# Patient Record
Sex: MALE | NOT HISPANIC OR LATINO | Employment: OTHER | URBAN - METROPOLITAN AREA
[De-identification: names, ages, dates, MRNs, and addresses within clinical notes are randomized per-mention and may not be internally consistent; named-entity substitution may affect disease eponyms.]

---

## 2017-01-10 ENCOUNTER — COMMUNICATION - HEALTHEAST (OUTPATIENT)
Dept: INTERNAL MEDICINE | Facility: CLINIC | Age: 67
End: 2017-01-10

## 2017-01-10 ENCOUNTER — HOSPITAL ENCOUNTER (OUTPATIENT)
Dept: CARDIOLOGY | Facility: HOSPITAL | Age: 67
Discharge: HOME | End: 2017-01-10
Attending: INTERNAL MEDICINE

## 2017-01-10 DIAGNOSIS — I34.1 MITRAL VALVE PROLAPSE: ICD-10-CM

## 2017-01-10 DIAGNOSIS — K21.00 REFLUX ESOPHAGITIS: ICD-10-CM

## 2017-01-10 LAB
AORTIC ROOT: 2.9 CM
AORTIC VALVE MEAN VELOCITY: 89.5 CM/S
ASCENDING AORTA: 2.9 CM
AV DIMENSIONLESS INDEX VTI: 0.7
AV MEAN GRADIENT: 4 MMHG
AV PEAK GRADIENT: 6.7 MMHG
AV VALVE AREA: 2.1 CM2
AV VELOCITY RATIO: 0.7
BSA FOR ECHO PROCEDURE: 2.49 M2
CV BLOOD PRESSURE: NORMAL MMHG
CV ECHO HEIGHT: 73 IN
CV ECHO WEIGHT: 265 LBS
DOP CALC AO PEAK VEL: 129 CM/S
DOP CALC AO VTI: 27 CM
DOP CALC LVOT AREA: 2.83 CM2
DOP CALC LVOT DIAMETER: 1.9 CM
DOP CALC LVOT PEAK VEL: 94 CM/S
DOP CALC LVOT STROKE VOLUME: 55.8 CM3
DOP CALC MV VTI: 25.5 CM
DOP CALCLVOT PEAK VEL VTI: 19.7 CM
ECHO EJECTION FRACTION ESTIMATED: 75 %
EJECTION FRACTION: 83 % (ref 55–75)
FRACTIONAL SHORTENING: 52.3 % (ref 28–44)
INTERVENTRICULAR SEPTUM IN END DIASTOLE: 0.8 CM (ref 0.6–1)
IVS/PW RATIO: 1
LA AREA 1: 17.7 CM2
LA AREA 2: 17.7 CM2
LEFT ATRIUM LENGTH: 4.82 CM
LEFT ATRIUM SIZE: 3.2 CM
LEFT ATRIUM VOLUME INDEX: 22.2 ML/M2
LEFT ATRIUM VOLUME: 55.2 CM3
LEFT VENTRICLE CARDIAC INDEX: 1.7 L/MIN/M2
LEFT VENTRICLE CARDIAC OUTPUT: 4.3 L/MIN
LEFT VENTRICLE DIASTOLIC VOLUME INDEX: 23.7 CM3/M2 (ref 34–74)
LEFT VENTRICLE DIASTOLIC VOLUME: 59 CM3 (ref 62–150)
LEFT VENTRICLE HEART RATE: 77 BPM
LEFT VENTRICLE MASS INDEX: 44 G/M2
LEFT VENTRICLE SYSTOLIC VOLUME INDEX: 4 CM3/M2 (ref 11–31)
LEFT VENTRICLE SYSTOLIC VOLUME: 10 CM3 (ref 21–61)
LEFT VENTRICULAR INTERNAL DIMENSION IN DIASTOLE: 4.4 CM (ref 4.2–5.8)
LEFT VENTRICULAR INTERNAL DIMENSION IN SYSTOLE: 2.1 CM (ref 2.5–4)
LEFT VENTRICULAR MASS: 109.4 G
LEFT VENTRICULAR OUTFLOW TRACT MEAN GRADIENT: 2 MMHG
LEFT VENTRICULAR OUTFLOW TRACT MEAN VELOCITY: 61 CM/S
LEFT VENTRICULAR OUTFLOW TRACT PEAK GRADIENT: 4 MMHG
LEFT VENTRICULAR POSTERIOR WALL IN END DIASTOLE: 0.8 CM (ref 0.6–1)
LV STROKE VOLUME INDEX: 22.4 ML/M2
MITRAL VALVE DECELERATION SLOPE: 3650 MM/S2
MITRAL VALVE E/A RATIO: 0.9
MITRAL VALVE MEAN INFLOW VELOCITY: 63 CM/S
MITRAL VALVE PEAK VELOCITY: 103 CM/S
MITRAL VALVE PRESSURE HALF-TIME: 73 MS
MV AREA VTI: 2.19 CM2
MV AVERAGE E/E' RATIO: 13.4 CM/S
MV DECELERATION TIME: 292 MS
MV E'TISSUE VEL-LAT: 6.34 CM/S
MV E'TISSUE VEL-MED: 5.46 CM/S
MV LATERAL E/E' RATIO: 12.5
MV MEAN GRADIENT: 2 MMHG
MV MEDIAL E/E' RATIO: 14.5
MV PEAK A VELOCITY: 86.9 CM/S
MV PEAK E VELOCITY: 79 CM/S
MV PEAK GRADIENT: 4.2 MMHG
MV VALVE AREA BY CONTINUITY EQUATION: 2.2 CM2
MV VALVE AREA PRESSURE 1/2 METHOD: 3 CM2
NUC REST DIASTOLIC VOLUME INDEX: 4240 LBS
NUC REST SYSTOLIC VOLUME INDEX: 73 IN
PULM VEIN A" WAVE DURATION": 127 MS
PULM VEIN S/D RATIO: 1.41
PULMONARY VEIN PEAK D VELOCITY: 48.2 CM/S
PULMONARY VEIN PEAK S VELOCITY: 67.9 CM/S
RA DIMENSION: 3 CM
RIGHT VENTRICULAR INTERNAL DIMENSION IN DYSTOLE: 3 CM
TRICUSPID REGURGITATION PEAK PRESSURE GRADIENT: 4.7 MMHG
TRICUSPID VALVE ANULAR PLANE SYSTOLIC EXCURSION: 2.7 CM
TRICUSPID VALVE PEAK REGURGITANT VELOCITY: 108 CM/S

## 2017-01-10 ASSESSMENT — MIFFLIN-ST. JEOR: SCORE: 2010.91

## 2017-01-16 ENCOUNTER — RECORDS - HEALTHEAST (OUTPATIENT)
Dept: PULMONOLOGY | Facility: OTHER | Age: 67
End: 2017-01-16

## 2017-01-16 ENCOUNTER — OFFICE VISIT - HEALTHEAST (OUTPATIENT)
Dept: PULMONOLOGY | Facility: OTHER | Age: 67
End: 2017-01-16

## 2017-01-16 ENCOUNTER — RECORDS - HEALTHEAST (OUTPATIENT)
Dept: ADMINISTRATIVE | Facility: OTHER | Age: 67
End: 2017-01-16

## 2017-01-16 ENCOUNTER — OFFICE VISIT - HEALTHEAST (OUTPATIENT)
Dept: CARDIOLOGY | Facility: CLINIC | Age: 67
End: 2017-01-16

## 2017-01-16 DIAGNOSIS — I10 ESSENTIAL HYPERTENSION: ICD-10-CM

## 2017-01-16 DIAGNOSIS — K21.9 GERD (GASTROESOPHAGEAL REFLUX DISEASE): ICD-10-CM

## 2017-01-16 DIAGNOSIS — G47.33 OSA ON CPAP: ICD-10-CM

## 2017-01-16 DIAGNOSIS — I25.10 CORONARY ARTERY DISEASE INVOLVING NATIVE CORONARY ARTERY OF NATIVE HEART WITHOUT ANGINA PECTORIS: ICD-10-CM

## 2017-01-16 DIAGNOSIS — R06.02 SHORTNESS OF BREATH: ICD-10-CM

## 2017-01-16 DIAGNOSIS — R06.00 DYSPNEA: ICD-10-CM

## 2017-01-16 DIAGNOSIS — J45.909 ASTHMA: ICD-10-CM

## 2017-01-16 LAB
CREAT SERPL-MCNC: 0.87 MG/DL (ref 0.7–1.3)
GFR SERPL CREATININE-BSD FRML MDRD: >60 ML/MIN/1.73M2

## 2017-01-16 ASSESSMENT — MIFFLIN-ST. JEOR
SCORE: 2015.45
SCORE: 2010.91

## 2017-01-26 ENCOUNTER — HOSPITAL ENCOUNTER (OUTPATIENT)
Dept: CT IMAGING | Facility: HOSPITAL | Age: 67
Discharge: HOME | End: 2017-01-26
Attending: INTERNAL MEDICINE

## 2017-01-26 DIAGNOSIS — R06.00 DYSPNEA: ICD-10-CM

## 2017-01-30 ENCOUNTER — COMMUNICATION - HEALTHEAST (OUTPATIENT)
Dept: PULMONOLOGY | Facility: OTHER | Age: 67
End: 2017-01-30

## 2017-02-01 ENCOUNTER — COMMUNICATION - HEALTHEAST (OUTPATIENT)
Dept: PULMONOLOGY | Facility: OTHER | Age: 67
End: 2017-02-01

## 2017-02-01 ENCOUNTER — COMMUNICATION - HEALTHEAST (OUTPATIENT)
Dept: CARDIOLOGY | Facility: CLINIC | Age: 67
End: 2017-02-01

## 2017-02-03 ENCOUNTER — COMMUNICATION - HEALTHEAST (OUTPATIENT)
Dept: PULMONOLOGY | Facility: OTHER | Age: 67
End: 2017-02-03

## 2017-02-09 ENCOUNTER — COMMUNICATION - HEALTHEAST (OUTPATIENT)
Dept: MULTI SPECIALTY CLINIC | Facility: CLINIC | Age: 67
End: 2017-02-09

## 2017-02-20 ENCOUNTER — RECORDS - HEALTHEAST (OUTPATIENT)
Dept: ADMINISTRATIVE | Facility: OTHER | Age: 67
End: 2017-02-20

## 2017-02-21 ENCOUNTER — COMMUNICATION - HEALTHEAST (OUTPATIENT)
Dept: INTERNAL MEDICINE | Facility: CLINIC | Age: 67
End: 2017-02-21

## 2017-02-22 ENCOUNTER — COMMUNICATION - HEALTHEAST (OUTPATIENT)
Dept: INTERNAL MEDICINE | Facility: CLINIC | Age: 67
End: 2017-02-22

## 2017-02-22 DIAGNOSIS — M54.40 BILATERAL LOW BACK PAIN WITH SCIATICA, SCIATICA LATERALITY UNSPECIFIED, UNSPECIFIED CHRONICITY: ICD-10-CM

## 2017-03-03 ENCOUNTER — COMMUNICATION - HEALTHEAST (OUTPATIENT)
Dept: INTERNAL MEDICINE | Facility: CLINIC | Age: 67
End: 2017-03-03

## 2017-03-06 ENCOUNTER — COMMUNICATION - HEALTHEAST (OUTPATIENT)
Dept: INTERNAL MEDICINE | Facility: CLINIC | Age: 67
End: 2017-03-06

## 2017-03-06 DIAGNOSIS — E78.5 HYPERLIPIDEMIA: ICD-10-CM

## 2017-03-06 DIAGNOSIS — Z79.899 MEDICATION MANAGEMENT: ICD-10-CM

## 2017-03-09 ENCOUNTER — COMMUNICATION - HEALTHEAST (OUTPATIENT)
Dept: INTERNAL MEDICINE | Facility: CLINIC | Age: 67
End: 2017-03-09

## 2017-03-27 ENCOUNTER — COMMUNICATION - HEALTHEAST (OUTPATIENT)
Dept: CARDIOLOGY | Facility: CLINIC | Age: 67
End: 2017-03-27

## 2017-03-27 DIAGNOSIS — R00.2 PALPITATIONS: ICD-10-CM

## 2017-03-29 ENCOUNTER — HOSPITAL ENCOUNTER (OUTPATIENT)
Dept: CARDIOLOGY | Facility: HOSPITAL | Age: 67
Discharge: HOME | End: 2017-03-29
Attending: INTERNAL MEDICINE

## 2017-03-29 DIAGNOSIS — R00.2 PALPITATIONS: ICD-10-CM

## 2017-04-02 ENCOUNTER — COMMUNICATION - HEALTHEAST (OUTPATIENT)
Dept: INTERNAL MEDICINE | Facility: CLINIC | Age: 67
End: 2017-04-02

## 2017-04-03 ENCOUNTER — COMMUNICATION - HEALTHEAST (OUTPATIENT)
Dept: INTERNAL MEDICINE | Facility: CLINIC | Age: 67
End: 2017-04-03

## 2017-04-06 ENCOUNTER — OFFICE VISIT - HEALTHEAST (OUTPATIENT)
Dept: INTERNAL MEDICINE | Facility: CLINIC | Age: 67
End: 2017-04-06

## 2017-04-06 DIAGNOSIS — E78.2 MIXED HYPERLIPIDEMIA: ICD-10-CM

## 2017-04-06 DIAGNOSIS — G47.33 OBSTRUCTIVE SLEEP APNEA (ADULT) (PEDIATRIC): ICD-10-CM

## 2017-04-06 DIAGNOSIS — R00.0 TACHYCARDIA: ICD-10-CM

## 2017-04-06 DIAGNOSIS — G89.29 BACK PAIN, CHRONIC: ICD-10-CM

## 2017-04-06 DIAGNOSIS — G89.21 CHRONIC PAIN DUE TO TRAUMA: ICD-10-CM

## 2017-04-06 DIAGNOSIS — R53.83 FATIGUE: ICD-10-CM

## 2017-04-06 DIAGNOSIS — I77.9 CAROTID ARTERY DISEASE (H): ICD-10-CM

## 2017-04-06 DIAGNOSIS — I10 ESSENTIAL HYPERTENSION: ICD-10-CM

## 2017-04-06 DIAGNOSIS — E11.9 TYPE 2 DIABETES MELLITUS WITHOUT COMPLICATION (H): ICD-10-CM

## 2017-04-06 DIAGNOSIS — M54.9 BACK PAIN, CHRONIC: ICD-10-CM

## 2017-04-06 DIAGNOSIS — L30.9 DERMATITIS: ICD-10-CM

## 2017-04-06 ASSESSMENT — MIFFLIN-ST. JEOR: SCORE: 2001.84

## 2017-04-07 ENCOUNTER — HOSPITAL ENCOUNTER (OUTPATIENT)
Dept: ULTRASOUND IMAGING | Facility: HOSPITAL | Age: 67
Discharge: HOME | End: 2017-04-07
Attending: INTERNAL MEDICINE

## 2017-04-07 DIAGNOSIS — I65.23 BILATERAL CAROTID ARTERY STENOSIS: ICD-10-CM

## 2017-04-07 DIAGNOSIS — I77.9 CAROTID ARTERY DISEASE (H): ICD-10-CM

## 2017-04-07 LAB
ALBUMIN PERCENT: 65.3 % (ref 51–67)
ALBUMIN SERPL ELPH-MCNC: 4.7 G/DL (ref 3.2–4.7)
ALPHA 1 PERCENT: 2.5 % (ref 2–4)
ALPHA 2 PERCENT: 11.4 % (ref 5–13)
ALPHA1 GLOB SERPL ELPH-MCNC: 0.2 G/DL (ref 0.1–0.3)
ALPHA2 GLOB SERPL ELPH-MCNC: 0.8 G/DL (ref 0.4–0.9)
B-GLOBULIN SERPL ELPH-MCNC: 0.8 G/DL (ref 0.7–1.2)
BETA PERCENT: 11.1 % (ref 10–17)
GAMMA GLOB SERPL ELPH-MCNC: 0.7 G/DL (ref 0.6–1.4)
GAMMA GLOBULIN PERCENT: 9.7 % (ref 9–20)
PATH ICD:: NORMAL
PROT PATTERN SERPL ELPH-IMP: NORMAL
PROT SERPL-MCNC: 7.2 G/DL (ref 6–8)
REVIEWING PATHOLOGIST: NORMAL

## 2017-04-11 ENCOUNTER — COMMUNICATION - HEALTHEAST (OUTPATIENT)
Dept: CARDIOLOGY | Facility: CLINIC | Age: 67
End: 2017-04-11

## 2017-04-11 ENCOUNTER — COMMUNICATION - HEALTHEAST (OUTPATIENT)
Dept: INTERNAL MEDICINE | Facility: CLINIC | Age: 67
End: 2017-04-11

## 2017-04-13 LAB — MUSK AB SER-SCNC: 0 NMOL/L (ref 0–0.02)

## 2017-04-14 ENCOUNTER — COMMUNICATION - HEALTHEAST (OUTPATIENT)
Dept: CARDIOLOGY | Facility: CLINIC | Age: 67
End: 2017-04-14

## 2017-04-14 ENCOUNTER — RECORDS - HEALTHEAST (OUTPATIENT)
Dept: ADMINISTRATIVE | Facility: OTHER | Age: 67
End: 2017-04-14

## 2017-04-14 DIAGNOSIS — R07.9 CHEST PAIN ON EXERTION: ICD-10-CM

## 2017-04-14 DIAGNOSIS — R06.09 DOE (DYSPNEA ON EXERTION): ICD-10-CM

## 2017-04-14 LAB
ACHR BIND IGG+IGM SER IA-SCNC: 0 NMOL/L
ACHR MOD AB/ACHR TOTAL SFR SER: 6 %
IMMUNOLOGIST REVIEW: NORMAL
STRIA MUS AB TITR SER: NEGATIVE TITER

## 2017-04-20 ENCOUNTER — COMMUNICATION - HEALTHEAST (OUTPATIENT)
Dept: CARDIOLOGY | Facility: CLINIC | Age: 67
End: 2017-04-20

## 2017-04-25 ENCOUNTER — HOSPITAL ENCOUNTER (OUTPATIENT)
Dept: CT IMAGING | Facility: CLINIC | Age: 67
Discharge: HOME | End: 2017-04-25
Attending: INTERNAL MEDICINE

## 2017-04-25 DIAGNOSIS — R06.09 DOE (DYSPNEA ON EXERTION): ICD-10-CM

## 2017-04-25 DIAGNOSIS — R07.9 CHEST PAIN ON EXERTION: ICD-10-CM

## 2017-04-25 DIAGNOSIS — R06.09 OTHER FORMS OF DYSPNEA: ICD-10-CM

## 2017-04-25 LAB
BSA FOR ECHO PROCEDURE: 2.48 M2
CV CALCIUM SCORE AGATSTON LM: 0
CV CALCIUM SCORING AGATSON LAD: 2
CV CALCIUM SCORING AGATSTON CX: 0
CV CALCIUM SCORING AGATSTON RCA: 67
CV CALCIUM SCORING AGATSTON TOTAL: 69
LEFT VENTRICLE HEART RATE: 61 BPM

## 2017-04-25 ASSESSMENT — MIFFLIN-ST. JEOR: SCORE: 2001.84

## 2017-04-28 ENCOUNTER — COMMUNICATION - HEALTHEAST (OUTPATIENT)
Dept: INTERNAL MEDICINE | Facility: CLINIC | Age: 67
End: 2017-04-28

## 2017-05-08 ENCOUNTER — COMMUNICATION - HEALTHEAST (OUTPATIENT)
Dept: PULMONOLOGY | Facility: OTHER | Age: 67
End: 2017-05-08

## 2017-05-08 ENCOUNTER — AMBULATORY - HEALTHEAST (OUTPATIENT)
Dept: PULMONOLOGY | Facility: OTHER | Age: 67
End: 2017-05-08

## 2017-05-08 DIAGNOSIS — R06.00 DYSPNEA: ICD-10-CM

## 2017-05-10 ENCOUNTER — AMBULATORY - HEALTHEAST (OUTPATIENT)
Dept: PULMONOLOGY | Facility: OTHER | Age: 67
End: 2017-05-10

## 2017-05-10 ENCOUNTER — COMMUNICATION - HEALTHEAST (OUTPATIENT)
Dept: PULMONOLOGY | Facility: OTHER | Age: 67
End: 2017-05-10

## 2017-05-10 DIAGNOSIS — R06.00 DYSPNEA: ICD-10-CM

## 2017-06-01 ENCOUNTER — COMMUNICATION - HEALTHEAST (OUTPATIENT)
Dept: PULMONOLOGY | Facility: OTHER | Age: 67
End: 2017-06-01

## 2017-06-02 ENCOUNTER — COMMUNICATION - HEALTHEAST (OUTPATIENT)
Dept: PULMONOLOGY | Facility: OTHER | Age: 67
End: 2017-06-02

## 2017-06-02 ENCOUNTER — AMBULATORY - HEALTHEAST (OUTPATIENT)
Dept: PULMONOLOGY | Facility: OTHER | Age: 67
End: 2017-06-02

## 2017-06-02 DIAGNOSIS — R06.00 DYSPNEA: ICD-10-CM

## 2017-06-06 ENCOUNTER — COMMUNICATION - HEALTHEAST (OUTPATIENT)
Dept: INTERNAL MEDICINE | Facility: CLINIC | Age: 67
End: 2017-06-06

## 2017-06-06 DIAGNOSIS — E78.5 HYPERLIPIDEMIA: ICD-10-CM

## 2017-06-20 ENCOUNTER — OFFICE VISIT - HEALTHEAST (OUTPATIENT)
Dept: INTERNAL MEDICINE | Facility: CLINIC | Age: 67
End: 2017-06-20

## 2017-06-20 ENCOUNTER — AMBULATORY - HEALTHEAST (OUTPATIENT)
Dept: INTERNAL MEDICINE | Facility: CLINIC | Age: 67
End: 2017-06-20

## 2017-06-20 DIAGNOSIS — E11.9 TYPE 2 DIABETES MELLITUS WITHOUT COMPLICATION (H): ICD-10-CM

## 2017-06-20 DIAGNOSIS — R06.02 SOBOE (SHORTNESS OF BREATH ON EXERTION): ICD-10-CM

## 2017-06-20 DIAGNOSIS — E66.9 OBESITY, UNSPECIFIED: ICD-10-CM

## 2017-06-20 DIAGNOSIS — K21.00 REFLUX ESOPHAGITIS: ICD-10-CM

## 2017-06-20 DIAGNOSIS — M54.50 LUMBAGO: ICD-10-CM

## 2017-06-20 DIAGNOSIS — G47.33 OBSTRUCTIVE SLEEP APNEA (ADULT) (PEDIATRIC): ICD-10-CM

## 2017-06-20 DIAGNOSIS — R00.0 TACHYCARDIA: ICD-10-CM

## 2017-06-20 LAB — HBA1C MFR BLD: 7.1 % (ref 3.5–6)

## 2017-06-20 ASSESSMENT — MIFFLIN-ST. JEOR: SCORE: 2002.75

## 2017-06-21 ENCOUNTER — COMMUNICATION - HEALTHEAST (OUTPATIENT)
Dept: INTERNAL MEDICINE | Facility: CLINIC | Age: 67
End: 2017-06-21

## 2017-06-29 ENCOUNTER — OFFICE VISIT - HEALTHEAST (OUTPATIENT)
Dept: PULMONOLOGY | Facility: OTHER | Age: 67
End: 2017-06-29

## 2017-06-29 DIAGNOSIS — J30.9 ALLERGIC RHINITIS, UNSPECIFIED ALLERGIC RHINITIS TRIGGER, UNSPECIFIED RHINITIS SEASONALITY: ICD-10-CM

## 2017-06-29 DIAGNOSIS — G47.33 OSA ON CPAP: ICD-10-CM

## 2017-06-29 DIAGNOSIS — J45.909 ASTHMA: ICD-10-CM

## 2017-06-29 DIAGNOSIS — R53.81 PHYSICAL DECONDITIONING: ICD-10-CM

## 2017-06-29 DIAGNOSIS — K21.9 GASTROESOPHAGEAL REFLUX DISEASE WITHOUT ESOPHAGITIS: ICD-10-CM

## 2017-07-05 ENCOUNTER — OFFICE VISIT - HEALTHEAST (OUTPATIENT)
Dept: SLEEP MEDICINE | Facility: CLINIC | Age: 67
End: 2017-07-05

## 2017-07-05 ENCOUNTER — COMMUNICATION - HEALTHEAST (OUTPATIENT)
Dept: SLEEP MEDICINE | Facility: CLINIC | Age: 67
End: 2017-07-05

## 2017-07-05 DIAGNOSIS — G47.33 OSA ON CPAP: ICD-10-CM

## 2017-07-05 DIAGNOSIS — G47.8 OTHER SLEEP DISORDERS: ICD-10-CM

## 2017-07-05 ASSESSMENT — MIFFLIN-ST. JEOR: SCORE: 2006.38

## 2017-07-18 ENCOUNTER — OFFICE VISIT - HEALTHEAST (OUTPATIENT)
Dept: PHYSICAL THERAPY | Facility: REHABILITATION | Age: 67
End: 2017-07-18

## 2017-07-18 DIAGNOSIS — M62.81 MUSCLE WEAKNESS (GENERALIZED): ICD-10-CM

## 2017-07-18 DIAGNOSIS — R53.1 DECREASED STRENGTH, ENDURANCE, AND MOBILITY: ICD-10-CM

## 2017-07-18 DIAGNOSIS — R26.81 GAIT INSTABILITY: ICD-10-CM

## 2017-07-18 DIAGNOSIS — Z74.09 DECREASED STRENGTH, ENDURANCE, AND MOBILITY: ICD-10-CM

## 2017-07-18 DIAGNOSIS — R68.89 DECREASED STRENGTH, ENDURANCE, AND MOBILITY: ICD-10-CM

## 2017-07-19 ENCOUNTER — OFFICE VISIT - HEALTHEAST (OUTPATIENT)
Dept: CARDIOLOGY | Facility: CLINIC | Age: 67
End: 2017-07-19

## 2017-07-19 DIAGNOSIS — I10 ESSENTIAL HYPERTENSION: ICD-10-CM

## 2017-07-19 ASSESSMENT — MIFFLIN-ST. JEOR: SCORE: 2006.38

## 2017-08-23 ENCOUNTER — RECORDS - HEALTHEAST (OUTPATIENT)
Dept: ADMINISTRATIVE | Facility: OTHER | Age: 67
End: 2017-08-23

## 2017-08-31 ENCOUNTER — COMMUNICATION - HEALTHEAST (OUTPATIENT)
Dept: PHYSICAL THERAPY | Facility: REHABILITATION | Age: 67
End: 2017-08-31

## 2017-09-10 ENCOUNTER — RECORDS - HEALTHEAST (OUTPATIENT)
Dept: SLEEP MEDICINE | Age: 67
End: 2017-09-10

## 2017-09-10 ENCOUNTER — RECORDS - HEALTHEAST (OUTPATIENT)
Dept: ADMINISTRATIVE | Facility: OTHER | Age: 67
End: 2017-09-10

## 2017-09-10 DIAGNOSIS — G47.33 OBSTRUCTIVE SLEEP APNEA (ADULT) (PEDIATRIC): ICD-10-CM

## 2017-09-10 DIAGNOSIS — G47.8 OTHER SLEEP DISORDERS: ICD-10-CM

## 2017-09-19 ENCOUNTER — AMBULATORY - HEALTHEAST (OUTPATIENT)
Dept: SLEEP MEDICINE | Facility: CLINIC | Age: 67
End: 2017-09-19

## 2017-09-20 ENCOUNTER — COMMUNICATION - HEALTHEAST (OUTPATIENT)
Dept: SLEEP MEDICINE | Facility: CLINIC | Age: 67
End: 2017-09-20

## 2017-09-21 ENCOUNTER — COMMUNICATION - HEALTHEAST (OUTPATIENT)
Dept: PULMONOLOGY | Facility: OTHER | Age: 67
End: 2017-09-21

## 2017-09-22 ENCOUNTER — AMBULATORY - HEALTHEAST (OUTPATIENT)
Dept: PULMONOLOGY | Facility: OTHER | Age: 67
End: 2017-09-22

## 2017-09-22 ENCOUNTER — OFFICE VISIT - HEALTHEAST (OUTPATIENT)
Dept: PHYSICAL THERAPY | Facility: REHABILITATION | Age: 67
End: 2017-09-22

## 2017-09-22 DIAGNOSIS — Z74.09 DECREASED STRENGTH, ENDURANCE, AND MOBILITY: ICD-10-CM

## 2017-09-22 DIAGNOSIS — R53.1 DECREASED STRENGTH, ENDURANCE, AND MOBILITY: ICD-10-CM

## 2017-09-22 DIAGNOSIS — M62.81 MUSCLE WEAKNESS (GENERALIZED): ICD-10-CM

## 2017-09-22 DIAGNOSIS — R68.89 DECREASED STRENGTH, ENDURANCE, AND MOBILITY: ICD-10-CM

## 2017-09-22 DIAGNOSIS — M54.50 CHRONIC BILATERAL LOW BACK PAIN WITHOUT SCIATICA: ICD-10-CM

## 2017-09-22 DIAGNOSIS — G89.29 CHRONIC BILATERAL LOW BACK PAIN WITHOUT SCIATICA: ICD-10-CM

## 2017-09-22 DIAGNOSIS — R06.00 DYSPNEA: ICD-10-CM

## 2017-09-26 ENCOUNTER — OFFICE VISIT - HEALTHEAST (OUTPATIENT)
Dept: PHYSICAL THERAPY | Facility: REHABILITATION | Age: 67
End: 2017-09-26

## 2017-09-26 DIAGNOSIS — Z74.09 DECREASED STRENGTH, ENDURANCE, AND MOBILITY: ICD-10-CM

## 2017-09-26 DIAGNOSIS — R68.89 DECREASED STRENGTH, ENDURANCE, AND MOBILITY: ICD-10-CM

## 2017-09-26 DIAGNOSIS — R53.1 DECREASED STRENGTH, ENDURANCE, AND MOBILITY: ICD-10-CM

## 2017-09-26 DIAGNOSIS — M62.81 MUSCLE WEAKNESS (GENERALIZED): ICD-10-CM

## 2017-09-26 DIAGNOSIS — G89.29 CHRONIC BILATERAL LOW BACK PAIN WITHOUT SCIATICA: ICD-10-CM

## 2017-09-26 DIAGNOSIS — M54.50 CHRONIC BILATERAL LOW BACK PAIN WITHOUT SCIATICA: ICD-10-CM

## 2017-09-28 ENCOUNTER — OFFICE VISIT - HEALTHEAST (OUTPATIENT)
Dept: SLEEP MEDICINE | Facility: CLINIC | Age: 67
End: 2017-09-28

## 2017-09-28 ENCOUNTER — AMBULATORY - HEALTHEAST (OUTPATIENT)
Dept: SLEEP MEDICINE | Facility: CLINIC | Age: 67
End: 2017-09-28

## 2017-09-28 DIAGNOSIS — G47.33 OSA ON CPAP: ICD-10-CM

## 2017-09-28 ASSESSMENT — MIFFLIN-ST. JEOR: SCORE: 2006.38

## 2017-10-18 ENCOUNTER — OFFICE VISIT - HEALTHEAST (OUTPATIENT)
Dept: PHYSICAL THERAPY | Facility: REHABILITATION | Age: 67
End: 2017-10-18

## 2017-10-18 DIAGNOSIS — R53.1 DECREASED STRENGTH, ENDURANCE, AND MOBILITY: ICD-10-CM

## 2017-10-18 DIAGNOSIS — R26.81 GAIT INSTABILITY: ICD-10-CM

## 2017-10-18 DIAGNOSIS — M54.50 CHRONIC BILATERAL LOW BACK PAIN WITHOUT SCIATICA: ICD-10-CM

## 2017-10-18 DIAGNOSIS — R68.89 DECREASED STRENGTH, ENDURANCE, AND MOBILITY: ICD-10-CM

## 2017-10-18 DIAGNOSIS — Z74.09 DECREASED STRENGTH, ENDURANCE, AND MOBILITY: ICD-10-CM

## 2017-10-18 DIAGNOSIS — G89.29 CHRONIC BILATERAL LOW BACK PAIN WITHOUT SCIATICA: ICD-10-CM

## 2017-10-18 DIAGNOSIS — M62.81 MUSCLE WEAKNESS (GENERALIZED): ICD-10-CM

## 2017-10-23 ENCOUNTER — RECORDS - HEALTHEAST (OUTPATIENT)
Dept: ADMINISTRATIVE | Facility: OTHER | Age: 67
End: 2017-10-23

## 2017-10-24 ENCOUNTER — RECORDS - HEALTHEAST (OUTPATIENT)
Dept: ADMINISTRATIVE | Facility: OTHER | Age: 67
End: 2017-10-24

## 2017-11-07 ENCOUNTER — COMMUNICATION - HEALTHEAST (OUTPATIENT)
Dept: PHYSICAL THERAPY | Facility: REHABILITATION | Age: 67
End: 2017-11-07

## 2017-11-08 ENCOUNTER — OFFICE VISIT - HEALTHEAST (OUTPATIENT)
Dept: PHYSICAL THERAPY | Facility: REHABILITATION | Age: 67
End: 2017-11-08

## 2017-11-08 DIAGNOSIS — Z74.09 DECREASED STRENGTH, ENDURANCE, AND MOBILITY: ICD-10-CM

## 2017-11-08 DIAGNOSIS — R68.89 DECREASED STRENGTH, ENDURANCE, AND MOBILITY: ICD-10-CM

## 2017-11-08 DIAGNOSIS — R26.81 GAIT INSTABILITY: ICD-10-CM

## 2017-11-08 DIAGNOSIS — R53.1 DECREASED STRENGTH, ENDURANCE, AND MOBILITY: ICD-10-CM

## 2017-11-08 DIAGNOSIS — M54.50 CHRONIC BILATERAL LOW BACK PAIN WITHOUT SCIATICA: ICD-10-CM

## 2017-11-08 DIAGNOSIS — M62.81 MUSCLE WEAKNESS (GENERALIZED): ICD-10-CM

## 2017-11-08 DIAGNOSIS — G89.29 CHRONIC BILATERAL LOW BACK PAIN WITHOUT SCIATICA: ICD-10-CM

## 2017-11-15 ENCOUNTER — COMMUNICATION - HEALTHEAST (OUTPATIENT)
Dept: PHYSICAL THERAPY | Facility: REHABILITATION | Age: 67
End: 2017-11-15

## 2017-11-22 ENCOUNTER — COMMUNICATION - HEALTHEAST (OUTPATIENT)
Dept: PHYSICAL THERAPY | Facility: REHABILITATION | Age: 67
End: 2017-11-22

## 2017-12-08 ENCOUNTER — COMMUNICATION - HEALTHEAST (OUTPATIENT)
Dept: INTERNAL MEDICINE | Facility: CLINIC | Age: 67
End: 2017-12-08

## 2017-12-23 ENCOUNTER — OFFICE VISIT - HEALTHEAST (OUTPATIENT)
Dept: FAMILY MEDICINE | Facility: CLINIC | Age: 67
End: 2017-12-23

## 2017-12-23 DIAGNOSIS — J20.9 BRONCHITIS, ACUTE: ICD-10-CM

## 2017-12-23 DIAGNOSIS — K57.92 DIVERTICULITIS OF INTESTINE WITHOUT PERFORATION OR ABSCESS, UNSPECIFIED BLEEDING STATUS, UNSPECIFIED PART OF INTESTINAL TRACT: ICD-10-CM

## 2017-12-23 DIAGNOSIS — J02.9 SORE THROAT: ICD-10-CM

## 2017-12-23 DIAGNOSIS — R06.03 ACUTE RESPIRATORY DISTRESS: ICD-10-CM

## 2017-12-23 ASSESSMENT — MIFFLIN-ST. JEOR: SCORE: 1951.94

## 2017-12-26 ENCOUNTER — COMMUNICATION - HEALTHEAST (OUTPATIENT)
Dept: INTERNAL MEDICINE | Facility: CLINIC | Age: 67
End: 2017-12-26

## 2017-12-26 DIAGNOSIS — K21.00 REFLUX ESOPHAGITIS: ICD-10-CM

## 2017-12-28 ENCOUNTER — RECORDS - HEALTHEAST (OUTPATIENT)
Dept: GENERAL RADIOLOGY | Facility: CLINIC | Age: 67
End: 2017-12-28

## 2017-12-28 ENCOUNTER — OFFICE VISIT - HEALTHEAST (OUTPATIENT)
Dept: INTERNAL MEDICINE | Facility: CLINIC | Age: 67
End: 2017-12-28

## 2017-12-28 DIAGNOSIS — E11.9 TYPE 2 DIABETES MELLITUS WITHOUT COMPLICATION (H): ICD-10-CM

## 2017-12-28 DIAGNOSIS — G47.33 OBSTRUCTIVE APNEA: ICD-10-CM

## 2017-12-28 DIAGNOSIS — J45.20 MILD INTERMITTENT ASTHMA WITHOUT COMPLICATION: ICD-10-CM

## 2017-12-28 DIAGNOSIS — J45.20 MILD INTERMITTENT ASTHMA, UNCOMPLICATED: ICD-10-CM

## 2017-12-28 DIAGNOSIS — J20.9 ACUTE BRONCHITIS: ICD-10-CM

## 2017-12-28 RX ORDER — SIMETHICONE 80 MG
80 TABLET,CHEWABLE ORAL EVERY 6 HOURS PRN
Status: SHIPPED | COMMUNITY
Start: 2016-05-27

## 2017-12-28 ASSESSMENT — MIFFLIN-ST. JEOR: SCORE: 1958.75

## 2018-01-08 ENCOUNTER — COMMUNICATION - HEALTHEAST (OUTPATIENT)
Dept: INTERNAL MEDICINE | Facility: CLINIC | Age: 68
End: 2018-01-08

## 2018-02-14 ENCOUNTER — COMMUNICATION - HEALTHEAST (OUTPATIENT)
Dept: INTERNAL MEDICINE | Facility: CLINIC | Age: 68
End: 2018-02-14

## 2018-02-14 DIAGNOSIS — K21.00 REFLUX ESOPHAGITIS: ICD-10-CM

## 2018-02-23 ENCOUNTER — COMMUNICATION - HEALTHEAST (OUTPATIENT)
Dept: INTERNAL MEDICINE | Facility: CLINIC | Age: 68
End: 2018-02-23

## 2018-02-23 DIAGNOSIS — E78.5 HYPERLIPIDEMIA: ICD-10-CM

## 2018-03-22 ENCOUNTER — COMMUNICATION - HEALTHEAST (OUTPATIENT)
Dept: INTERNAL MEDICINE | Facility: CLINIC | Age: 68
End: 2018-03-22

## 2018-03-22 DIAGNOSIS — E11.9 TYPE 2 DIABETES MELLITUS WITHOUT COMPLICATION (H): ICD-10-CM

## 2018-03-23 ENCOUNTER — COMMUNICATION - HEALTHEAST (OUTPATIENT)
Dept: INTERNAL MEDICINE | Facility: CLINIC | Age: 68
End: 2018-03-23

## 2018-03-23 DIAGNOSIS — Z79.899 MEDICATION MANAGEMENT: ICD-10-CM

## 2018-03-28 ENCOUNTER — COMMUNICATION - HEALTHEAST (OUTPATIENT)
Dept: INTERNAL MEDICINE | Facility: CLINIC | Age: 68
End: 2018-03-28

## 2018-03-28 ENCOUNTER — RECORDS - HEALTHEAST (OUTPATIENT)
Dept: ADMINISTRATIVE | Facility: OTHER | Age: 68
End: 2018-03-28

## 2018-03-28 ENCOUNTER — OFFICE VISIT - HEALTHEAST (OUTPATIENT)
Dept: INTERNAL MEDICINE | Facility: CLINIC | Age: 68
End: 2018-03-28

## 2018-03-28 DIAGNOSIS — Z00.00 ROUTINE GENERAL MEDICAL EXAMINATION AT A HEALTH CARE FACILITY: ICD-10-CM

## 2018-03-28 DIAGNOSIS — N39.41 URGENCY INCONTINENCE: ICD-10-CM

## 2018-03-28 DIAGNOSIS — I73.9 PERIPHERAL VASCULAR DISEASE (H): ICD-10-CM

## 2018-03-28 DIAGNOSIS — K21.00 REFLUX ESOPHAGITIS: ICD-10-CM

## 2018-03-28 DIAGNOSIS — Z12.5 SCREENING FOR PROSTATE CANCER: ICD-10-CM

## 2018-03-28 DIAGNOSIS — E11.9 TYPE 2 DIABETES MELLITUS WITHOUT COMPLICATION (H): ICD-10-CM

## 2018-03-28 DIAGNOSIS — H91.90 HEARING LOSS: ICD-10-CM

## 2018-03-28 DIAGNOSIS — Z00.00 MEDICARE ANNUAL WELLNESS VISIT, SUBSEQUENT: ICD-10-CM

## 2018-03-28 DIAGNOSIS — M47.812 CERVICAL SPONDYLOSIS: ICD-10-CM

## 2018-03-28 DIAGNOSIS — M54.9 BACK PAIN: ICD-10-CM

## 2018-03-28 DIAGNOSIS — E78.2 MIXED HYPERLIPIDEMIA: ICD-10-CM

## 2018-03-28 DIAGNOSIS — G47.33 OBSTRUCTIVE APNEA: ICD-10-CM

## 2018-03-28 LAB
ALBUMIN SERPL-MCNC: 4 G/DL (ref 3.5–5)
ALBUMIN UR-MCNC: NEGATIVE MG/DL
ALP SERPL-CCNC: 48 U/L (ref 45–120)
ALT SERPL W P-5'-P-CCNC: 23 U/L (ref 0–45)
ANION GAP SERPL CALCULATED.3IONS-SCNC: 10 MMOL/L (ref 5–18)
APPEARANCE UR: CLEAR
AST SERPL W P-5'-P-CCNC: 16 U/L (ref 0–40)
BILIRUB SERPL-MCNC: 0.7 MG/DL (ref 0–1)
BILIRUB UR QL STRIP: NEGATIVE
BUN SERPL-MCNC: 17 MG/DL (ref 8–22)
CALCIUM SERPL-MCNC: 9.9 MG/DL (ref 8.5–10.5)
CHLORIDE BLD-SCNC: 104 MMOL/L (ref 98–107)
CHOLEST SERPL-MCNC: 132 MG/DL
CO2 SERPL-SCNC: 21 MMOL/L (ref 22–31)
COLOR UR AUTO: YELLOW
CREAT SERPL-MCNC: 0.84 MG/DL (ref 0.7–1.3)
FASTING STATUS PATIENT QL REPORTED: YES
GFR SERPL CREATININE-BSD FRML MDRD: >60 ML/MIN/1.73M2
GLUCOSE BLD-MCNC: 115 MG/DL (ref 70–125)
GLUCOSE UR STRIP-MCNC: NEGATIVE MG/DL
HBA1C MFR BLD: 7 % (ref 3.5–6)
HDLC SERPL-MCNC: 34 MG/DL
HGB UR QL STRIP: NEGATIVE
KETONES UR STRIP-MCNC: NEGATIVE MG/DL
LDLC SERPL CALC-MCNC: 72 MG/DL
LEUKOCYTE ESTERASE UR QL STRIP: NEGATIVE
NITRATE UR QL: NEGATIVE
PH UR STRIP: 5 [PH] (ref 5–8)
POTASSIUM BLD-SCNC: 4.5 MMOL/L (ref 3.5–5)
PROT SERPL-MCNC: 7.2 G/DL (ref 6–8)
PSA SERPL-MCNC: 3.3 NG/ML (ref 0–4.5)
SODIUM SERPL-SCNC: 135 MMOL/L (ref 136–145)
SP GR UR STRIP: 1.02 (ref 1–1.03)
TRIGL SERPL-MCNC: 131 MG/DL
UROBILINOGEN UR STRIP-ACNC: NORMAL

## 2018-03-28 ASSESSMENT — MIFFLIN-ST. JEOR: SCORE: 1902.5

## 2018-03-29 ENCOUNTER — COMMUNICATION - HEALTHEAST (OUTPATIENT)
Dept: INTERNAL MEDICINE | Facility: CLINIC | Age: 68
End: 2018-03-29

## 2018-03-31 ENCOUNTER — RECORDS - HEALTHEAST (OUTPATIENT)
Dept: ADMINISTRATIVE | Facility: OTHER | Age: 68
End: 2018-03-31

## 2018-04-05 ENCOUNTER — COMMUNICATION - HEALTHEAST (OUTPATIENT)
Dept: INTERNAL MEDICINE | Facility: CLINIC | Age: 68
End: 2018-04-05

## 2018-04-10 ENCOUNTER — RECORDS - HEALTHEAST (OUTPATIENT)
Dept: ADMINISTRATIVE | Facility: OTHER | Age: 68
End: 2018-04-10

## 2018-04-10 ENCOUNTER — AMBULATORY - HEALTHEAST (OUTPATIENT)
Dept: INTERNAL MEDICINE | Facility: CLINIC | Age: 68
End: 2018-04-10

## 2018-04-10 ENCOUNTER — COMMUNICATION - HEALTHEAST (OUTPATIENT)
Dept: INTERNAL MEDICINE | Facility: CLINIC | Age: 68
End: 2018-04-10

## 2018-04-16 ENCOUNTER — RECORDS - HEALTHEAST (OUTPATIENT)
Dept: ADMINISTRATIVE | Facility: OTHER | Age: 68
End: 2018-04-16

## 2018-05-30 ENCOUNTER — COMMUNICATION - HEALTHEAST (OUTPATIENT)
Dept: ADMINISTRATIVE | Facility: CLINIC | Age: 68
End: 2018-05-30

## 2018-06-25 ENCOUNTER — COMMUNICATION - HEALTHEAST (OUTPATIENT)
Dept: INTERNAL MEDICINE | Facility: CLINIC | Age: 68
End: 2018-06-25

## 2018-06-25 DIAGNOSIS — K21.00 REFLUX ESOPHAGITIS: ICD-10-CM

## 2018-06-25 DIAGNOSIS — E11.9 TYPE 2 DIABETES MELLITUS WITHOUT COMPLICATION (H): ICD-10-CM

## 2018-07-23 ENCOUNTER — OFFICE VISIT - HEALTHEAST (OUTPATIENT)
Dept: INTERNAL MEDICINE | Facility: CLINIC | Age: 68
End: 2018-07-23

## 2018-07-23 DIAGNOSIS — S60.458A: ICD-10-CM

## 2018-07-23 DIAGNOSIS — M79.644 THUMB PAIN, RIGHT: ICD-10-CM

## 2018-07-25 ENCOUNTER — RECORDS - HEALTHEAST (OUTPATIENT)
Dept: ADMINISTRATIVE | Facility: OTHER | Age: 68
End: 2018-07-25

## 2018-08-04 ENCOUNTER — RECORDS - HEALTHEAST (OUTPATIENT)
Dept: ADMINISTRATIVE | Facility: OTHER | Age: 68
End: 2018-08-04

## 2018-09-04 ENCOUNTER — COMMUNICATION - HEALTHEAST (OUTPATIENT)
Dept: INTERNAL MEDICINE | Facility: CLINIC | Age: 68
End: 2018-09-04

## 2018-09-05 ENCOUNTER — RECORDS - HEALTHEAST (OUTPATIENT)
Dept: ADMINISTRATIVE | Facility: OTHER | Age: 68
End: 2018-09-05

## 2018-09-13 ENCOUNTER — RECORDS - HEALTHEAST (OUTPATIENT)
Dept: ADMINISTRATIVE | Facility: OTHER | Age: 68
End: 2018-09-13

## 2018-09-17 ENCOUNTER — OFFICE VISIT - HEALTHEAST (OUTPATIENT)
Dept: CARDIOLOGY | Facility: CLINIC | Age: 68
End: 2018-09-17

## 2018-09-17 DIAGNOSIS — I25.10 CORONARY ARTERY DISEASE INVOLVING NATIVE CORONARY ARTERY OF NATIVE HEART WITHOUT ANGINA PECTORIS: ICD-10-CM

## 2018-09-17 ASSESSMENT — MIFFLIN-ST. JEOR: SCORE: 1899.21

## 2018-10-05 ENCOUNTER — RECORDS - HEALTHEAST (OUTPATIENT)
Dept: ADMINISTRATIVE | Facility: OTHER | Age: 68
End: 2018-10-05

## 2018-10-29 ENCOUNTER — RECORDS - HEALTHEAST (OUTPATIENT)
Dept: ADMINISTRATIVE | Facility: OTHER | Age: 68
End: 2018-10-29

## 2018-11-07 ENCOUNTER — RECORDS - HEALTHEAST (OUTPATIENT)
Dept: ADMINISTRATIVE | Facility: OTHER | Age: 68
End: 2018-11-07

## 2018-11-13 ENCOUNTER — COMMUNICATION - HEALTHEAST (OUTPATIENT)
Dept: SCHEDULING | Facility: CLINIC | Age: 68
End: 2018-11-13

## 2018-11-14 ENCOUNTER — OFFICE VISIT - HEALTHEAST (OUTPATIENT)
Dept: INTERNAL MEDICINE | Facility: CLINIC | Age: 68
End: 2018-11-14

## 2018-11-14 DIAGNOSIS — R68.89 EXERCISE INTOLERANCE: ICD-10-CM

## 2018-11-14 DIAGNOSIS — G47.33 OBSTRUCTIVE APNEA: ICD-10-CM

## 2018-11-14 DIAGNOSIS — Z23 FLU VACCINE NEED: ICD-10-CM

## 2018-11-14 DIAGNOSIS — E66.812 CLASS 2 OBESITY DUE TO EXCESS CALORIES WITH BODY MASS INDEX (BMI) OF 39.0 TO 39.9 IN ADULT, UNSPECIFIED WHETHER SERIOUS COMORBIDITY PRESENT: ICD-10-CM

## 2018-11-14 DIAGNOSIS — E11.9 TYPE 2 DIABETES MELLITUS WITHOUT COMPLICATION, WITHOUT LONG-TERM CURRENT USE OF INSULIN (H): ICD-10-CM

## 2018-11-14 DIAGNOSIS — E66.09 CLASS 2 OBESITY DUE TO EXCESS CALORIES WITH BODY MASS INDEX (BMI) OF 39.0 TO 39.9 IN ADULT, UNSPECIFIED WHETHER SERIOUS COMORBIDITY PRESENT: ICD-10-CM

## 2018-11-14 LAB
ALBUMIN SERPL-MCNC: 4.2 G/DL (ref 3.5–5)
ALP SERPL-CCNC: 51 U/L (ref 45–120)
ALT SERPL W P-5'-P-CCNC: 15 U/L (ref 0–45)
ANION GAP SERPL CALCULATED.3IONS-SCNC: 11 MMOL/L (ref 5–18)
AST SERPL W P-5'-P-CCNC: 14 U/L (ref 0–40)
BILIRUB SERPL-MCNC: 0.6 MG/DL (ref 0–1)
BUN SERPL-MCNC: 18 MG/DL (ref 8–22)
CALCIUM SERPL-MCNC: 10.5 MG/DL (ref 8.5–10.5)
CHLORIDE BLD-SCNC: 103 MMOL/L (ref 98–107)
CO2 SERPL-SCNC: 24 MMOL/L (ref 22–31)
CREAT SERPL-MCNC: 0.8 MG/DL (ref 0.7–1.3)
ERYTHROCYTE [DISTWIDTH] IN BLOOD BY AUTOMATED COUNT: 12.4 % (ref 11–14.5)
GFR SERPL CREATININE-BSD FRML MDRD: >60 ML/MIN/1.73M2
GLUCOSE BLD-MCNC: 110 MG/DL (ref 70–125)
HBA1C MFR BLD: 6.3 % (ref 3.5–6)
HCT VFR BLD AUTO: 40.9 % (ref 40–54)
HGB BLD-MCNC: 13.8 G/DL (ref 14–18)
MCH RBC QN AUTO: 29.5 PG (ref 27–34)
MCHC RBC AUTO-ENTMCNC: 33.7 G/DL (ref 32–36)
MCV RBC AUTO: 87 FL (ref 80–100)
PLATELET # BLD AUTO: 277 THOU/UL (ref 140–440)
PMV BLD AUTO: 7.8 FL (ref 7–10)
POTASSIUM BLD-SCNC: 4.9 MMOL/L (ref 3.5–5)
PROT SERPL-MCNC: 7.3 G/DL (ref 6–8)
RBC # BLD AUTO: 4.67 MILL/UL (ref 4.4–6.2)
SODIUM SERPL-SCNC: 138 MMOL/L (ref 136–145)
T4 FREE SERPL-MCNC: 1 NG/DL (ref 0.7–1.8)
TSH SERPL DL<=0.005 MIU/L-ACNC: 2.81 UIU/ML (ref 0.3–5)
WBC: 8.1 THOU/UL (ref 4–11)

## 2018-11-15 ENCOUNTER — COMMUNICATION - HEALTHEAST (OUTPATIENT)
Dept: INTERNAL MEDICINE | Facility: CLINIC | Age: 68
End: 2018-11-15

## 2018-11-15 LAB — 25(OH)D3 SERPL-MCNC: 17.4 NG/ML (ref 30–80)

## 2018-11-21 ENCOUNTER — COMMUNICATION - HEALTHEAST (OUTPATIENT)
Dept: INTERNAL MEDICINE | Facility: CLINIC | Age: 68
End: 2018-11-21

## 2018-11-23 ENCOUNTER — HOSPITAL ENCOUNTER (OUTPATIENT)
Dept: CARDIOLOGY | Facility: HOSPITAL | Age: 68
Discharge: HOME | End: 2018-11-23
Attending: INTERNAL MEDICINE

## 2018-11-23 DIAGNOSIS — R68.89 EXERCISE INTOLERANCE: ICD-10-CM

## 2018-11-23 LAB
AORTIC ROOT: 3 CM
BSA FOR ECHO PROCEDURE: 2.38 M2
CV BLOOD PRESSURE: NORMAL MMHG
CV ECHO HEIGHT: 72.3 IN
CV ECHO WEIGHT: 246 LBS
DOP CALC LVOT AREA: 3.14 CM2
DOP CALC LVOT DIAMETER: 2 CM
DOP CALC LVOT PEAK VEL: 97.5 CM/S
DOP CALC LVOT STROKE VOLUME: 59.7 CM3
DOP CALCLVOT PEAK VEL VTI: 19 CM
EJECTION FRACTION: 65 % (ref 55–75)
FRACTIONAL SHORTENING: 35.3 % (ref 28–44)
INTERVENTRICULAR SEPTUM IN END DIASTOLE: 0.99 CM (ref 0.6–1)
IVS/PW RATIO: 1.2
LA AREA 1: 19 CM2
LA AREA 2: 15.2 CM2
LEFT ATRIUM LENGTH: 6.2 CM
LEFT ATRIUM SIZE: 3.4 CM
LEFT ATRIUM TO AORTIC ROOT RATIO: 1.13 NO UNITS
LEFT ATRIUM VOLUME INDEX: 16.6 ML/M2
LEFT ATRIUM VOLUME: 39.6 ML
LEFT VENTRICLE CARDIAC INDEX: 2.1 L/MIN/M2
LEFT VENTRICLE CARDIAC OUTPUT: 5 L/MIN
LEFT VENTRICLE DIASTOLIC VOLUME INDEX: 40.6 CM3/M2 (ref 34–74)
LEFT VENTRICLE DIASTOLIC VOLUME: 96.7 CM3 (ref 62–150)
LEFT VENTRICLE HEART RATE: 83 BPM
LEFT VENTRICLE MASS INDEX: 57.6 G/M2
LEFT VENTRICLE SYSTOLIC VOLUME INDEX: 14.4 CM3/M2 (ref 11–31)
LEFT VENTRICLE SYSTOLIC VOLUME: 34.2 CM3 (ref 21–61)
LEFT VENTRICULAR INTERNAL DIMENSION IN DIASTOLE: 4.59 CM (ref 4.2–5.8)
LEFT VENTRICULAR INTERNAL DIMENSION IN SYSTOLE: 2.97 CM (ref 2.5–4)
LEFT VENTRICULAR MASS: 137.1 G
LEFT VENTRICULAR OUTFLOW TRACT MEAN GRADIENT: 2 MMHG
LEFT VENTRICULAR OUTFLOW TRACT MEAN VELOCITY: 63.6 CM/S
LEFT VENTRICULAR OUTFLOW TRACT PEAK GRADIENT: 4 MMHG
LEFT VENTRICULAR POSTERIOR WALL IN END DIASTOLE: 0.81 CM (ref 0.6–1)
LV STROKE VOLUME INDEX: 25.1 ML/M2
MITRAL VALVE E/A RATIO: 0.7
MV AVERAGE E/E' RATIO: 8.9 CM/S
MV DECELERATION TIME: 271 MS
MV E'TISSUE VEL-LAT: 8.22 CM/S
MV E'TISSUE VEL-MED: 6.19 CM/S
MV LATERAL E/E' RATIO: 7.8
MV MEDIAL E/E' RATIO: 10.3
MV PEAK A VELOCITY: 94.1 CM/S
MV PEAK E VELOCITY: 64 CM/S
NUC REST DIASTOLIC VOLUME INDEX: 3936 LBS
NUC REST SYSTOLIC VOLUME INDEX: 72.25 IN
TRICUSPID REGURGITATION PEAK PRESSURE GRADIENT: 26.4 MMHG
TRICUSPID VALVE ANULAR PLANE SYSTOLIC EXCURSION: 2.6 CM
TRICUSPID VALVE PEAK REGURGITANT VELOCITY: 257 CM/S

## 2018-11-23 ASSESSMENT — MIFFLIN-ST. JEOR: SCORE: 1912.82

## 2018-11-26 ENCOUNTER — COMMUNICATION - HEALTHEAST (OUTPATIENT)
Dept: INTERNAL MEDICINE | Facility: CLINIC | Age: 68
End: 2018-11-26

## 2018-12-04 ENCOUNTER — COMMUNICATION - HEALTHEAST (OUTPATIENT)
Dept: INTERNAL MEDICINE | Facility: CLINIC | Age: 68
End: 2018-12-04

## 2018-12-04 DIAGNOSIS — R06.09 DYSPNEA ON EXERTION: ICD-10-CM

## 2018-12-05 ENCOUNTER — COMMUNICATION - HEALTHEAST (OUTPATIENT)
Dept: INTERNAL MEDICINE | Facility: CLINIC | Age: 68
End: 2018-12-05

## 2018-12-12 ENCOUNTER — COMMUNICATION - HEALTHEAST (OUTPATIENT)
Dept: INTERNAL MEDICINE | Facility: CLINIC | Age: 68
End: 2018-12-12

## 2018-12-12 DIAGNOSIS — G62.9 NEUROPATHY: ICD-10-CM

## 2018-12-18 ENCOUNTER — TRANSFERRED RECORDS (OUTPATIENT)
Dept: HEALTH INFORMATION MANAGEMENT | Facility: CLINIC | Age: 68
End: 2018-12-18

## 2018-12-18 ENCOUNTER — COMMUNICATION - HEALTHEAST (OUTPATIENT)
Dept: INTERNAL MEDICINE | Facility: CLINIC | Age: 68
End: 2018-12-18

## 2018-12-18 ENCOUNTER — HOSPITAL ENCOUNTER (OUTPATIENT)
Dept: RESPIRATORY THERAPY | Facility: HOSPITAL | Age: 68
Discharge: HOME | End: 2018-12-18
Attending: INTERNAL MEDICINE

## 2018-12-18 DIAGNOSIS — R06.09 OTHER FORMS OF DYSPNEA: ICD-10-CM

## 2018-12-18 DIAGNOSIS — R06.09 DYSPNEA ON EXERTION: ICD-10-CM

## 2018-12-18 LAB — HGB BLD-MCNC: 13.6 G/DL (ref 14–18)

## 2018-12-27 ENCOUNTER — RECORDS - HEALTHEAST (OUTPATIENT)
Dept: ADMINISTRATIVE | Facility: OTHER | Age: 68
End: 2018-12-27

## 2019-01-07 ENCOUNTER — RECORDS - HEALTHEAST (OUTPATIENT)
Dept: ADMINISTRATIVE | Facility: OTHER | Age: 69
End: 2019-01-07

## 2019-01-21 ENCOUNTER — COMMUNICATION - HEALTHEAST (OUTPATIENT)
Dept: ADMINISTRATIVE | Facility: CLINIC | Age: 69
End: 2019-01-21

## 2019-02-06 ENCOUNTER — TRANSFERRED RECORDS (OUTPATIENT)
Dept: PHYSICAL THERAPY | Facility: CLINIC | Age: 69
End: 2019-02-06

## 2019-02-06 ENCOUNTER — OFFICE VISIT - HEALTHEAST (OUTPATIENT)
Dept: PULMONOLOGY | Facility: OTHER | Age: 69
End: 2019-02-06

## 2019-02-06 DIAGNOSIS — K21.9 GASTROESOPHAGEAL REFLUX DISEASE WITHOUT ESOPHAGITIS: ICD-10-CM

## 2019-02-06 DIAGNOSIS — R06.00 DYSPNEA, UNSPECIFIED TYPE: ICD-10-CM

## 2019-02-06 DIAGNOSIS — J45.30 MILD PERSISTENT ASTHMA WITHOUT COMPLICATION: ICD-10-CM

## 2019-02-06 DIAGNOSIS — R53.81 PHYSICAL DECONDITIONING: ICD-10-CM

## 2019-02-06 ASSESSMENT — MIFFLIN-ST. JEOR: SCORE: 1953.64

## 2019-02-08 ENCOUNTER — RECORDS - HEALTHEAST (OUTPATIENT)
Dept: ADMINISTRATIVE | Facility: OTHER | Age: 69
End: 2019-02-08

## 2019-02-22 ENCOUNTER — TRANSFERRED RECORDS (OUTPATIENT)
Dept: PHYSICAL THERAPY | Facility: CLINIC | Age: 69
End: 2019-02-22

## 2019-02-25 ENCOUNTER — COMMUNICATION - HEALTHEAST (OUTPATIENT)
Dept: PULMONOLOGY | Facility: OTHER | Age: 69
End: 2019-02-25

## 2019-03-02 ENCOUNTER — COMMUNICATION - HEALTHEAST (OUTPATIENT)
Dept: PULMONOLOGY | Facility: OTHER | Age: 69
End: 2019-03-02

## 2019-03-04 ENCOUNTER — AMBULATORY - HEALTHEAST (OUTPATIENT)
Dept: PULMONOLOGY | Facility: OTHER | Age: 69
End: 2019-03-04

## 2019-03-04 DIAGNOSIS — J45.30 MILD PERSISTENT ASTHMA WITHOUT COMPLICATION: ICD-10-CM

## 2019-03-04 DIAGNOSIS — R06.00 DYSPNEA, UNSPECIFIED TYPE: ICD-10-CM

## 2019-03-20 ENCOUNTER — COMMUNICATION - HEALTHEAST (OUTPATIENT)
Dept: CARDIOLOGY | Facility: CLINIC | Age: 69
End: 2019-03-20

## 2019-03-20 ENCOUNTER — COMMUNICATION - HEALTHEAST (OUTPATIENT)
Dept: INTERNAL MEDICINE | Facility: CLINIC | Age: 69
End: 2019-03-20

## 2019-03-20 DIAGNOSIS — F41.9 ANXIETY: ICD-10-CM

## 2019-03-26 ENCOUNTER — RECORDS - HEALTHEAST (OUTPATIENT)
Dept: HEALTH INFORMATION MANAGEMENT | Facility: CLINIC | Age: 69
End: 2019-03-26

## 2019-04-03 ENCOUNTER — COMMUNICATION - HEALTHEAST (OUTPATIENT)
Dept: INTERNAL MEDICINE | Facility: CLINIC | Age: 69
End: 2019-04-03

## 2019-04-03 ENCOUNTER — OFFICE VISIT - HEALTHEAST (OUTPATIENT)
Dept: INTERNAL MEDICINE | Facility: CLINIC | Age: 69
End: 2019-04-03

## 2019-04-03 DIAGNOSIS — E55.9 VITAMIN D DEFICIENCY: ICD-10-CM

## 2019-04-03 DIAGNOSIS — Z51.81 MEDICATION MONITORING ENCOUNTER: ICD-10-CM

## 2019-04-03 DIAGNOSIS — J45.20 MILD INTERMITTENT ASTHMA WITHOUT COMPLICATION: ICD-10-CM

## 2019-04-03 DIAGNOSIS — F41.0 PANIC ANXIETY SYNDROME: ICD-10-CM

## 2019-04-03 DIAGNOSIS — R07.9 ACUTE CHEST PAIN: ICD-10-CM

## 2019-04-03 LAB
AMPHETAMINES UR QL SCN: NORMAL
BARBITURATES UR QL: NORMAL
BENZODIAZ UR QL: NORMAL
CANNABINOIDS UR QL SCN: NORMAL
COCAINE UR QL: NORMAL
CREAT UR-MCNC: 209.1 MG/DL
OPIATES UR QL SCN: NORMAL
OXYCODONE UR QL: NORMAL
PCP UR QL SCN: NORMAL

## 2019-04-03 RX ORDER — NITROGLYCERIN 0.3 MG/1
0.3 TABLET SUBLINGUAL EVERY 5 MIN PRN
Qty: 1 BOTTLE | Refills: 5 | Status: SHIPPED | OUTPATIENT
Start: 2019-04-03 | End: 2021-12-07

## 2019-04-03 ASSESSMENT — MIFFLIN-ST. JEOR: SCORE: 1921.89

## 2019-04-08 ENCOUNTER — COMMUNICATION - HEALTHEAST (OUTPATIENT)
Dept: INTERNAL MEDICINE | Facility: CLINIC | Age: 69
End: 2019-04-08

## 2019-04-15 ENCOUNTER — HOSPITAL ENCOUNTER (OUTPATIENT)
Dept: NUCLEAR MEDICINE | Facility: HOSPITAL | Age: 69
Discharge: HOME | End: 2019-04-15
Attending: INTERNAL MEDICINE

## 2019-04-15 ENCOUNTER — COMMUNICATION - HEALTHEAST (OUTPATIENT)
Dept: INTERNAL MEDICINE | Facility: CLINIC | Age: 69
End: 2019-04-15

## 2019-04-15 ENCOUNTER — HOSPITAL ENCOUNTER (OUTPATIENT)
Dept: CARDIOLOGY | Facility: HOSPITAL | Age: 69
Discharge: HOME | End: 2019-04-15
Attending: INTERNAL MEDICINE

## 2019-04-15 ENCOUNTER — COMMUNICATION - HEALTHEAST (OUTPATIENT)
Dept: CARDIOLOGY | Facility: CLINIC | Age: 69
End: 2019-04-15

## 2019-04-15 DIAGNOSIS — R07.9 ACUTE CHEST PAIN: ICD-10-CM

## 2019-04-15 LAB
CV STRESS CURRENT BP HE: NORMAL
CV STRESS CURRENT HR HE: 101
CV STRESS CURRENT HR HE: 113
CV STRESS CURRENT HR HE: 115
CV STRESS CURRENT HR HE: 117
CV STRESS CURRENT HR HE: 118
CV STRESS CURRENT HR HE: 119
CV STRESS CURRENT HR HE: 121
CV STRESS CURRENT HR HE: 121
CV STRESS CURRENT HR HE: 123
CV STRESS CURRENT HR HE: 128
CV STRESS CURRENT HR HE: 130
CV STRESS CURRENT HR HE: 132
CV STRESS CURRENT HR HE: 132
CV STRESS CURRENT HR HE: 133
CV STRESS CURRENT HR HE: 77
CV STRESS CURRENT HR HE: 78
CV STRESS CURRENT HR HE: 94
CV STRESS CURRENT HR HE: 96
CV STRESS DEVIATION TIME HE: NORMAL
CV STRESS ECHO PERCENT HR HE: NORMAL
CV STRESS EXERCISE STAGE HE: NORMAL
CV STRESS FINAL RESTING BP HE: NORMAL
CV STRESS FINAL RESTING HR HE: 96
CV STRESS MAX HR HE: 133
CV STRESS MAX TREADMILL GRADE HE: 0
CV STRESS MAX TREADMILL SPEED HE: 0
CV STRESS PEAK DIA BP HE: NORMAL
CV STRESS PEAK SYS BP HE: NORMAL
CV STRESS PHASE HE: NORMAL
CV STRESS PROTOCOL HE: NORMAL
CV STRESS RESTING PT POSITION HE: NORMAL
CV STRESS ST DEVIATION AMOUNT HE: NORMAL
CV STRESS ST DEVIATION ELEVATION HE: NORMAL
CV STRESS ST EVELATION AMOUNT HE: NORMAL
CV STRESS TEST TYPE HE: NORMAL
CV STRESS TOTAL STAGE TIME MIN 1 HE: NORMAL
NUC STRESS EJECTION FRACTION: 68 %
STRESS ECHO BASELINE BP: NORMAL
STRESS ECHO BASELINE HR: 83
STRESS ECHO CALCULATED PERCENT HR: 88 %
STRESS ECHO LAST STRESS BP: NORMAL
STRESS ECHO LAST STRESS HR: 130

## 2019-04-16 ENCOUNTER — COMMUNICATION - HEALTHEAST (OUTPATIENT)
Dept: INTERNAL MEDICINE | Facility: CLINIC | Age: 69
End: 2019-04-16

## 2019-04-18 ENCOUNTER — SURGERY - HEALTHEAST (OUTPATIENT)
Dept: CARDIOLOGY | Facility: CLINIC | Age: 69
End: 2019-04-18

## 2019-04-18 ENCOUNTER — COMMUNICATION - HEALTHEAST (OUTPATIENT)
Dept: CARDIOLOGY | Facility: CLINIC | Age: 69
End: 2019-04-18

## 2019-04-18 ENCOUNTER — OFFICE VISIT - HEALTHEAST (OUTPATIENT)
Dept: CARDIOLOGY | Facility: CLINIC | Age: 69
End: 2019-04-18

## 2019-04-18 DIAGNOSIS — R07.9 CHEST PAIN: ICD-10-CM

## 2019-04-18 DIAGNOSIS — R94.39 ABNORMAL STRESS TEST: ICD-10-CM

## 2019-04-18 ASSESSMENT — MIFFLIN-ST. JEOR
SCORE: 1934.25
SCORE: 1920.19

## 2019-04-19 ENCOUNTER — SURGERY - HEALTHEAST (OUTPATIENT)
Dept: CARDIOLOGY | Facility: CLINIC | Age: 69
End: 2019-04-19

## 2019-04-19 ASSESSMENT — MIFFLIN-ST. JEOR: SCORE: 1925.64

## 2019-04-20 ASSESSMENT — MIFFLIN-ST. JEOR: SCORE: 1927.9

## 2019-04-23 ENCOUNTER — AMBULATORY - HEALTHEAST (OUTPATIENT)
Dept: CARDIOLOGY | Facility: CLINIC | Age: 69
End: 2019-04-23

## 2019-04-23 ENCOUNTER — AMBULATORY - HEALTHEAST (OUTPATIENT)
Dept: CARDIAC REHAB | Facility: CLINIC | Age: 69
End: 2019-04-23

## 2019-04-23 DIAGNOSIS — I25.10 CORONARY ARTERY DISEASE INVOLVING NATIVE CORONARY ARTERY OF NATIVE HEART WITHOUT ANGINA PECTORIS: ICD-10-CM

## 2019-04-25 ENCOUNTER — AMBULATORY - HEALTHEAST (OUTPATIENT)
Dept: CARDIAC REHAB | Facility: HOSPITAL | Age: 69
End: 2019-04-25

## 2019-04-25 DIAGNOSIS — Z95.5 STENTED CORONARY ARTERY: ICD-10-CM

## 2019-04-29 ENCOUNTER — OFFICE VISIT - HEALTHEAST (OUTPATIENT)
Dept: INTERNAL MEDICINE | Facility: CLINIC | Age: 69
End: 2019-04-29

## 2019-04-29 DIAGNOSIS — E11.9 TYPE 2 DIABETES MELLITUS WITHOUT COMPLICATION, WITHOUT LONG-TERM CURRENT USE OF INSULIN (H): ICD-10-CM

## 2019-04-29 DIAGNOSIS — E11.9 TYPE 2 DIABETES MELLITUS WITHOUT COMPLICATION (H): ICD-10-CM

## 2019-04-29 DIAGNOSIS — K21.00 REFLUX ESOPHAGITIS: ICD-10-CM

## 2019-04-29 DIAGNOSIS — Z51.81 ENCOUNTER FOR MEDICATION MONITORING: ICD-10-CM

## 2019-04-29 DIAGNOSIS — I10 ESSENTIAL HYPERTENSION: ICD-10-CM

## 2019-04-29 ASSESSMENT — MIFFLIN-ST. JEOR: SCORE: 1911.12

## 2019-05-01 ENCOUNTER — AMBULATORY - HEALTHEAST (OUTPATIENT)
Dept: CARDIAC REHAB | Facility: HOSPITAL | Age: 69
End: 2019-05-01

## 2019-05-01 DIAGNOSIS — Z95.5 STENTED CORONARY ARTERY: ICD-10-CM

## 2019-05-02 ENCOUNTER — COMMUNICATION - HEALTHEAST (OUTPATIENT)
Dept: SCHEDULING | Facility: CLINIC | Age: 69
End: 2019-05-02

## 2019-05-03 ENCOUNTER — OFFICE VISIT - HEALTHEAST (OUTPATIENT)
Dept: CARDIOLOGY | Facility: CLINIC | Age: 69
End: 2019-05-03

## 2019-05-03 DIAGNOSIS — I10 ESSENTIAL HYPERTENSION: ICD-10-CM

## 2019-05-03 DIAGNOSIS — I25.83 CORONARY ARTERY DISEASE DUE TO LIPID RICH PLAQUE: ICD-10-CM

## 2019-05-03 DIAGNOSIS — G47.33 OSA (OBSTRUCTIVE SLEEP APNEA): ICD-10-CM

## 2019-05-03 DIAGNOSIS — E11.9 TYPE 2 DIABETES MELLITUS WITHOUT COMPLICATION (H): ICD-10-CM

## 2019-05-03 DIAGNOSIS — I25.10 CORONARY ARTERY DISEASE DUE TO LIPID RICH PLAQUE: ICD-10-CM

## 2019-05-03 DIAGNOSIS — E78.5 DYSLIPIDEMIA, GOAL LDL BELOW 70: ICD-10-CM

## 2019-05-03 ASSESSMENT — MIFFLIN-ST. JEOR: SCORE: 1902.05

## 2019-05-08 ENCOUNTER — AMBULATORY - HEALTHEAST (OUTPATIENT)
Dept: CARDIAC REHAB | Facility: HOSPITAL | Age: 69
End: 2019-05-08

## 2019-05-08 DIAGNOSIS — Z95.5 STENTED CORONARY ARTERY: ICD-10-CM

## 2019-05-10 ENCOUNTER — COMMUNICATION - HEALTHEAST (OUTPATIENT)
Dept: INTERNAL MEDICINE | Facility: CLINIC | Age: 69
End: 2019-05-10

## 2019-05-22 ENCOUNTER — COMMUNICATION - HEALTHEAST (OUTPATIENT)
Dept: CARDIOLOGY | Facility: CLINIC | Age: 69
End: 2019-05-22

## 2019-05-22 ENCOUNTER — AMBULATORY - HEALTHEAST (OUTPATIENT)
Dept: CARDIAC REHAB | Facility: HOSPITAL | Age: 69
End: 2019-05-22

## 2019-05-22 ENCOUNTER — COMMUNICATION - HEALTHEAST (OUTPATIENT)
Dept: INTENSIVE CARE | Facility: CLINIC | Age: 69
End: 2019-05-22

## 2019-05-22 ENCOUNTER — COMMUNICATION - HEALTHEAST (OUTPATIENT)
Dept: INTERNAL MEDICINE | Facility: CLINIC | Age: 69
End: 2019-05-22

## 2019-05-22 DIAGNOSIS — I25.83 CORONARY ARTERY DISEASE DUE TO LIPID RICH PLAQUE: ICD-10-CM

## 2019-05-22 DIAGNOSIS — I25.10 CORONARY ARTERY DISEASE DUE TO LIPID RICH PLAQUE: ICD-10-CM

## 2019-05-22 DIAGNOSIS — Z95.5 STENTED CORONARY ARTERY: ICD-10-CM

## 2019-05-28 ENCOUNTER — AMBULATORY - HEALTHEAST (OUTPATIENT)
Dept: CARDIOLOGY | Facility: CLINIC | Age: 69
End: 2019-05-28

## 2019-05-28 DIAGNOSIS — I25.10 CORONARY ARTERY DISEASE DUE TO LIPID RICH PLAQUE: ICD-10-CM

## 2019-05-28 DIAGNOSIS — I25.83 CORONARY ARTERY DISEASE DUE TO LIPID RICH PLAQUE: ICD-10-CM

## 2019-05-29 ENCOUNTER — AMBULATORY - HEALTHEAST (OUTPATIENT)
Dept: CARDIAC REHAB | Facility: HOSPITAL | Age: 69
End: 2019-05-29

## 2019-05-29 DIAGNOSIS — Z95.5 STENTED CORONARY ARTERY: ICD-10-CM

## 2019-06-05 ENCOUNTER — AMBULATORY - HEALTHEAST (OUTPATIENT)
Dept: CARDIAC REHAB | Facility: HOSPITAL | Age: 69
End: 2019-06-05

## 2019-06-05 DIAGNOSIS — Z95.5 STENTED CORONARY ARTERY: ICD-10-CM

## 2019-06-12 ENCOUNTER — AMBULATORY - HEALTHEAST (OUTPATIENT)
Dept: CARDIAC REHAB | Facility: HOSPITAL | Age: 69
End: 2019-06-12

## 2019-06-12 DIAGNOSIS — Z95.5 STENTED CORONARY ARTERY: ICD-10-CM

## 2019-06-14 ENCOUNTER — COMMUNICATION - HEALTHEAST (OUTPATIENT)
Dept: INTERNAL MEDICINE | Facility: CLINIC | Age: 69
End: 2019-06-14

## 2019-06-14 DIAGNOSIS — Z79.899 MEDICATION MANAGEMENT: ICD-10-CM

## 2019-06-17 ENCOUNTER — OFFICE VISIT - HEALTHEAST (OUTPATIENT)
Dept: CARDIOLOGY | Facility: CLINIC | Age: 69
End: 2019-06-17

## 2019-06-17 DIAGNOSIS — R09.89 CAROTID BRUIT: ICD-10-CM

## 2019-06-17 DIAGNOSIS — I25.10 CORONARY ARTERY DISEASE INVOLVING NATIVE CORONARY ARTERY OF NATIVE HEART WITHOUT ANGINA PECTORIS: ICD-10-CM

## 2019-06-17 DIAGNOSIS — E11.9 TYPE 2 DIABETES MELLITUS WITHOUT COMPLICATION (H): ICD-10-CM

## 2019-06-17 ASSESSMENT — MIFFLIN-ST. JEOR: SCORE: 1870.3

## 2019-06-20 ENCOUNTER — RECORDS - HEALTHEAST (OUTPATIENT)
Dept: ADMINISTRATIVE | Facility: OTHER | Age: 69
End: 2019-06-20

## 2019-06-27 ENCOUNTER — OFFICE VISIT - HEALTHEAST (OUTPATIENT)
Dept: INTERNAL MEDICINE | Facility: CLINIC | Age: 69
End: 2019-06-27

## 2019-06-27 DIAGNOSIS — Z79.899 CHRONIC PRESCRIPTION BENZODIAZEPINE USE: ICD-10-CM

## 2019-06-27 DIAGNOSIS — E11.9 TYPE 2 DIABETES MELLITUS WITHOUT COMPLICATION, WITHOUT LONG-TERM CURRENT USE OF INSULIN (H): ICD-10-CM

## 2019-06-27 DIAGNOSIS — Z12.5 SCREENING FOR PROSTATE CANCER: ICD-10-CM

## 2019-06-27 DIAGNOSIS — G47.33 OSA (OBSTRUCTIVE SLEEP APNEA): ICD-10-CM

## 2019-06-27 DIAGNOSIS — E78.5 HYPERLIPIDEMIA LDL GOAL <70: ICD-10-CM

## 2019-06-27 DIAGNOSIS — I25.10 CORONARY ARTERY DISEASE DUE TO LIPID RICH PLAQUE: ICD-10-CM

## 2019-06-27 DIAGNOSIS — I10 ESSENTIAL HYPERTENSION: ICD-10-CM

## 2019-06-27 DIAGNOSIS — F41.0 PANIC ANXIETY SYNDROME: ICD-10-CM

## 2019-06-27 DIAGNOSIS — I25.83 CORONARY ARTERY DISEASE DUE TO LIPID RICH PLAQUE: ICD-10-CM

## 2019-06-27 LAB
ALBUMIN SERPL-MCNC: 4.4 G/DL (ref 3.5–5)
ALP SERPL-CCNC: 44 U/L (ref 45–120)
ALT SERPL W P-5'-P-CCNC: 26 U/L (ref 0–45)
ANION GAP SERPL CALCULATED.3IONS-SCNC: 12 MMOL/L (ref 5–18)
AST SERPL W P-5'-P-CCNC: 17 U/L (ref 0–40)
BASOPHILS # BLD AUTO: 0 THOU/UL (ref 0–0.2)
BASOPHILS NFR BLD AUTO: 1 % (ref 0–2)
BILIRUB SERPL-MCNC: 0.8 MG/DL (ref 0–1)
BUN SERPL-MCNC: 19 MG/DL (ref 8–22)
CALCIUM SERPL-MCNC: 10.4 MG/DL (ref 8.5–10.5)
CHLORIDE BLD-SCNC: 105 MMOL/L (ref 98–107)
CHOLEST SERPL-MCNC: 137 MG/DL
CO2 SERPL-SCNC: 21 MMOL/L (ref 22–31)
CREAT SERPL-MCNC: 0.84 MG/DL (ref 0.7–1.3)
EOSINOPHIL # BLD AUTO: 0.2 THOU/UL (ref 0–0.4)
EOSINOPHIL NFR BLD AUTO: 3 % (ref 0–6)
ERYTHROCYTE [DISTWIDTH] IN BLOOD BY AUTOMATED COUNT: 13 % (ref 11–14.5)
FASTING STATUS PATIENT QL REPORTED: YES
GFR SERPL CREATININE-BSD FRML MDRD: >60 ML/MIN/1.73M2
GLUCOSE BLD-MCNC: 107 MG/DL (ref 70–125)
HBA1C MFR BLD: 6.4 % (ref 3.5–6)
HCT VFR BLD AUTO: 42.4 % (ref 40–54)
HDLC SERPL-MCNC: 38 MG/DL
HGB BLD-MCNC: 14.4 G/DL (ref 14–18)
LDLC SERPL CALC-MCNC: 73 MG/DL
LYMPHOCYTES # BLD AUTO: 1.5 THOU/UL (ref 0.8–4.4)
LYMPHOCYTES NFR BLD AUTO: 25 % (ref 20–40)
MCH RBC QN AUTO: 30.9 PG (ref 27–34)
MCHC RBC AUTO-ENTMCNC: 33.9 G/DL (ref 32–36)
MCV RBC AUTO: 91 FL (ref 80–100)
MONOCYTES # BLD AUTO: 0.4 THOU/UL (ref 0–0.9)
MONOCYTES NFR BLD AUTO: 6 % (ref 2–10)
NEUTROPHILS # BLD AUTO: 4 THOU/UL (ref 2–7.7)
NEUTROPHILS NFR BLD AUTO: 65 % (ref 50–70)
PLATELET # BLD AUTO: 273 THOU/UL (ref 140–440)
PMV BLD AUTO: 8.2 FL (ref 7–10)
POTASSIUM BLD-SCNC: 4.3 MMOL/L (ref 3.5–5)
PROT SERPL-MCNC: 7.2 G/DL (ref 6–8)
PSA SERPL-MCNC: 1.5 NG/ML (ref 0–4.5)
RBC # BLD AUTO: 4.64 MILL/UL (ref 4.4–6.2)
SODIUM SERPL-SCNC: 138 MMOL/L (ref 136–145)
TRIGL SERPL-MCNC: 132 MG/DL
WBC: 6.2 THOU/UL (ref 4–11)

## 2019-06-27 ASSESSMENT — MIFFLIN-ST. JEOR: SCORE: 1840.81

## 2019-07-01 ENCOUNTER — COMMUNICATION - HEALTHEAST (OUTPATIENT)
Dept: CARDIOLOGY | Facility: CLINIC | Age: 69
End: 2019-07-01

## 2019-07-02 ENCOUNTER — COMMUNICATION - HEALTHEAST (OUTPATIENT)
Dept: INTERNAL MEDICINE | Facility: CLINIC | Age: 69
End: 2019-07-02

## 2019-07-10 ENCOUNTER — HOSPITAL ENCOUNTER (OUTPATIENT)
Dept: ULTRASOUND IMAGING | Facility: HOSPITAL | Age: 69
Discharge: HOME | End: 2019-07-10
Attending: INTERNAL MEDICINE

## 2019-07-16 ENCOUNTER — OFFICE VISIT - HEALTHEAST (OUTPATIENT)
Dept: SLEEP MEDICINE | Facility: CLINIC | Age: 69
End: 2019-07-16

## 2019-07-16 DIAGNOSIS — G47.33 OSA ON CPAP: ICD-10-CM

## 2019-07-16 DIAGNOSIS — G47.8 SLEEP DYSFUNCTION WITH SLEEP STAGE DISTURBANCE: ICD-10-CM

## 2019-07-16 ASSESSMENT — MIFFLIN-ST. JEOR: SCORE: 1862.36

## 2019-07-31 ENCOUNTER — COMMUNICATION - HEALTHEAST (OUTPATIENT)
Dept: INTERNAL MEDICINE | Facility: CLINIC | Age: 69
End: 2019-07-31

## 2019-07-31 DIAGNOSIS — E78.5 DYSLIPIDEMIA, GOAL LDL BELOW 70: ICD-10-CM

## 2019-08-01 ENCOUNTER — COMMUNICATION - HEALTHEAST (OUTPATIENT)
Dept: INTERNAL MEDICINE | Facility: CLINIC | Age: 69
End: 2019-08-01

## 2019-08-08 ENCOUNTER — COMMUNICATION - HEALTHEAST (OUTPATIENT)
Dept: CARDIOLOGY | Facility: CLINIC | Age: 69
End: 2019-08-08

## 2019-09-19 ENCOUNTER — OFFICE VISIT - HEALTHEAST (OUTPATIENT)
Dept: INTERNAL MEDICINE | Facility: CLINIC | Age: 69
End: 2019-09-19

## 2019-09-19 DIAGNOSIS — A41.51 SEPSIS DUE TO ESCHERICHIA COLI (H): ICD-10-CM

## 2019-09-19 DIAGNOSIS — I25.83 CORONARY ARTERY DISEASE DUE TO LIPID RICH PLAQUE: ICD-10-CM

## 2019-09-19 DIAGNOSIS — N39.0 URINARY TRACT INFECTION WITHOUT HEMATURIA, SITE UNSPECIFIED: ICD-10-CM

## 2019-09-19 DIAGNOSIS — I25.10 CORONARY ARTERY DISEASE DUE TO LIPID RICH PLAQUE: ICD-10-CM

## 2019-09-19 ASSESSMENT — MIFFLIN-ST. JEOR: SCORE: 1839.68

## 2019-09-23 ENCOUNTER — COMMUNICATION - HEALTHEAST (OUTPATIENT)
Dept: INTERNAL MEDICINE | Facility: CLINIC | Age: 69
End: 2019-09-23

## 2019-09-30 ENCOUNTER — AMBULATORY - HEALTHEAST (OUTPATIENT)
Dept: LAB | Facility: CLINIC | Age: 69
End: 2019-09-30

## 2019-09-30 DIAGNOSIS — N39.0 URINARY TRACT INFECTION: ICD-10-CM

## 2019-09-30 LAB
ALBUMIN UR-MCNC: NEGATIVE MG/DL
APPEARANCE UR: CLEAR
BILIRUB UR QL STRIP: NEGATIVE
COLOR UR AUTO: YELLOW
GLUCOSE UR STRIP-MCNC: NEGATIVE MG/DL
HGB UR QL STRIP: NEGATIVE
KETONES UR STRIP-MCNC: NEGATIVE MG/DL
LEUKOCYTE ESTERASE UR QL STRIP: NEGATIVE
NITRATE UR QL: NEGATIVE
PH UR STRIP: 6 [PH] (ref 5–8)
SP GR UR STRIP: 1.02 (ref 1–1.03)
UROBILINOGEN UR STRIP-ACNC: NORMAL

## 2019-10-07 ENCOUNTER — COMMUNICATION - HEALTHEAST (OUTPATIENT)
Dept: INTERNAL MEDICINE | Facility: CLINIC | Age: 69
End: 2019-10-07

## 2019-10-07 ENCOUNTER — OFFICE VISIT - HEALTHEAST (OUTPATIENT)
Dept: INTERNAL MEDICINE | Facility: CLINIC | Age: 69
End: 2019-10-07

## 2019-10-07 DIAGNOSIS — F41.0 PANIC ANXIETY SYNDROME: ICD-10-CM

## 2019-10-07 DIAGNOSIS — E11.9 TYPE 2 DIABETES MELLITUS (H): ICD-10-CM

## 2019-10-07 DIAGNOSIS — R00.2 PALPITATIONS: ICD-10-CM

## 2019-10-07 DIAGNOSIS — Z23 FLU VACCINE NEED: ICD-10-CM

## 2019-10-07 DIAGNOSIS — R30.0 DYSURIA: ICD-10-CM

## 2019-10-07 LAB
ALBUMIN UR-MCNC: NEGATIVE MG/DL
ANION GAP SERPL CALCULATED.3IONS-SCNC: 10 MMOL/L (ref 5–18)
APPEARANCE UR: CLEAR
ATRIAL RATE - MUSE: 66 BPM
BILIRUB UR QL STRIP: NEGATIVE
BUN SERPL-MCNC: 18 MG/DL (ref 8–22)
CALCIUM SERPL-MCNC: 10 MG/DL (ref 8.5–10.5)
CHLORIDE BLD-SCNC: 106 MMOL/L (ref 98–107)
CO2 SERPL-SCNC: 23 MMOL/L (ref 22–31)
COLOR UR AUTO: YELLOW
CREAT SERPL-MCNC: 0.93 MG/DL (ref 0.7–1.3)
DIASTOLIC BLOOD PRESSURE - MUSE: NORMAL
GFR SERPL CREATININE-BSD FRML MDRD: >60 ML/MIN/1.73M2
GLUCOSE BLD-MCNC: 103 MG/DL (ref 70–125)
GLUCOSE UR STRIP-MCNC: NEGATIVE MG/DL
HGB UR QL STRIP: NEGATIVE
INTERPRETATION ECG - MUSE: NORMAL
KETONES UR STRIP-MCNC: NEGATIVE MG/DL
LEUKOCYTE ESTERASE UR QL STRIP: NEGATIVE
NITRATE UR QL: NEGATIVE
P AXIS - MUSE: 36 DEGREES
PH UR STRIP: 7 [PH] (ref 5–8)
POTASSIUM BLD-SCNC: 4.5 MMOL/L (ref 3.5–5)
PR INTERVAL - MUSE: 184 MS
QRS DURATION - MUSE: 82 MS
QT - MUSE: 424 MS
QTC - MUSE: 444 MS
R AXIS - MUSE: 39 DEGREES
SODIUM SERPL-SCNC: 139 MMOL/L (ref 136–145)
SP GR UR STRIP: 1.02 (ref 1–1.03)
SYSTOLIC BLOOD PRESSURE - MUSE: NORMAL
T AXIS - MUSE: 18 DEGREES
UROBILINOGEN UR STRIP-ACNC: NORMAL
VENTRICULAR RATE- MUSE: 66 BPM

## 2019-10-07 ASSESSMENT — MIFFLIN-ST. JEOR: SCORE: 1849.88

## 2019-10-08 ENCOUNTER — COMMUNICATION - HEALTHEAST (OUTPATIENT)
Dept: INTERNAL MEDICINE | Facility: CLINIC | Age: 69
End: 2019-10-08

## 2019-10-08 LAB
CREAT UR-MCNC: 125.9 MG/DL
MICROALBUMIN UR-MCNC: 1.27 MG/DL (ref 0–1.99)
MICROALBUMIN/CREAT UR: 10.1 MG/G

## 2019-10-14 ENCOUNTER — AMBULATORY - HEALTHEAST (OUTPATIENT)
Dept: CARE COORDINATION | Facility: CLINIC | Age: 69
End: 2019-10-14

## 2019-10-14 ENCOUNTER — COMMUNICATION - HEALTHEAST (OUTPATIENT)
Dept: NURSING | Facility: CLINIC | Age: 69
End: 2019-10-14

## 2019-10-14 DIAGNOSIS — I10 ESSENTIAL HYPERTENSION: ICD-10-CM

## 2019-10-14 DIAGNOSIS — E11.9 TYPE 2 DIABETES MELLITUS WITHOUT COMPLICATION, WITHOUT LONG-TERM CURRENT USE OF INSULIN (H): ICD-10-CM

## 2019-10-14 DIAGNOSIS — I45.5 SINUS PAUSE: ICD-10-CM

## 2019-10-17 ENCOUNTER — COMMUNICATION - HEALTHEAST (OUTPATIENT)
Dept: NURSING | Facility: CLINIC | Age: 69
End: 2019-10-17

## 2019-10-21 ENCOUNTER — COMMUNICATION - HEALTHEAST (OUTPATIENT)
Dept: CARDIOLOGY | Facility: CLINIC | Age: 69
End: 2019-10-21

## 2019-10-23 ENCOUNTER — AMBULATORY - HEALTHEAST (OUTPATIENT)
Dept: CARDIOLOGY | Facility: CLINIC | Age: 69
End: 2019-10-23

## 2019-10-23 DIAGNOSIS — I45.5 SINUS PAUSE: ICD-10-CM

## 2019-10-23 DIAGNOSIS — R00.2 PALPITATIONS: ICD-10-CM

## 2019-10-23 DIAGNOSIS — Z00.6 CLINICAL TRIAL EXAM: ICD-10-CM

## 2019-10-23 LAB
ATRIAL RATE - MUSE: 60 BPM
DIASTOLIC BLOOD PRESSURE - MUSE: NORMAL
INTERPRETATION ECG - MUSE: NORMAL
P AXIS - MUSE: 44 DEGREES
PR INTERVAL - MUSE: 182 MS
QRS DURATION - MUSE: 88 MS
QT - MUSE: 414 MS
QTC - MUSE: 414 MS
R AXIS - MUSE: 28 DEGREES
SYSTOLIC BLOOD PRESSURE - MUSE: NORMAL
T AXIS - MUSE: 13 DEGREES
VENTRICULAR RATE- MUSE: 60 BPM

## 2019-10-23 ASSESSMENT — MIFFLIN-ST. JEOR: SCORE: 1844.21

## 2019-10-25 ENCOUNTER — RECORDS - HEALTHEAST (OUTPATIENT)
Dept: ADMINISTRATIVE | Facility: OTHER | Age: 69
End: 2019-10-25

## 2019-10-28 ENCOUNTER — HOSPITAL ENCOUNTER (OUTPATIENT)
Dept: CARDIOLOGY | Facility: CLINIC | Age: 69
Discharge: HOME | End: 2019-10-28
Attending: HOSPITALIST

## 2019-10-28 ENCOUNTER — HOSPITAL ENCOUNTER (OUTPATIENT)
Dept: NUCLEAR MEDICINE | Facility: CLINIC | Age: 69
Discharge: HOME | End: 2019-10-28
Attending: HOSPITALIST

## 2019-10-28 DIAGNOSIS — R00.2 PALPITATIONS: ICD-10-CM

## 2019-10-28 DIAGNOSIS — R07.89 ATYPICAL CHEST PAIN: ICD-10-CM

## 2019-10-28 LAB
CV STRESS CURRENT DIA BP HE: 71
CV STRESS CURRENT DIA BP HE: 71
CV STRESS CURRENT DIA BP HE: 78
CV STRESS CURRENT DIA BP HE: 81
CV STRESS CURRENT DIA BP HE: 83
CV STRESS CURRENT HR HE: 102
CV STRESS CURRENT HR HE: 102
CV STRESS CURRENT HR HE: 109
CV STRESS CURRENT HR HE: 112
CV STRESS CURRENT HR HE: 114
CV STRESS CURRENT HR HE: 115
CV STRESS CURRENT HR HE: 63
CV STRESS CURRENT HR HE: 68
CV STRESS CURRENT HR HE: 81
CV STRESS CURRENT HR HE: 81
CV STRESS CURRENT HR HE: 83
CV STRESS CURRENT HR HE: 83
CV STRESS CURRENT HR HE: 88
CV STRESS CURRENT HR HE: 90
CV STRESS CURRENT HR HE: 90
CV STRESS CURRENT HR HE: NORMAL
CV STRESS CURRENT SYS BP HE: 123
CV STRESS CURRENT SYS BP HE: 127
CV STRESS CURRENT SYS BP HE: 129
CV STRESS CURRENT SYS BP HE: 129
CV STRESS CURRENT SYS BP HE: 153
CV STRESS CURRENT SYS BP HE: 167
CV STRESS CURRENT SYS BP HE: 167
CV STRESS DEVIATION TIME HE: NORMAL
CV STRESS ECHO PERCENT HR HE: NORMAL
CV STRESS EXERCISE STAGE HE: NORMAL
CV STRESS FINAL RESTING BP DIASTOLIC HE: 81
CV STRESS FINAL RESTING BP SYSTOLIC HE: 123
CV STRESS FINAL RESTING HR HE: 81
CV STRESS MAX HR HE: 118
CV STRESS MAX TREADMILL GRADE HE: 0
CV STRESS MAX TREADMILL SPEED HE: 0
CV STRESS PEAK DIA BP DIA HE: 83
CV STRESS PEAK DIA BP SYS HE: 153
CV STRESS PEAK SYS BP DIA HE: 78
CV STRESS PEAK SYS BP SYS HE: 167
CV STRESS PHASE HE: NORMAL
CV STRESS PROTOCOL HE: NORMAL
CV STRESS RESTING PT POSITION HE: NORMAL
CV STRESS ST DEVIATION AMOUNT HE: NORMAL
CV STRESS ST DEVIATION ELEVATION HE: NORMAL
CV STRESS ST EVELATION AMOUNT HE: NORMAL
CV STRESS TEST TYPE HE: NORMAL
CV STRESS TOTAL STAGE TIME MIN 1 HE: NORMAL
RATE PRESSURE PRODUCT: NORMAL
STRESS ECHO BASELINE DIASTOLIC HE: 78
STRESS ECHO BASELINE HR: 65
STRESS ECHO BASELINE SYSTOLIC BP: 127
STRESS ECHO CALCULATED PERCENT HR: 78 %
STRESS ECHO LAST STRESS DIASTOLIC BP: 83
STRESS ECHO LAST STRESS HR: 102
STRESS ECHO LAST STRESS SYSTOLIC BP: 153
STRESS ECHO TARGET HR: 151

## 2019-11-01 ENCOUNTER — OFFICE VISIT - HEALTHEAST (OUTPATIENT)
Dept: CARDIOLOGY | Facility: CLINIC | Age: 69
End: 2019-11-01

## 2019-11-01 DIAGNOSIS — I45.5 SINUS PAUSE: ICD-10-CM

## 2019-11-01 DIAGNOSIS — R00.2 PALPITATIONS: ICD-10-CM

## 2019-11-01 DIAGNOSIS — I25.83 CORONARY ARTERY DISEASE DUE TO LIPID RICH PLAQUE: ICD-10-CM

## 2019-11-01 DIAGNOSIS — I25.10 CORONARY ARTERY DISEASE DUE TO LIPID RICH PLAQUE: ICD-10-CM

## 2019-11-01 DIAGNOSIS — R07.89 ATYPICAL CHEST PAIN: ICD-10-CM

## 2019-11-01 DIAGNOSIS — G47.33 OBSTRUCTIVE SLEEP APNEA SYNDROME: ICD-10-CM

## 2019-11-01 RX ORDER — FEXOFENADINE HCL 180 MG/1
180 TABLET ORAL PRN
Status: SHIPPED | COMMUNITY
Start: 2007-07-02 | End: 2023-10-12

## 2019-11-01 ASSESSMENT — MIFFLIN-ST. JEOR: SCORE: 1851.79

## 2019-11-04 ENCOUNTER — COMMUNICATION - HEALTHEAST (OUTPATIENT)
Dept: INTERNAL MEDICINE | Facility: CLINIC | Age: 69
End: 2019-11-04

## 2019-11-06 ENCOUNTER — COMMUNICATION - HEALTHEAST (OUTPATIENT)
Dept: INTERNAL MEDICINE | Facility: CLINIC | Age: 69
End: 2019-11-06

## 2019-11-12 ENCOUNTER — AMBULATORY - HEALTHEAST (OUTPATIENT)
Dept: CARDIOLOGY | Facility: CLINIC | Age: 69
End: 2019-11-12

## 2019-11-12 DIAGNOSIS — R00.2 PALPITATIONS: ICD-10-CM

## 2019-11-25 ENCOUNTER — COMMUNICATION - HEALTHEAST (OUTPATIENT)
Dept: INTERNAL MEDICINE | Facility: CLINIC | Age: 69
End: 2019-11-25

## 2019-11-25 ENCOUNTER — HOSPITAL ENCOUNTER (OUTPATIENT)
Dept: CARDIOLOGY | Facility: HOSPITAL | Age: 69
Discharge: HOME | End: 2019-11-25
Attending: INTERNAL MEDICINE

## 2019-11-25 DIAGNOSIS — R00.2 PALPITATIONS: ICD-10-CM

## 2019-12-02 ENCOUNTER — RECORDS - HEALTHEAST (OUTPATIENT)
Dept: ADMINISTRATIVE | Facility: OTHER | Age: 69
End: 2019-12-02

## 2019-12-02 LAB — RETINOPATHY: NEGATIVE

## 2019-12-10 ENCOUNTER — RECORDS - HEALTHEAST (OUTPATIENT)
Dept: HEALTH INFORMATION MANAGEMENT | Facility: CLINIC | Age: 69
End: 2019-12-10

## 2019-12-10 ENCOUNTER — AMBULATORY - HEALTHEAST (OUTPATIENT)
Dept: CARDIOLOGY | Facility: CLINIC | Age: 69
End: 2019-12-10

## 2019-12-12 ENCOUNTER — RECORDS - HEALTHEAST (OUTPATIENT)
Dept: ADMINISTRATIVE | Facility: OTHER | Age: 69
End: 2019-12-12

## 2019-12-12 ENCOUNTER — OFFICE VISIT - HEALTHEAST (OUTPATIENT)
Dept: CARDIOLOGY | Facility: CLINIC | Age: 69
End: 2019-12-12

## 2019-12-12 ENCOUNTER — OFFICE VISIT - HEALTHEAST (OUTPATIENT)
Dept: SLEEP MEDICINE | Facility: CLINIC | Age: 69
End: 2019-12-12

## 2019-12-12 DIAGNOSIS — I25.83 CORONARY ARTERY DISEASE DUE TO LIPID RICH PLAQUE: ICD-10-CM

## 2019-12-12 DIAGNOSIS — R00.2 PALPITATIONS: ICD-10-CM

## 2019-12-12 DIAGNOSIS — I25.10 CORONARY ARTERY DISEASE DUE TO LIPID RICH PLAQUE: ICD-10-CM

## 2019-12-12 DIAGNOSIS — I10 ESSENTIAL HYPERTENSION: ICD-10-CM

## 2019-12-12 DIAGNOSIS — G47.33 OSA ON CPAP: ICD-10-CM

## 2019-12-12 DIAGNOSIS — G47.33 OBSTRUCTIVE SLEEP APNEA SYNDROME: ICD-10-CM

## 2019-12-12 ASSESSMENT — MIFFLIN-ST. JEOR
SCORE: 1889.89
SCORE: 1885.04

## 2019-12-13 ENCOUNTER — RECORDS - HEALTHEAST (OUTPATIENT)
Dept: ADMINISTRATIVE | Facility: OTHER | Age: 69
End: 2019-12-13

## 2019-12-13 ENCOUNTER — AMBULATORY - HEALTHEAST (OUTPATIENT)
Dept: SLEEP MEDICINE | Facility: CLINIC | Age: 69
End: 2019-12-13

## 2020-01-02 ENCOUNTER — OFFICE VISIT - HEALTHEAST (OUTPATIENT)
Dept: INTERNAL MEDICINE | Facility: CLINIC | Age: 70
End: 2020-01-02

## 2020-01-02 DIAGNOSIS — F41.9 ANXIETY: ICD-10-CM

## 2020-01-02 DIAGNOSIS — R79.0 LOW MAGNESIUM LEVEL: ICD-10-CM

## 2020-01-02 ASSESSMENT — MIFFLIN-ST. JEOR: SCORE: 1889.57

## 2020-02-06 ENCOUNTER — HOSPITAL ENCOUNTER (OUTPATIENT)
Dept: ULTRASOUND IMAGING | Facility: HOSPITAL | Age: 70
Discharge: HOME | End: 2020-02-06
Attending: INTERNAL MEDICINE

## 2020-02-06 ENCOUNTER — OFFICE VISIT - HEALTHEAST (OUTPATIENT)
Dept: INTERNAL MEDICINE | Facility: CLINIC | Age: 70
End: 2020-02-06

## 2020-02-06 DIAGNOSIS — R30.0 DYSURIA: ICD-10-CM

## 2020-02-06 DIAGNOSIS — Z12.5 SCREENING FOR PROSTATE CANCER: ICD-10-CM

## 2020-02-06 DIAGNOSIS — N40.0 PROSTATISM: ICD-10-CM

## 2020-02-06 LAB
ALBUMIN UR-MCNC: NEGATIVE MG/DL
ANION GAP SERPL CALCULATED.3IONS-SCNC: 10 MMOL/L (ref 5–18)
APPEARANCE UR: CLEAR
BILIRUB UR QL STRIP: NEGATIVE
BUN SERPL-MCNC: 16 MG/DL (ref 8–28)
CALCIUM SERPL-MCNC: 9.8 MG/DL (ref 8.5–10.5)
CHLORIDE BLD-SCNC: 105 MMOL/L (ref 98–107)
CO2 SERPL-SCNC: 23 MMOL/L (ref 22–31)
COLOR UR AUTO: YELLOW
CREAT SERPL-MCNC: 0.88 MG/DL (ref 0.7–1.3)
GFR SERPL CREATININE-BSD FRML MDRD: >60 ML/MIN/1.73M2
GLUCOSE BLD-MCNC: 98 MG/DL (ref 70–125)
GLUCOSE UR STRIP-MCNC: NEGATIVE MG/DL
HGB UR QL STRIP: NEGATIVE
KETONES UR STRIP-MCNC: NEGATIVE MG/DL
LEUKOCYTE ESTERASE UR QL STRIP: NEGATIVE
NITRATE UR QL: NEGATIVE
PH UR STRIP: 5.5 [PH] (ref 5–8)
POTASSIUM BLD-SCNC: 4.5 MMOL/L (ref 3.5–5)
PSA SERPL-MCNC: 3.1 NG/ML (ref 0–6.5)
SODIUM SERPL-SCNC: 138 MMOL/L (ref 136–145)
SP GR UR STRIP: 1.02 (ref 1–1.03)
UROBILINOGEN UR STRIP-ACNC: NORMAL

## 2020-02-14 ENCOUNTER — COMMUNICATION - HEALTHEAST (OUTPATIENT)
Dept: INTERNAL MEDICINE | Facility: CLINIC | Age: 70
End: 2020-02-14

## 2020-02-14 DIAGNOSIS — R30.0 DYSURIA: ICD-10-CM

## 2020-02-15 ENCOUNTER — COMMUNICATION - HEALTHEAST (OUTPATIENT)
Dept: INTERNAL MEDICINE | Facility: CLINIC | Age: 70
End: 2020-02-15

## 2020-02-15 DIAGNOSIS — J31.0 CHRONIC RHINITIS: ICD-10-CM

## 2020-02-18 ENCOUNTER — OFFICE VISIT - HEALTHEAST (OUTPATIENT)
Dept: SLEEP MEDICINE | Facility: CLINIC | Age: 70
End: 2020-02-18

## 2020-02-18 DIAGNOSIS — G47.33 OSA ON CPAP: ICD-10-CM

## 2020-02-18 DIAGNOSIS — G47.10 HYPERSOMNIA: ICD-10-CM

## 2020-02-18 ASSESSMENT — MIFFLIN-ST. JEOR: SCORE: 1901.47

## 2020-02-19 ENCOUNTER — COMMUNICATION - HEALTHEAST (OUTPATIENT)
Dept: SLEEP MEDICINE | Facility: CLINIC | Age: 70
End: 2020-02-19

## 2020-02-19 DIAGNOSIS — G47.33 OSA ON CPAP: ICD-10-CM

## 2020-03-11 ENCOUNTER — AMBULATORY - HEALTHEAST (OUTPATIENT)
Dept: PULMONOLOGY | Facility: OTHER | Age: 70
End: 2020-03-11

## 2020-03-11 DIAGNOSIS — J45.30 MILD PERSISTENT ASTHMA WITHOUT COMPLICATION: ICD-10-CM

## 2020-03-11 DIAGNOSIS — R06.00 DYSPNEA, UNSPECIFIED TYPE: ICD-10-CM

## 2020-03-25 ENCOUNTER — OFFICE VISIT - HEALTHEAST (OUTPATIENT)
Dept: CARDIOLOGY | Facility: CLINIC | Age: 70
End: 2020-03-25

## 2020-03-25 DIAGNOSIS — I25.10 CORONARY ARTERY DISEASE INVOLVING NATIVE CORONARY ARTERY OF NATIVE HEART WITHOUT ANGINA PECTORIS: ICD-10-CM

## 2020-03-25 ASSESSMENT — MIFFLIN-ST. JEOR: SCORE: 1888.44

## 2020-03-27 ENCOUNTER — RECORDS - HEALTHEAST (OUTPATIENT)
Dept: ADMINISTRATIVE | Facility: OTHER | Age: 70
End: 2020-03-27

## 2020-04-03 ENCOUNTER — RECORDS - HEALTHEAST (OUTPATIENT)
Dept: ADMINISTRATIVE | Facility: OTHER | Age: 70
End: 2020-04-03

## 2020-04-07 ENCOUNTER — COMMUNICATION - HEALTHEAST (OUTPATIENT)
Dept: PULMONOLOGY | Facility: OTHER | Age: 70
End: 2020-04-07

## 2020-04-07 ENCOUNTER — AMBULATORY - HEALTHEAST (OUTPATIENT)
Dept: PULMONOLOGY | Facility: OTHER | Age: 70
End: 2020-04-07

## 2020-04-07 DIAGNOSIS — J45.30 MILD PERSISTENT ASTHMA WITHOUT COMPLICATION: ICD-10-CM

## 2020-04-07 DIAGNOSIS — R06.00 DYSPNEA, UNSPECIFIED TYPE: ICD-10-CM

## 2020-04-17 ENCOUNTER — COMMUNICATION - HEALTHEAST (OUTPATIENT)
Dept: PULMONOLOGY | Facility: OTHER | Age: 70
End: 2020-04-17

## 2020-04-17 DIAGNOSIS — J45.30 MILD PERSISTENT ASTHMA WITHOUT COMPLICATION: ICD-10-CM

## 2020-04-20 ENCOUNTER — AMBULATORY - HEALTHEAST (OUTPATIENT)
Dept: PULMONOLOGY | Facility: OTHER | Age: 70
End: 2020-04-20

## 2020-04-20 ENCOUNTER — OFFICE VISIT - HEALTHEAST (OUTPATIENT)
Dept: PULMONOLOGY | Facility: OTHER | Age: 70
End: 2020-04-20

## 2020-04-20 DIAGNOSIS — K21.9 GASTROESOPHAGEAL REFLUX DISEASE WITHOUT ESOPHAGITIS: ICD-10-CM

## 2020-04-20 DIAGNOSIS — J30.9 ALLERGIC RHINITIS, UNSPECIFIED SEASONALITY, UNSPECIFIED TRIGGER: ICD-10-CM

## 2020-04-20 DIAGNOSIS — J45.41 MODERATE PERSISTENT ASTHMA WITH EXACERBATION: ICD-10-CM

## 2020-04-20 DIAGNOSIS — R06.03 ACUTE RESPIRATORY DISTRESS: ICD-10-CM

## 2020-04-20 DIAGNOSIS — J45.30 MILD PERSISTENT ASTHMA WITHOUT COMPLICATION: ICD-10-CM

## 2020-04-20 DIAGNOSIS — G47.33 OSA (OBSTRUCTIVE SLEEP APNEA): ICD-10-CM

## 2020-04-20 ASSESSMENT — MIFFLIN-ST. JEOR: SCORE: 1888.44

## 2020-05-28 NOTE — PROGRESS NOTES
Chief Complaint - Hearing loss    History of Present Illness - Nadir Peck is a 70 year old male who presents to me today with hearing loss in both ears. 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 Now for approximately a year he notes intermittent ringing and loss of hearing. Happens in both ears. Happens 4-5 times per day, but then comes back. Lasts a few minutes or hours. No dizziness. There is no history of chronic ear disease or ear surgery. Has multiple relatives with hearing loss. The patient denies otorrhea and otalgia. He uses hearing aids, but still has some difficulty hearing.     Past Medical History -   - has CAD s/p stents  - asthma  - s/p carotid endarterectomy    Social History -   Social History     Socioeconomic History     Marital status:      Spouse name: Not on file     Number of children: Not on file     Years of education: Not on file     Highest education level: Not on file   Occupational History     Not on file   Social Needs     Financial resource strain: Not on file     Food insecurity     Worry: Not on file     Inability: Not on file     Transportation needs     Medical: Not on file     Non-medical: Not on file   Tobacco Use     Smoking status: Not on file   Substance and Sexual Activity     Alcohol use: Not on file     Drug use: Not on file     Sexual activity: Not on file   Lifestyle     Physical activity     Days per week: Not on file     Minutes per session: Not on file     Stress: Not on file   Relationships     Social connections     Talks on phone: Not on file     Gets together: Not on file     Attends Orthodoxy service: Not on file     Active member of club or organization: Not on file     Attends meetings of clubs or organizations: Not on file     Relationship status: Not on file     Intimate partner violence     Fear of current or ex partner: Not on file     Emotionally abused: Not on file     Physically abused: Not on file     Forced sexual activity: Not on file   Other  Topics Concern     Not on file   Social History Narrative     Not on file   Never smoker    Family History - see HPI    Review of Systems - As per HPI and PMHx, otherwise 7 system review is negative.    Physical Exam  BP 90/62   Pulse 92   Temp 97.7  F (36.5  C) (Oral)   Wt 109.9 kg (242 lb 3.2 oz)   SpO2 96%   General - The patient is in no distress.  Alert and oriented to person and place, answers questions and cooperates with examination appropriately.   Voice and Breathing - The patient was breathing comfortably without the use of accessory muscles. There was no wheezing, stridor, or stertor.  The patients voice was clear and strong.  Ears - The auricles are normal. The tympanic membranes are normal in appearance, bony landmarks are intact.  No retraction, perforation, or masses.  No fluid or purulence was seen in the external canal or the middle ear. No evidence of infection of the middle ear or external canal, cerumen was normal in appearance.  Eyes - Extraocular movements intact.  Sclera were not icteric or injected.  Neck - Soft, non-tender. Palpation of the occipital, submental, submandibular, internal jugular chain, and supraclavicular nodes did not demonstrate any abnormal lymph nodes or masses. Parotid glands had no masses. Palpation of the thyroid was soft and smooth, with no nodules or goiter appreciated.  The trachea was mobile and midline.  Neurological - Cranial nerves 2 through 12 were grossly intact. House-Brackmann grade 1 out of 6 bilaterally.       Audiologic Studies - An audiogram and tympanogram were performed today as part of the evaluation and personally reviewed. The tympanogram shows a normal Type A curve, with normal canal volume and middle ear pressure.  There is no sign of eustachian tube dysfunction or middle ear effusion.  The audiogram showed a significant down-sloping low to high frequency sensorineural hearing loss. Both ears. Poor word recognition score on the right.      Assessment and Plan - Nadir Peck is a 70 year old male who presents to me today with sensorineural hearing loss.  This is been longstanding he states he was actually born with it.  It seems to be worsening but he specifically states that his hearing will intermittently cut out and come back.  Unsure if this is truly happening or if he is just developing worse hearing loss and intermittent tinnitus.  I offered an MRI brain to look for anything more complicated but he deferred.  He should make sure he is using his hearing aids and avoiding noise.  If he develops vertigo, worsening hearing, or sudden hearing this not returning he should return for repeat audiogram.    Gomez Perdomo MD  Otolaryngology  [facility]

## 2020-05-29 ENCOUNTER — OFFICE VISIT (OUTPATIENT)
Dept: OTOLARYNGOLOGY | Facility: CLINIC | Age: 70
End: 2020-05-29
Payer: COMMERCIAL

## 2020-05-29 ENCOUNTER — OFFICE VISIT (OUTPATIENT)
Dept: AUDIOLOGY | Facility: CLINIC | Age: 70
End: 2020-05-29
Payer: COMMERCIAL

## 2020-05-29 VITALS
OXYGEN SATURATION: 96 % | DIASTOLIC BLOOD PRESSURE: 62 MMHG | SYSTOLIC BLOOD PRESSURE: 90 MMHG | TEMPERATURE: 97.7 F | WEIGHT: 242.2 LBS | HEART RATE: 92 BPM

## 2020-05-29 DIAGNOSIS — H90.3 SENSORINEURAL HEARING LOSS, BILATERAL: Primary | ICD-10-CM

## 2020-05-29 DIAGNOSIS — H90.3 SENSORINEURAL HEARING LOSS (SNHL) OF BOTH EARS: Primary | ICD-10-CM

## 2020-05-29 PROCEDURE — 99203 OFFICE O/P NEW LOW 30 MIN: CPT | Performed by: OTOLARYNGOLOGY

## 2020-05-29 PROCEDURE — 99207 ZZC NO CHARGE LOS: CPT | Performed by: AUDIOLOGIST

## 2020-05-29 PROCEDURE — 92550 TYMPANOMETRY & REFLEX THRESH: CPT | Performed by: AUDIOLOGIST

## 2020-05-29 PROCEDURE — 92557 COMPREHENSIVE HEARING TEST: CPT | Performed by: AUDIOLOGIST

## 2020-05-29 RX ORDER — NITROGLYCERIN 0.3 MG/1
TABLET SUBLINGUAL
COMMUNITY
Start: 2019-10-22 | End: 2022-01-13

## 2020-05-29 RX ORDER — LORAZEPAM 1 MG/1
TABLET ORAL
COMMUNITY
Start: 2020-03-11 | End: 2022-10-10

## 2020-05-29 RX ORDER — TAMSULOSIN HYDROCHLORIDE 0.4 MG/1
CAPSULE ORAL
COMMUNITY
Start: 2020-05-14 | End: 2023-10-12

## 2020-05-29 RX ORDER — LEVOFLOXACIN 500 MG/1
TABLET, FILM COATED ORAL
COMMUNITY
Start: 2020-03-27 | End: 2022-06-06

## 2020-05-29 RX ORDER — ATORVASTATIN CALCIUM 40 MG/1
TABLET, FILM COATED ORAL
COMMUNITY
Start: 2019-05-31 | End: 2022-06-06

## 2020-05-29 RX ORDER — CLOPIDOGREL BISULFATE 75 MG/1
TABLET ORAL
COMMUNITY
Start: 2020-05-16 | End: 2022-06-06

## 2020-05-29 RX ORDER — GABAPENTIN 100 MG/1
300 CAPSULE ORAL 3 TIMES DAILY
COMMUNITY
Start: 2020-04-20

## 2020-05-29 RX ORDER — AZELASTINE HYDROCHLORIDE 137 UG/1
SPRAY, METERED NASAL
COMMUNITY
Start: 2019-10-22 | End: 2022-06-06

## 2020-05-29 RX ORDER — CHLORTHALIDONE 25 MG/1
TABLET ORAL
COMMUNITY
Start: 2020-05-16 | End: 2022-06-06

## 2020-05-29 RX ORDER — TICAGRELOR 90 MG/1
TABLET ORAL
COMMUNITY
Start: 2020-02-13 | End: 2022-06-06

## 2020-05-29 RX ORDER — ESCITALOPRAM OXALATE 10 MG/1
TABLET ORAL
COMMUNITY
Start: 2020-03-24 | End: 2022-10-13

## 2020-05-29 RX ORDER — DOXYCYCLINE 100 MG/1
TABLET ORAL
COMMUNITY
Start: 2020-03-27 | End: 2022-06-06

## 2020-05-29 RX ORDER — FLUTICASONE FUROATE 200 UG/1
POWDER RESPIRATORY (INHALATION)
COMMUNITY
Start: 2020-04-07 | End: 2022-08-29

## 2020-05-29 RX ORDER — PHENAZOPYRIDINE HYDROCHLORIDE 100 MG/1
TABLET, FILM COATED ORAL
COMMUNITY
Start: 2020-03-24 | End: 2022-06-06

## 2020-05-29 NOTE — LETTER
5/29/2020         RE: Nadir Peck  [id number] [address] [facility] [id number]        Dear Colleague,    Thank you for referring your patient, Nadir Peck, to the [facility]. Please see a copy of my visit note below.    Chief Complaint - Hearing loss    History of Present Illness - Nadir Peck is a 70 year old male who presents to me today with hearing loss in both ears. 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 Now for approximately a year he notes intermittent ringing and loss of hearing. Happens in both ears. Happens 4-5 times per day, but then comes back. Lasts a few minutes or hours. No dizziness. There is no history of chronic ear disease or ear surgery. Has multiple relatives with hearing loss. The patient denies otorrhea and otalgia. He uses hearing aids, but still has some difficulty hearing.     Past Medical History -   - has CAD s/p stents  - asthma  - s/p carotid endarterectomy    Social History -   Social History     Socioeconomic History     Marital status:      Spouse name: Not on file     Number of children: Not on file     Years of education: Not on file     Highest education level: Not on file   Occupational History     Not on file   Social Needs     Financial resource strain: Not on file     Food insecurity     Worry: Not on file     Inability: Not on file     Transportation needs     Medical: Not on file     Non-medical: Not on file   Tobacco Use     Smoking status: Not on file   Substance and Sexual Activity     Alcohol use: Not on file     Drug use: Not on file     Sexual activity: Not on file   Lifestyle     Physical activity     Days per week: Not on file     Minutes per session: Not on file     Stress: Not on file   Relationships     Social connections     Talks on phone: Not on file     Gets together: Not on file     Attends Protestant service: Not on file     Active member of club or organization: Not on file     Attends meetings of clubs or organizations:  Not on file     Relationship status: Not on file     Intimate partner violence     Fear of current or ex partner: Not on file     Emotionally abused: Not on file     Physically abused: Not on file     Forced sexual activity: Not on file   Other Topics Concern     Not on file   Social History Narrative     Not on file   Never smoker    Family History - see HPI    Review of Systems - As per HPI and PMHx, otherwise 7 system review is negative.    Physical Exam  BP 90/62   Pulse 92   Temp 97.7  F (36.5  C) (Oral)   Wt 109.9 kg (242 lb 3.2 oz)   SpO2 96%   General - The patient is in no distress.  Alert and oriented to person and place, answers questions and cooperates with examination appropriately.   Voice and Breathing - The patient was breathing comfortably without the use of accessory muscles. There was no wheezing, stridor, or stertor.  The patients voice was clear and strong.  Ears - The auricles are normal. The tympanic membranes are normal in appearance, bony landmarks are intact.  No retraction, perforation, or masses.  No fluid or purulence was seen in the external canal or the middle ear. No evidence of infection of the middle ear or external canal, cerumen was normal in appearance.  Eyes - Extraocular movements intact.  Sclera were not icteric or injected.  Neck - Soft, non-tender. Palpation of the occipital, submental, submandibular, internal jugular chain, and supraclavicular nodes did not demonstrate any abnormal lymph nodes or masses. Parotid glands had no masses. Palpation of the thyroid was soft and smooth, with no nodules or goiter appreciated.  The trachea was mobile and midline.  Neurological - Cranial nerves 2 through 12 were grossly intact. House-Brackmann grade 1 out of 6 bilaterally.       Audiologic Studies - An audiogram and tympanogram were performed today as part of the evaluation and personally reviewed. The tympanogram shows a normal Type A curve, with normal canal volume and middle  ear pressure.  There is no sign of eustachian tube dysfunction or middle ear effusion.  The audiogram showed a significant down-sloping low to high frequency sensorineural hearing loss. Both ears. Poor word recognition score on the right.     Assessment and Plan - Nadir Peck is a 70 year old male who presents to me today with sensorineural hearing loss.  This is been longstanding he states he was actually born with it.  It seems to be worsening but he specifically states that his hearing will intermittently cut out and come back.  Unsure if this is truly happening or if he is just developing worse hearing loss and intermittent tinnitus.  I offered an MRI brain to look for anything more complicated but he deferred.  He should make sure he is using his hearing aids and avoiding noise.  If he develops vertigo, worsening hearing, or sudden hearing this not returning he should return for repeat audiogram.    Gomez Perdomo MD  Otolaryngology  [facility]        Again, thank you for allowing me to participate in the care of your patient.        Sincerely,        Gomez Perdomo MD

## 2020-05-29 NOTE — PROGRESS NOTES
AUDIOLOGY REPORT:    Patient was referred to [facility] Audiology from ENT by Dr. Perdomo for a hearing examination. Patient reports a fluctuation in his life long hearing loss over the past 6 months with periods of decline and improvement. Patient was unaccompanied to today's visit.     Testing:    Otoscopy:   Otoscopic exam indicates ears are clear of cerumen bilaterally     Tympanograms:    RIGHT: normal eardrum mobility     LEFT:   normal eardrum mobility    Reflexes (reported by stimulus ear): 1000 Hz  RIGHT: Ipsilateral is absent at frequencies tested  RIGHT: Contralateral is absent at frequencies tested  LEFT:   Ipsilateral is absent at frequencies tested  LEFT:   Contralateral is absent at frequencies tested    Thresholds:   Pure Tone Thresholds assessed using conventional audiometry with good  reliability from 250-8000 Hz bilaterally using insert earphones and circumaural headphones     RIGHT:  mild and profound sensorineural hearing loss    LEFT:    mild and severe sensorineural hearing loss    NOTE: could not mask for right ear bone conduction at 4000 Hz due to equipment limits.     Speech Reception Threshold:    RIGHT: 50 dB HL    LEFT:   60 dB HL    Word Recognition Score:     RIGHT: 48% at 90 dB HL using NU-6 recorded word list.    LEFT:   92% at 95 dB HL using NU-6 recorded word list.    Discussed results with the patient.     Patient was returned to ENT for follow up.     Bladimir Gray CCC-A  Licensed Audiologist  5/29/2020

## 2020-06-12 ENCOUNTER — RECORDS - HEALTHEAST (OUTPATIENT)
Dept: ADMINISTRATIVE | Facility: OTHER | Age: 70
End: 2020-06-12

## 2020-06-19 ENCOUNTER — COMMUNICATION - HEALTHEAST (OUTPATIENT)
Dept: INTERNAL MEDICINE | Facility: CLINIC | Age: 70
End: 2020-06-19

## 2020-06-19 DIAGNOSIS — E78.5 DYSLIPIDEMIA, GOAL LDL BELOW 70: ICD-10-CM

## 2020-08-06 ENCOUNTER — OFFICE VISIT - HEALTHEAST (OUTPATIENT)
Dept: INTERNAL MEDICINE | Facility: CLINIC | Age: 70
End: 2020-08-06

## 2020-08-06 DIAGNOSIS — K21.00 REFLUX ESOPHAGITIS: ICD-10-CM

## 2020-08-06 DIAGNOSIS — I10 ESSENTIAL HYPERTENSION: ICD-10-CM

## 2020-08-06 DIAGNOSIS — I25.10 CORONARY ARTERY DISEASE DUE TO LIPID RICH PLAQUE: ICD-10-CM

## 2020-08-06 DIAGNOSIS — I25.83 CORONARY ARTERY DISEASE DUE TO LIPID RICH PLAQUE: ICD-10-CM

## 2020-08-06 DIAGNOSIS — E11.9 TYPE 2 DIABETES MELLITUS WITHOUT COMPLICATION, WITHOUT LONG-TERM CURRENT USE OF INSULIN (H): ICD-10-CM

## 2020-08-06 DIAGNOSIS — E78.5 HYPERLIPIDEMIA LDL GOAL <100: ICD-10-CM

## 2020-08-06 DIAGNOSIS — G47.33 OBSTRUCTIVE SLEEP APNEA SYNDROME: ICD-10-CM

## 2020-08-06 DIAGNOSIS — N40.1 BENIGN PROSTATIC HYPERPLASIA WITH URINARY HESITANCY: ICD-10-CM

## 2020-08-06 DIAGNOSIS — R39.11 BENIGN PROSTATIC HYPERPLASIA WITH URINARY HESITANCY: ICD-10-CM

## 2020-08-06 LAB
ALBUMIN SERPL-MCNC: 4.3 G/DL (ref 3.5–5)
ALP SERPL-CCNC: 54 U/L (ref 45–120)
ALT SERPL W P-5'-P-CCNC: 21 U/L (ref 0–45)
ANION GAP SERPL CALCULATED.3IONS-SCNC: 13 MMOL/L (ref 5–18)
AST SERPL W P-5'-P-CCNC: 16 U/L (ref 0–40)
BILIRUB SERPL-MCNC: 0.8 MG/DL (ref 0–1)
BUN SERPL-MCNC: 19 MG/DL (ref 8–28)
CALCIUM SERPL-MCNC: 9.9 MG/DL (ref 8.5–10.5)
CHLORIDE BLD-SCNC: 99 MMOL/L (ref 98–107)
CHOLEST SERPL-MCNC: 130 MG/DL
CO2 SERPL-SCNC: 26 MMOL/L (ref 22–31)
CREAT SERPL-MCNC: 0.88 MG/DL (ref 0.7–1.3)
FASTING STATUS PATIENT QL REPORTED: YES
GFR SERPL CREATININE-BSD FRML MDRD: >60 ML/MIN/1.73M2
GLUCOSE BLD-MCNC: 118 MG/DL (ref 70–125)
HBA1C MFR BLD: 7 %
HDLC SERPL-MCNC: 39 MG/DL
LDLC SERPL CALC-MCNC: 66 MG/DL
POTASSIUM BLD-SCNC: 4.1 MMOL/L (ref 3.5–5)
PROT SERPL-MCNC: 7.4 G/DL (ref 6–8)
SODIUM SERPL-SCNC: 138 MMOL/L (ref 136–145)
TRIGL SERPL-MCNC: 126 MG/DL

## 2020-08-06 ASSESSMENT — MIFFLIN-ST. JEOR: SCORE: 1849.88

## 2020-08-07 ENCOUNTER — COMMUNICATION - HEALTHEAST (OUTPATIENT)
Dept: INTERNAL MEDICINE | Facility: CLINIC | Age: 70
End: 2020-08-07

## 2020-08-31 ENCOUNTER — COMMUNICATION - HEALTHEAST (OUTPATIENT)
Dept: INTERNAL MEDICINE | Facility: CLINIC | Age: 70
End: 2020-08-31

## 2020-08-31 DIAGNOSIS — J31.0 CHRONIC RHINITIS: ICD-10-CM

## 2020-09-01 RX ORDER — AZELASTINE 1 MG/ML
SPRAY, METERED NASAL
Qty: 30 ML | Refills: 5 | Status: SHIPPED | OUTPATIENT
Start: 2020-09-01 | End: 2024-04-17

## 2020-09-04 ENCOUNTER — COMMUNICATION - HEALTHEAST (OUTPATIENT)
Dept: CARDIOLOGY | Facility: CLINIC | Age: 70
End: 2020-09-04

## 2020-09-08 ENCOUNTER — COMMUNICATION - HEALTHEAST (OUTPATIENT)
Dept: CARDIOLOGY | Facility: CLINIC | Age: 70
End: 2020-09-08

## 2020-09-08 DIAGNOSIS — I25.83 CORONARY ARTERY DISEASE DUE TO LIPID RICH PLAQUE: ICD-10-CM

## 2020-09-08 DIAGNOSIS — R06.09 EXERTIONAL DYSPNEA: ICD-10-CM

## 2020-09-08 DIAGNOSIS — I25.10 CORONARY ARTERY DISEASE DUE TO LIPID RICH PLAQUE: ICD-10-CM

## 2020-09-08 DIAGNOSIS — R07.9 EXERTIONAL CHEST PAIN: ICD-10-CM

## 2020-09-11 ENCOUNTER — AMBULATORY - HEALTHEAST (OUTPATIENT)
Dept: CARDIOLOGY | Facility: CLINIC | Age: 70
End: 2020-09-11

## 2020-09-11 ENCOUNTER — OFFICE VISIT - HEALTHEAST (OUTPATIENT)
Dept: CARDIOLOGY | Facility: CLINIC | Age: 70
End: 2020-09-11

## 2020-09-11 DIAGNOSIS — I25.10 CORONARY ARTERY DISEASE DUE TO LIPID RICH PLAQUE: ICD-10-CM

## 2020-09-11 DIAGNOSIS — R06.09 EXERTIONAL DYSPNEA: ICD-10-CM

## 2020-09-11 DIAGNOSIS — R07.9 EXERTIONAL CHEST PAIN: ICD-10-CM

## 2020-09-11 DIAGNOSIS — E78.5 HYPERLIPIDEMIA LDL GOAL <70: ICD-10-CM

## 2020-09-11 DIAGNOSIS — I10 ESSENTIAL HYPERTENSION: ICD-10-CM

## 2020-09-11 DIAGNOSIS — Z11.59 ENCOUNTER FOR SCREENING FOR OTHER VIRAL DISEASES: ICD-10-CM

## 2020-09-11 DIAGNOSIS — I25.83 CORONARY ARTERY DISEASE DUE TO LIPID RICH PLAQUE: ICD-10-CM

## 2020-09-14 ENCOUNTER — SURGERY - HEALTHEAST (OUTPATIENT)
Dept: CARDIOLOGY | Facility: CLINIC | Age: 70
End: 2020-09-14

## 2020-09-14 ENCOUNTER — AMBULATORY - HEALTHEAST (OUTPATIENT)
Dept: LAB | Facility: CLINIC | Age: 70
End: 2020-09-14

## 2020-09-14 DIAGNOSIS — I25.10 CORONARY ARTERY DISEASE DUE TO LIPID RICH PLAQUE: ICD-10-CM

## 2020-09-14 DIAGNOSIS — Z11.59 ENCOUNTER FOR SCREENING FOR OTHER VIRAL DISEASES: ICD-10-CM

## 2020-09-14 DIAGNOSIS — I25.83 CORONARY ARTERY DISEASE DUE TO LIPID RICH PLAQUE: ICD-10-CM

## 2020-09-16 ENCOUNTER — COMMUNICATION - HEALTHEAST (OUTPATIENT)
Dept: CARDIOLOGY | Facility: CLINIC | Age: 70
End: 2020-09-16

## 2020-09-17 ENCOUNTER — SURGERY - HEALTHEAST (OUTPATIENT)
Dept: CARDIOLOGY | Facility: CLINIC | Age: 70
End: 2020-09-17

## 2020-09-17 ENCOUNTER — COMMUNICATION - HEALTHEAST (OUTPATIENT)
Dept: SCHEDULING | Facility: CLINIC | Age: 70
End: 2020-09-17

## 2020-09-17 ASSESSMENT — MIFFLIN-ST. JEOR: SCORE: 1867.12

## 2020-09-20 ENCOUNTER — COMMUNICATION - HEALTHEAST (OUTPATIENT)
Dept: INTERNAL MEDICINE | Facility: CLINIC | Age: 70
End: 2020-09-20

## 2020-09-20 DIAGNOSIS — R30.0 DYSURIA: ICD-10-CM

## 2020-09-20 DIAGNOSIS — F41.0 PANIC ANXIETY SYNDROME: ICD-10-CM

## 2020-09-22 RX ORDER — TAMSULOSIN HYDROCHLORIDE 0.4 MG/1
CAPSULE ORAL
Qty: 180 CAPSULE | Refills: 3 | Status: SHIPPED | OUTPATIENT
Start: 2020-09-22 | End: 2021-12-07

## 2020-09-22 RX ORDER — ESCITALOPRAM OXALATE 10 MG/1
TABLET ORAL
Qty: 90 TABLET | Refills: 3 | Status: SHIPPED | OUTPATIENT
Start: 2020-09-22 | End: 2021-12-07

## 2020-10-13 ENCOUNTER — COMMUNICATION - HEALTHEAST (OUTPATIENT)
Dept: INTERNAL MEDICINE | Facility: CLINIC | Age: 70
End: 2020-10-13

## 2020-10-13 DIAGNOSIS — E11.9 TYPE 2 DIABETES MELLITUS WITHOUT COMPLICATION, WITHOUT LONG-TERM CURRENT USE OF INSULIN (H): ICD-10-CM

## 2020-10-15 ENCOUNTER — RECORDS - HEALTHEAST (OUTPATIENT)
Dept: ADMINISTRATIVE | Facility: OTHER | Age: 70
End: 2020-10-15

## 2020-10-15 ENCOUNTER — AMBULATORY - HEALTHEAST (OUTPATIENT)
Dept: LAB | Facility: CLINIC | Age: 70
End: 2020-10-15

## 2020-10-15 DIAGNOSIS — E11.9 TYPE 2 DIABETES MELLITUS WITHOUT COMPLICATION, WITHOUT LONG-TERM CURRENT USE OF INSULIN (H): ICD-10-CM

## 2020-10-15 LAB
CREAT UR-MCNC: 153.9 MG/DL
HBA1C MFR BLD: 7.4 %
MICROALBUMIN UR-MCNC: 2.14 MG/DL (ref 0–1.99)
MICROALBUMIN/CREAT UR: 13.9 MG/G

## 2020-10-19 ENCOUNTER — COMMUNICATION - HEALTHEAST (OUTPATIENT)
Dept: INTERNAL MEDICINE | Facility: CLINIC | Age: 70
End: 2020-10-19

## 2020-10-27 ENCOUNTER — COMMUNICATION - HEALTHEAST (OUTPATIENT)
Dept: FAMILY MEDICINE | Facility: CLINIC | Age: 70
End: 2020-10-27

## 2020-11-02 ENCOUNTER — AMBULATORY - HEALTHEAST (OUTPATIENT)
Dept: NURSING | Facility: CLINIC | Age: 70
End: 2020-11-02

## 2020-11-24 ENCOUNTER — COMMUNICATION - HEALTHEAST (OUTPATIENT)
Dept: INTERNAL MEDICINE | Facility: CLINIC | Age: 70
End: 2020-11-24

## 2020-11-24 DIAGNOSIS — E11.9 TYPE 2 DIABETES MELLITUS WITHOUT COMPLICATION, WITHOUT LONG-TERM CURRENT USE OF INSULIN (H): ICD-10-CM

## 2020-11-25 RX ORDER — METFORMIN HCL 500 MG
1000 TABLET, EXTENDED RELEASE 24 HR ORAL
Qty: 180 TABLET | Refills: 3 | Status: SHIPPED | OUTPATIENT
Start: 2020-11-25 | End: 2021-12-07

## 2020-12-08 ENCOUNTER — RECORDS - HEALTHEAST (OUTPATIENT)
Dept: ADMINISTRATIVE | Facility: OTHER | Age: 70
End: 2020-12-08

## 2020-12-21 ENCOUNTER — RECORDS - HEALTHEAST (OUTPATIENT)
Dept: ADMINISTRATIVE | Facility: OTHER | Age: 70
End: 2020-12-21

## 2020-12-21 LAB — RETINOPATHY: NEGATIVE

## 2020-12-28 ENCOUNTER — COMMUNICATION - HEALTHEAST (OUTPATIENT)
Dept: PULMONOLOGY | Facility: OTHER | Age: 70
End: 2020-12-28

## 2021-01-04 ENCOUNTER — RECORDS - HEALTHEAST (OUTPATIENT)
Dept: HEALTH INFORMATION MANAGEMENT | Facility: CLINIC | Age: 71
End: 2021-01-04

## 2021-01-20 ENCOUNTER — COMMUNICATION - HEALTHEAST (OUTPATIENT)
Dept: INTERNAL MEDICINE | Facility: CLINIC | Age: 71
End: 2021-01-20

## 2021-02-18 ENCOUNTER — OFFICE VISIT - HEALTHEAST (OUTPATIENT)
Dept: INTERNAL MEDICINE | Facility: CLINIC | Age: 71
End: 2021-02-18

## 2021-02-18 DIAGNOSIS — I25.83 CORONARY ARTERY DISEASE DUE TO LIPID RICH PLAQUE: ICD-10-CM

## 2021-02-18 DIAGNOSIS — F41.9 ANXIETY: ICD-10-CM

## 2021-02-18 DIAGNOSIS — I25.10 CORONARY ARTERY DISEASE DUE TO LIPID RICH PLAQUE: ICD-10-CM

## 2021-02-18 DIAGNOSIS — G47.33 OBSTRUCTIVE SLEEP APNEA SYNDROME: ICD-10-CM

## 2021-02-18 DIAGNOSIS — E66.01 MORBID (SEVERE) OBESITY DUE TO EXCESS CALORIES (H): ICD-10-CM

## 2021-02-18 DIAGNOSIS — E11.9 TYPE 2 DIABETES MELLITUS WITHOUT COMPLICATION, WITHOUT LONG-TERM CURRENT USE OF INSULIN (H): ICD-10-CM

## 2021-02-18 LAB
ATRIAL RATE - MUSE: 87 BPM
BASOPHILS # BLD AUTO: 0.1 THOU/UL (ref 0–0.2)
BASOPHILS NFR BLD AUTO: 1 % (ref 0–2)
DIASTOLIC BLOOD PRESSURE - MUSE: NORMAL
EOSINOPHIL # BLD AUTO: 0.3 THOU/UL (ref 0–0.4)
EOSINOPHIL NFR BLD AUTO: 3 % (ref 0–6)
ERYTHROCYTE [DISTWIDTH] IN BLOOD BY AUTOMATED COUNT: 12.9 % (ref 11–14.5)
HBA1C MFR BLD: 7.2 %
HCT VFR BLD AUTO: 41.3 % (ref 40–54)
HGB BLD-MCNC: 13.7 G/DL (ref 14–18)
IMM GRANULOCYTES # BLD: 0.1 THOU/UL
IMM GRANULOCYTES NFR BLD: 1 %
INTERPRETATION ECG - MUSE: NORMAL
LYMPHOCYTES # BLD AUTO: 2 THOU/UL (ref 0.8–4.4)
LYMPHOCYTES NFR BLD AUTO: 19 % (ref 20–40)
MCH RBC QN AUTO: 28.7 PG (ref 27–34)
MCHC RBC AUTO-ENTMCNC: 33.2 G/DL (ref 32–36)
MCV RBC AUTO: 86 FL (ref 80–100)
MONOCYTES # BLD AUTO: 0.8 THOU/UL (ref 0–0.9)
MONOCYTES NFR BLD AUTO: 8 % (ref 2–10)
NEUTROPHILS # BLD AUTO: 7.1 THOU/UL (ref 2–7.7)
NEUTROPHILS NFR BLD AUTO: 68 % (ref 50–70)
P AXIS - MUSE: 34 DEGREES
PLATELET # BLD AUTO: 312 THOU/UL (ref 140–440)
PMV BLD AUTO: 9.5 FL (ref 7–10)
PR INTERVAL - MUSE: 174 MS
QRS DURATION - MUSE: 80 MS
QT - MUSE: 366 MS
QTC - MUSE: 440 MS
R AXIS - MUSE: 55 DEGREES
RBC # BLD AUTO: 4.78 MILL/UL (ref 4.4–6.2)
SYSTOLIC BLOOD PRESSURE - MUSE: NORMAL
T AXIS - MUSE: 21 DEGREES
VENTRICULAR RATE- MUSE: 87 BPM
WBC: 10.3 THOU/UL (ref 4–11)

## 2021-02-18 RX ORDER — LORAZEPAM 1 MG/1
TABLET ORAL
Qty: 60 TABLET | Refills: 2 | Status: SHIPPED | OUTPATIENT
Start: 2021-02-18 | End: 2022-06-06

## 2021-02-18 RX ORDER — EMPAGLIFLOZIN 10 MG/1
10 TABLET, FILM COATED ORAL DAILY
Qty: 30 TABLET | Refills: 5 | Status: SHIPPED | OUTPATIENT
Start: 2021-02-18 | End: 2022-06-06

## 2021-02-18 ASSESSMENT — MIFFLIN-ST. JEOR: SCORE: 1879.37

## 2021-03-16 ENCOUNTER — HOSPITAL ENCOUNTER (OUTPATIENT)
Dept: CARDIOLOGY | Facility: HOSPITAL | Age: 71
Discharge: HOME | End: 2021-03-16
Attending: INTERNAL MEDICINE

## 2021-03-16 DIAGNOSIS — I25.83 CORONARY ARTERY DISEASE DUE TO LIPID RICH PLAQUE: ICD-10-CM

## 2021-03-16 DIAGNOSIS — I25.10 CORONARY ARTERY DISEASE DUE TO LIPID RICH PLAQUE: ICD-10-CM

## 2021-03-16 LAB
AORTIC ROOT: 3.3 CM
AORTIC VALVE MEAN VELOCITY: 89.4 CM/S
ASCENDING AORTA: 3 CM
AV DIMENSIONLESS INDEX VTI: 0.8
AV MEAN GRADIENT: 4 MMHG
AV PEAK GRADIENT: 7 MMHG
AV VALVE AREA: 2.5 CM2
AV VELOCITY RATIO: 0.6
BSA FOR ECHO PROCEDURE: 2.35 M2
CV BLOOD PRESSURE: ABNORMAL MMHG
CV ECHO HEIGHT: 73 IN
CV ECHO WEIGHT: 236 LBS
DOP CALC AO PEAK VEL: 132 CM/S
DOP CALC AO VTI: 22.7 CM
DOP CALC LVOT AREA: 3.14 CM2
DOP CALC LVOT DIAMETER: 2 CM
DOP CALC LVOT PEAK VEL: 85 CM/S
DOP CALC LVOT STROKE VOLUME: 57.8 CM3
DOP CALCLVOT PEAK VEL VTI: 18.4 CM
ECHO EJECTION FRACTION ESTIMATED: 55 %
EJECTION FRACTION: 51 % (ref 55–75)
FRACTIONAL SHORTENING: 29 % (ref 28–44)
INTERVENTRICULAR SEPTUM IN END DIASTOLE: 1.5 CM (ref 0.6–1)
IVS/PW RATIO: 1.2
LA AREA 1: 18 CM2
LA AREA 2: 13.8 CM2
LEFT ATRIUM LENGTH: 5.61 CM
LEFT ATRIUM SIZE: 3.3 CM
LEFT ATRIUM TO AORTIC ROOT RATIO: 1 NO UNITS
LEFT ATRIUM VOLUME INDEX: 16 ML/M2
LEFT ATRIUM VOLUME: 37.6 ML
LEFT VENTRICLE CARDIAC INDEX: 1.8 L/MIN/M2
LEFT VENTRICLE CARDIAC OUTPUT: 4.2 L/MIN
LEFT VENTRICLE DIASTOLIC VOLUME INDEX: 31.9 CM3/M2 (ref 34–74)
LEFT VENTRICLE DIASTOLIC VOLUME: 75 CM3 (ref 62–150)
LEFT VENTRICLE HEART RATE: 73 BPM
LEFT VENTRICLE MASS INDEX: 62.4 G/M2
LEFT VENTRICLE SYSTOLIC VOLUME INDEX: 15.7 CM3/M2 (ref 11–31)
LEFT VENTRICLE SYSTOLIC VOLUME: 37 CM3 (ref 21–61)
LEFT VENTRICULAR INTERNAL DIMENSION IN DIASTOLE: 3.1 CM (ref 4.2–5.8)
LEFT VENTRICULAR INTERNAL DIMENSION IN SYSTOLE: 2.2 CM (ref 2.5–4)
LEFT VENTRICULAR MASS: 146.7 G
LEFT VENTRICULAR OUTFLOW TRACT MEAN GRADIENT: 2 MMHG
LEFT VENTRICULAR OUTFLOW TRACT MEAN VELOCITY: 59.3 CM/S
LEFT VENTRICULAR OUTFLOW TRACT PEAK GRADIENT: 3 MMHG
LEFT VENTRICULAR POSTERIOR WALL IN END DIASTOLE: 1.3 CM (ref 0.6–1)
LV STROKE VOLUME INDEX: 24.6 ML/M2
MITRAL VALVE E/A RATIO: 0.7
MV AVERAGE E/E' RATIO: 10.4 CM/S
MV DECELERATION TIME: 331 MS
MV E'TISSUE VEL-LAT: 7.31 CM/S
MV E'TISSUE VEL-MED: 5.26 CM/S
MV LATERAL E/E' RATIO: 9
MV MEDIAL E/E' RATIO: 12.5
MV PEAK A VELOCITY: 91.3 CM/S
MV PEAK E VELOCITY: 65.6 CM/S
NUC REST DIASTOLIC VOLUME INDEX: 3776 LBS
NUC REST SYSTOLIC VOLUME INDEX: 73 IN
TRICUSPID VALVE ANULAR PLANE SYSTOLIC EXCURSION: 2.5 CM

## 2021-03-16 ASSESSMENT — MIFFLIN-ST. JEOR: SCORE: 1879.37

## 2021-03-17 ENCOUNTER — COMMUNICATION - HEALTHEAST (OUTPATIENT)
Dept: INTERNAL MEDICINE | Facility: CLINIC | Age: 71
End: 2021-03-17

## 2021-03-18 ENCOUNTER — COMMUNICATION - HEALTHEAST (OUTPATIENT)
Dept: INTERNAL MEDICINE | Facility: CLINIC | Age: 71
End: 2021-03-18

## 2021-03-18 DIAGNOSIS — R06.00 DYSPNEA, UNSPECIFIED TYPE: ICD-10-CM

## 2021-03-18 RX ORDER — MIRABEGRON 25 MG/1
25 TABLET, FILM COATED, EXTENDED RELEASE ORAL DAILY
Status: SHIPPED | COMMUNITY
Start: 2021-03-02 | End: 2023-10-12

## 2021-03-21 ENCOUNTER — COMMUNICATION - HEALTHEAST (OUTPATIENT)
Dept: INTERNAL MEDICINE | Facility: CLINIC | Age: 71
End: 2021-03-21

## 2021-03-21 DIAGNOSIS — E78.5 DYSLIPIDEMIA, GOAL LDL BELOW 70: ICD-10-CM

## 2021-03-22 RX ORDER — ATORVASTATIN CALCIUM 80 MG/1
80 TABLET, FILM COATED ORAL DAILY
Qty: 90 TABLET | Refills: 2 | Status: SHIPPED | OUTPATIENT
Start: 2021-03-22 | End: 2022-01-20

## 2021-03-23 ENCOUNTER — COMMUNICATION - HEALTHEAST (OUTPATIENT)
Dept: CARDIOLOGY | Facility: CLINIC | Age: 71
End: 2021-03-23

## 2021-03-24 ENCOUNTER — OFFICE VISIT - HEALTHEAST (OUTPATIENT)
Dept: CARDIOLOGY | Facility: CLINIC | Age: 71
End: 2021-03-24

## 2021-03-24 DIAGNOSIS — I10 ESSENTIAL HYPERTENSION: ICD-10-CM

## 2021-03-24 DIAGNOSIS — E78.5 DYSLIPIDEMIA, GOAL LDL BELOW 70: ICD-10-CM

## 2021-03-24 DIAGNOSIS — I25.83 CORONARY ARTERY DISEASE DUE TO LIPID RICH PLAQUE: ICD-10-CM

## 2021-03-24 DIAGNOSIS — R06.09 DYSPNEA ON EXERTION: ICD-10-CM

## 2021-03-24 DIAGNOSIS — I25.10 CORONARY ARTERY DISEASE DUE TO LIPID RICH PLAQUE: ICD-10-CM

## 2021-03-24 RX ORDER — CHLORTHALIDONE 25 MG/1
12.5 TABLET ORAL DAILY
Qty: 45 TABLET | Refills: 3 | Status: SHIPPED | OUTPATIENT
Start: 2021-03-24 | End: 2022-01-20

## 2021-03-25 LAB — BNP SERPL-MCNC: 24 PG/ML (ref 0–69)

## 2021-03-26 ENCOUNTER — HOSPITAL ENCOUNTER (OUTPATIENT)
Dept: NUCLEAR MEDICINE | Facility: HOSPITAL | Age: 71
Discharge: HOME | End: 2021-03-26
Attending: INTERNAL MEDICINE

## 2021-03-26 ENCOUNTER — HOSPITAL ENCOUNTER (OUTPATIENT)
Dept: CARDIOLOGY | Facility: HOSPITAL | Age: 71
Discharge: HOME | End: 2021-03-26
Attending: INTERNAL MEDICINE

## 2021-03-26 DIAGNOSIS — I25.83 CORONARY ARTERY DISEASE DUE TO LIPID RICH PLAQUE: ICD-10-CM

## 2021-03-26 DIAGNOSIS — R06.09 DYSPNEA ON EXERTION: ICD-10-CM

## 2021-03-26 DIAGNOSIS — I25.10 CORONARY ARTERY DISEASE DUE TO LIPID RICH PLAQUE: ICD-10-CM

## 2021-03-26 LAB
CV STRESS CURRENT BP HE: NORMAL
CV STRESS CURRENT HR HE: 103
CV STRESS CURRENT HR HE: 112
CV STRESS CURRENT HR HE: 116
CV STRESS CURRENT HR HE: 121
CV STRESS CURRENT HR HE: 126
CV STRESS CURRENT HR HE: 128
CV STRESS CURRENT HR HE: 135
CV STRESS CURRENT HR HE: 136
CV STRESS CURRENT HR HE: 139
CV STRESS CURRENT HR HE: 141
CV STRESS CURRENT HR HE: 142
CV STRESS CURRENT HR HE: 143
CV STRESS CURRENT HR HE: 80
CV STRESS CURRENT HR HE: 82
CV STRESS CURRENT HR HE: 82
CV STRESS CURRENT HR HE: 83
CV STRESS CURRENT HR HE: 84
CV STRESS CURRENT HR HE: 85
CV STRESS CURRENT HR HE: 90
CV STRESS CURRENT HR HE: 91
CV STRESS CURRENT HR HE: 96
CV STRESS DEVIATION TIME HE: NORMAL
CV STRESS ECHO PERCENT HR HE: NORMAL
CV STRESS EXERCISE STAGE HE: NORMAL
CV STRESS EXERCISE STAGE REACHED HE: NORMAL
CV STRESS FINAL RESTING BP HE: NORMAL
CV STRESS FINAL RESTING HR HE: 85
CV STRESS MAX HR HE: 143
CV STRESS MAX TREADMILL GRADE HE: 12
CV STRESS MAX TREADMILL SPEED HE: 2.5
CV STRESS PEAK DIA BP HE: NORMAL
CV STRESS PEAK SYS BP HE: NORMAL
CV STRESS PHASE HE: NORMAL
CV STRESS PROTOCOL HE: NORMAL
CV STRESS RESTING PT POSITION HE: NORMAL
CV STRESS RESTING PT POSITION HE: NORMAL
CV STRESS ST DEVIATION AMOUNT HE: NORMAL
CV STRESS ST DEVIATION ELEVATION HE: NORMAL
CV STRESS ST EVELATION AMOUNT HE: NORMAL
CV STRESS TEST TYPE HE: NORMAL
CV STRESS TOTAL STAGE TIME MIN 1 HE: NORMAL
RATE PRESSURE PRODUCT: NORMAL
STRESS ECHO BASELINE DIASTOLIC HE: 62
STRESS ECHO BASELINE HR: 102
STRESS ECHO BASELINE SYSTOLIC BP: 122
STRESS ECHO CALCULATED PERCENT HR: 96 %
STRESS ECHO LAST STRESS DIASTOLIC BP: 70
STRESS ECHO LAST STRESS HR: 142
STRESS ECHO LAST STRESS SYSTOLIC BP: 136
STRESS ECHO POST ESTIMATED WORKLOAD: 5.8
STRESS ECHO POST EXERCISE DUR MIN: 4
STRESS ECHO POST EXERCISE DUR SEC: 0
STRESS ECHO TARGET HR: 149

## 2021-04-22 ENCOUNTER — COMMUNICATION - HEALTHEAST (OUTPATIENT)
Dept: CARDIOLOGY | Facility: CLINIC | Age: 71
End: 2021-04-22

## 2021-04-22 ENCOUNTER — AMBULATORY - HEALTHEAST (OUTPATIENT)
Dept: PULMONOLOGY | Facility: OTHER | Age: 71
End: 2021-04-22

## 2021-04-22 DIAGNOSIS — I25.10 CORONARY ARTERY DISEASE INVOLVING NATIVE CORONARY ARTERY OF NATIVE HEART WITHOUT ANGINA PECTORIS: ICD-10-CM

## 2021-04-22 DIAGNOSIS — J45.41 MODERATE PERSISTENT ASTHMA WITH EXACERBATION: ICD-10-CM

## 2021-04-22 RX ORDER — CLOPIDOGREL BISULFATE 75 MG/1
75 TABLET ORAL DAILY
Qty: 90 TABLET | Refills: 1 | Status: SHIPPED | OUTPATIENT
Start: 2021-04-22 | End: 2021-08-12

## 2021-05-27 NOTE — PROGRESS NOTES
ASSESSMENT AND PLAN:        2. Acute chest pain  This has been chronic.  There is suggestion of relation to exertion. His NTG have been old and cause no symptoms.  Not progressing in severity.  Had CTA coronaries that was negative for significant occlusion.  NM stress test 2016 was negative.  He will be seeing cardiology in follow up.  I recommend repeating NM stress test, will schedule.  He will use fresh NTG prn and avoid any extremes of exertion.    - nitroglycerin (NITROSTAT) 0.3 MG SL tablet; Place 1 tablet (0.3 mg total) under the tongue every 5 (five) minutes as needed.  Dispense: 1 Bottle; Refill: 5  - NM Pharmacologic Stress Test; Future    3. Panic anxiety syndrome  Suspect this is the cause of his noctural palpitations and even the chest symptoms.  He agrees to trial of SSRI treatment.  Will continue prn lorazepam.  He signed CSA and will get urine drug screen.   - escitalopram oxalate (LEXAPRO) 10 MG tablet; Take 1 tablet (10 mg total) by mouth daily.  Dispense: 30 tablet; Refill: 2  - Drugs of Abuse 1,Urine    4. Mild intermittent asthma without complication  Being treated by pulmonary, continue albuterol and LABA rx.       This visit was for a total of 40 minutes face to face with the patient. Over 50% of this time was spent in care coordination, counseling and educating the patient about about the nature of chest pain, meaning of stress test and CTA coronary testing, the use of NTG, when to consider going to ER, treating anxiety with SSRI.    Patient Instructions   1. Use fresh NTG as needed for exertional chest pain and note response.     2. Schedule NM stress test    3. Follow up as scheduled with cardiology.     4. Start escitalopram 10 mg daily     5. Follow up 6 weeks.      6. Avoid extremes of exertion.    7. An severe pain that does not respond to NTG and feels significant or has associated breathing symptoms - call paramedics for ER evaluation    CHIEF COMPLAINT:  Chief Complaint   Patient  "presents with     Chest Pain     this morning     HISTORY OF PRESENT ILLNESS:  Nadir Cantu is a 69 y.o. male with continued symptoms of chest discomfort and with palpitations at night time. The chest discomfort is a fullness sensation and does seem at times with exertion although not always.  Using an old NTG without benefit.  No unusual dyspnea, or cough, or arm radiation of the pain.  No diaphoresis, or nausea with the chest symptoms.  Has also been having nocturnal palpitations and an element of anxiety in the night.  He does use the prn lorazepam for anxious feelings.  No harsh episodes of panic.      REVIEW OF SYSTEMS:   See HPI, all other systems on review are negative.  Past Medical History:   Diagnosis Date     Anxiety      Asthma      Chronic back pain      Diabetes mellitus (H)     \"pre-DM\"     Hearing loss      Hyperlipidemia      Hypertension      Obesity      STEVE (obstructive sleep apnea)      Peripheral vascular disease (H)      Reflux esophagitis      Salmonella dysentery      Social History     Tobacco Use   Smoking Status Former Smoker     Types: Cigarettes     Last attempt to quit: 1969     Years since quittin.9   Smokeless Tobacco Never Used     Family History   Problem Relation Age of Onset     Heart disease Father      Heart disease Sister      Past Surgical History:   Procedure Laterality Date     CAROTID ENDARTERECTOMY  2013     CATARACT OS  2014     CA APPENDECTOMY      Description: Appendectomy;  Recorded: 2009;     CA BIOPSY OF SKIN LESION      Description: Biopsy Skin;  Recorded: 2009;     CA LAP,CHOLECYSTECTOMY      Description: Cholecystectomy Laparoscopic;  Recorded: 2011;     VITALS:  Vitals:    19 1512   BP: 118/80   Patient Site: Left Arm   Patient Position: Sitting   Cuff Size: Adult Large   Pulse: 87   SpO2: 98%   Weight: (!) 248 lb (112.5 kg)   Height: 6' 0.25\" (1.835 m)     Wt Readings from Last 3 Encounters:   19 (!) 248 lb " (112.5 kg)   02/06/19 (!) 255 lb (115.7 kg)   11/23/18 (!) 246 lb (111.6 kg)     PHYSICAL EXAM:  Constitutional:  In NAD, alert and oriented  Cardiac:  S1 S2 without murmur  Lungs: Clear to auscultation  Abdomen:   Soft, flat and non-tender, without guarding, rebound, or mass.      DECISION TO OBTAIN OLD RECORDS AND/OR OBTAIN HISTORY FROM SOMEONE OTHER THAN PATIENT, AND/OR ACCESSING CARE EVERYWHERE):  1  0     REVIEW AND SUMMARIZATION OF OLD RECORDS, AND/OR OBTAINING HISTORY FROM SOMEONE OTHER THAN PATIENT, AND/OR DISCUSSION OF CASE WITH ANOTHER HEALTH CARE PROVIDER:  2 reviewed pulmonary and cardiology assessments.     REVIEW AND/OR ORDER OF OF CLINICAL LAB TESTS: 1  Reviewed 11/18 lab results.    REVIEW AND/OR ORDER OF RADIOLOGY TESTS: 1 reviewed coronary CTA.    REVIEW AND/OR ORDER OF MEDICAL TESTS (EKG/ECHO/COLONOSCOPY/EGD): 1  Reviewed PFT results    INDEPENDENT  VISUALIZATION OF IMAGE, TRACING, OR SPECIMEN ITSELF (2 EACH):  0     TOTAL: 5    Current Outpatient Medications   Medication Sig Dispense Refill     albuterol (PROAIR HFA;PROVENTIL HFA;VENTOLIN HFA) 90 mcg/actuation inhaler Inhale 2 puffs every 6 (six) hours as needed for wheezing or shortness of breath. 1 Inhaler 12     aluminum hydrox-magnesium carb 160-105 mg Chew Chew.       aluminum-magnesium hydroxide 200-200 mg/5 mL suspension Take 30 mL by mouth 4 (four) times a day as needed for indigestion.       aspirin (ASPIRIN LOW DOSE) 81 MG EC tablet Take by mouth 2 (two) times a day.        atorvastatin (LIPITOR) 40 MG tablet Take 1 tablet (40 mg total) by mouth daily. 90 tablet 3     calcium carbonate-simethicone (GAS-X WITH MAALOX) 500-125 mg Chew Chew 1 tablet daily.       calcium, as carbonate, (TUMS) 200 mg calcium (500 mg) chewable tablet Chew 1 tablet daily. Patient takes 4500 mg daily.       fluticasone (FLONASE) 50 mcg/actuation nasal spray INHALE 1 TO 2 SPRAYS INTO EACH NOSTRIL ONCE DAILY 48 g 2     fluticasone furoate (ARNUITY ELLIPTA) 200  mcg/actuation DsDv Inhale 1 puff daily. 30 each 12     lansoprazole (PREVACID) 30 MG capsule Take 2 capsules (60 mg total) by mouth daily. 30 capsule 11     LORazepam (ATIVAN) 1 MG tablet TAKE 1 TABLET (1 MG TOTAL) BY MOUTH AS NEEDED FOR ANXIETY. 60 tablet 2     losartan (COZAAR) 50 MG tablet Take 1.5 tablets daily 45 tablet 9     methylcellulose (CITRUCEL) 500 mg Tab Take 500 mg by mouth daily. Take 6 tablets by mouth daily.       metoclopramide (REGLAN) 10 MG tablet Take by mouth.       metroNIDAZOLE (METROGEL) 1 % gel Apply 1 application topically daily. Apply sparingly to affected area(s) once daily.       nitroglycerin (NITROSTAT) 0.3 MG SL tablet Place 1 tablet (0.3 mg total) under the tongue every 5 (five) minutes as needed. 1 Bottle 5     ranitidine (ZANTAC) 150 MG tablet Take 1 tablet (150 mg total) by mouth 2 (two) times a day. 60 tablet 9     simethicone (GAS-X) 80 MG chewable tablet Chew.       escitalopram oxalate (LEXAPRO) 10 MG tablet Take 1 tablet (10 mg total) by mouth daily. 30 tablet 2     No current facility-administered medications for this visit.      Adolfo Kraus MD  Internal Medicine  [facility]

## 2021-05-27 NOTE — TELEPHONE ENCOUNTER
Medication Question or Clarification  Who is calling: Pharmacy: [facility]  What medication are you calling about? (include dose and sig) nitroglycerin (NITROSTAT) 0.3 MG SL tablet  Who prescribed the medication?: Dr. Kraus  What is your question/concern?: Pharmacy said that they received a prescription for 0.3 mg but usually this medication is dosed at 0.4 mg.   Pharmacy: [facility]  Okay to leave a detailed message?: Yes  Site CMT - Please call the pharmacy to obtain any additional needed information.

## 2021-05-27 NOTE — TELEPHONE ENCOUNTER
Who is calling:  [facility] cardiology   Reason for Call:  abnormal stress test   Date of last appointment with primary care: 04/03/19  Okay to leave a detailed message: No

## 2021-05-27 NOTE — TELEPHONE ENCOUNTER
Phone call to [facility] - informed nurse that phone teach was not completed - nurse noted patient was presently in [place] ED for CP.    Procedure order set placed per cosign required protocol.  mg

## 2021-05-27 NOTE — TELEPHONE ENCOUNTER
"3rd unsuccessful attempt to contact patient - per Dr. Claire's 4-18-19 visit note \"He will be scheduled for coronary angiography with possible intervention tomorrow.  N.p.o. past midnight tonight and plan for coronary geography possible intervention tomorrow.  He understands the risks and benefits of the procedure and agrees to proceed.  This will be done at [facility].\"  mg  "

## 2021-05-27 NOTE — PATIENT INSTRUCTIONS - HE
1. Use fresh NTG as needed for exertional chest pain and note response.     2. Schedule NM stress test    3. Follow up as scheduled with cardiology.     4. Start escitalopram 10 mg daily     5. Follow up 6 weeks.      6. Avoid extremes of exertion.    7. An severe pain that does not respond to NTG and feels significant or has associated breathing symptoms - call paramedics for ER evaluation

## 2021-05-27 NOTE — TELEPHONE ENCOUNTER
I spoke with him about the stress test.  It is positive, and different from before.  He says he had 'about a 2' discomfort during the stress test.  Has had a recent URI.  He doesn't feel that SL NTG helps 'much', but possibly some.  No prolonged episodes, or increase in severity or frequency.  Has cardiology appointment in 3 days.  He will avoid significant exertion until seen by them about further evaluation.  He knows to try NTG for symptoms that persist and to go to the ER for persistent symptoms or symptoms increasing in severity.  Dr. Nayana MD

## 2021-05-27 NOTE — TELEPHONE ENCOUNTER
I spoke with him.  He also contacted Dr. Tamez. He says he gets very mild tightness that clears quickly when he goes up the stairs.  No rest symptoms, or severe symptoms. Again, the advice is to go to ER if any symptoms are prolonged, fail to response quickly to rest and/or NTG and prn if anxious about this. Otherwise, Dr. Tamez, and I are comfortable waiting until the appointment in 2 days. I explained that he seems to have angina, and may need an angiogram and/or stent placement.      Dr. Nayana MD

## 2021-05-27 NOTE — TELEPHONE ENCOUNTER
----- Message from Sri Kessler sent at 4/18/2019  3:36 PM CDT -----  Regarding: [facility] Ordering  CORS, poss PCI with Dr Anna/Gianluca - 4/19, 6am admit  H&P - 4/18    Thanks!

## 2021-05-28 ASSESSMENT — ASTHMA QUESTIONNAIRES
ACT_TOTALSCORE: 11
ACT_TOTALSCORE: 20
ACT_TOTALSCORE: 9
ACT_TOTALSCORE: 20

## 2021-05-28 NOTE — PROGRESS NOTES
ITP ASSESSMENT   Assessment Day: Initial    Session Number: 1-2  Precautions: Standard    Diagnosis: Stent    Risk Stratification: High    Referring Provider: Tonny Anna MD  EXERCISE  Exercise Assessment: Initial       6 Minute Walk Test   Pre   Pre Exercise HR: 68                    Pre Exercise BP: 119/75      Peak  Peak HR: 111                   Peak BP: 106/65    Peak feet: 1200    Peak O2 SAT: 98    Peak RPE: 13    Peak MPH: 2.27      Symptoms:  Peak Symptoms: Hip and Back       5 mins. Post  5 Min Post HR: 70    5 Min Post BP: 124/70                           Exercise Plan  Goals Next 30 days  ADL'S: Return to gardening with wife    Leisure: Return to water exercises at the [facility]    Work: Return to PT work     Education Goals: Medication review    Education Goals Met: Patient can state cardiac s/s and appropriate emergency response.;Has system for taking medication.      Exercise Prescription  Exercise Mode: Treadmill;Nustep;Bike;Arm Erg.;Hallway Walking    Frequency: 2x/week    Duration: 20-30 minutes    Intensity / THR: 20-30 beats above resting heart rate    RPE 11-14  Progression / Met level: 2.2-3.0    Resistive Training?: Yes      Current Exercise (mins/week): 1      Interventions  Home Exercise:  Mode: Pool/Walk    Frequency: 2x/week    Duration: 10-15 minutes      Education Material : Educational videos;Provide written material;Individual education and counseling;Offer educational classes      Education Completed  Exercise Education Completed: Cardiac Anatomy;Signs and Symptoms;RPE;Emergency Plan;Home Exercise;Warm up/cool down;FITT Principles;BP/HR Reponse to exercise;Benefits of Exercise;End point of exercise              Exercise Follow-up/Discharge  Follow up/Discharge: Encouraged pt to start doing small bouts of exercise at home as tolerates with back pain.    NUTRITION  Nutrition Assessment: Initial      Nutrition Risk Factors:  Nutrition Risk Factors:  "Diabetes;Dyslipidemia;Overweight  HbA1c: 6.3 (Per patient)  Monitors blood sugar at home: No (Diet Controlled )  Frequency: NA  Cholesterol: 140  LDL: 80  HDL: 34  Triglycerides: 169      Nutrition Plan  Interventions  Other Nutrition Intervention: Diet Class;Therapist/Pt Discussion;Educational Videos;Provide with Written Material    Education Completed  Nutrition Education Completed: Risk factor overview      Goals  Nutrition Goals (Next 30 days): Patient can identify their risk factors for CAD;Patient will follow a low sodium diet;Patient able to demonstrate carbohydrate counting;Patient will follow a low saturated fat diet;Patient will lose weight;Improve Rate Your Plate Survey Score      Goals Met  Nutrition Goals Met: Provided Rate your Plate Survey;Reviewed Dietitian schedule      Height, Weight, and  BMI  Weight: 245 lb (111.1 kg)  Height: 6' 1\" (1.854 m)  BMI: 32.33      Nutrition Follow-up  Follow-up/Discharge: Encouraged pt to meet with dietitian and attend education classes.          Other Risk Factors  Other Risk Factor Assessment: Initial      HTN Risk Factor: Hypertension      Pre Exercise BP: 119/75  Post Exercise BP: 124/70      Hypertension Plan  Goals  HTN Goals: Follow low sodium diet;Take medication as prescribed;Exercises regularly      Goals Met  HTN Goals Met: Take medication as prescribed      HTN Interventions  HTN Interventions: Diet consult;Therapist/patient discussion;Provide written material;Offer educational videos;Offer educational classes      HTN Education Completed  HTN Education Completed: Risk factor overview      Tobacco Risk Factor: NA    Risk Factor Follow-up   Follow-up/Discharge: Encouraged pt to attend education classes.      PSYCHOSOCIAL  Psychosocial Assessment: Initial       [facility] Q of L Summary Score: 19      IRENE-D Score: 13      Psychosocial Risk Factor: Stress      Psychosocial Plan  Interventions  If IRENE-D > 15 send letter to MD  Interventions: Offer Social " Service consult;Offer Spiritual Care consult;Offer educational videos and classes;Provide written material;Offer Healing Touch referral      Education Completed  Education Completed: S/S of depression;Effects of stress on body      Goals  Goals (Next 30 days): Improvement in [employer] COOP score;Practicing stress management skills      Goals Met  Goals Met: Identified Support system;Oriented to stress management classes;Identify stressors      Psychosocial Follow-up  Follow-up/Discharge: Encouraged pt to attend stress management class.              Patient involved in Goal setting?: Yes      Signature: _____________________________________________________________    Date: __________________    Time: __________________See Doc Flowsheet

## 2021-05-28 NOTE — TELEPHONE ENCOUNTER
"Pt reports he had two stents put in two weeks ago. Has had follow up with PCP and Cardiac Rehab. BP has been 120's/60's approximately since discharge. BP today 158/80 a few minutes ago. Pt reports feeling lightheaded and dizzy intermittently for several days at least \"more so today\". Pt has not missed any medications. Discharged from hospital with two new BP medications per pt. Pt reports dizziness and lightheadedness when looking up but able to walk steady.     Writer reassured pt BP not excessively high. Writer advised pt protocol is to be seen in clinic within three days however message can be routed for PCP advice. Reviewed signs and symptoms that require immediate attention with pt and advised pt to call back with any worsening of condition.     Pt verbalizes understanding and agrees to plan.     Reason for Disposition    [1] Taking BP medications AND [2] feels is having side effects (e.g., impotence, cough, dizzy upon standing)    Protocols used: HIGH BLOOD PRESSURE-A-AH      "

## 2021-05-28 NOTE — PROGRESS NOTES
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      Chronic back pain      Coronary artery disease due to lipid rich plaque 4/19/2019     Diabetes mellitus (H)     \"pre-DM\"     Dyslipidemia, goal LDL below 70      Essential hypertension      Hearing loss      Left Rotator Cuff Tendonitis      Obesity      STEVE (obstructive sleep apnea)      Peripheral vascular disease (H)      Reflux esophagitis      Salmonella dysentery 2014     Vitamin D deficiency        Physical Assessment  Precautions/ Physical Limitations: Chronic back pain  Oxygen: No  O2 Sats: 98 Lung Sounds: Clear Edema: None  Incisions: NA  Sleeping Pattern: fair   Appetite: fair   Nutrition Risk Screen: Sent home survey with pt      Psychosocial/ Emotional Health  1. In the past 12 months, have you been in a relationship where you have been abused physically, emotionally, sexually or financially? No  notified: NA  2. Who do you turn to for emotional support?: Wife  3. Do you have cultural or spiritual needs? No  4. Have there been any major life changes in the past 12 months? No    Referral Information  Primary Physician: Adolfo Kraus MD  Cardiologist: Dakotah  Surgeon: Shoshana Herndon exercise/Equipment: Has gym membership at the [facility] and enjoys using the pool.     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 Living Accommodations: [facility] Steps: Yes      Support people at home: Yes   2. Marital Status:   3. Family is able to assist with cares      [employer]/Community involvement: Yes  4. Recreation/Hobbies: Gardening, traveling, walking " dog        See Doc Flowsheet

## 2021-05-28 NOTE — PATIENT INSTRUCTIONS - HE
1.  We discussed CAD and stents    2.  We discussed a healthy low carbohydrate diet    3.  We discussed healthy exercise and weight loss.      4. Follow up in 4 weeks.

## 2021-05-28 NOTE — PATIENT INSTRUCTIONS - HE
Nadir Cantu,    It was a pleasure to see you today at the [facility].     My recommendations after this visit include:  - Decrease losartan to 50 mg daily.    - Call sleep medicine to follow up     Carol Jack CNP      Medication     o Take all your medications as prescribed  o Do not stop any medications without talking with a healthcare provider    Exercise      o Physical activity is important for overall health  o Set a goal of 150 minutes of exercise each week  o For example, 30 minutes of exercise 5 days each week.    o These 30 minutes can be broken into shorter periods of 15 minutes twice daily or 10 minutes three times daily  o Start any exercise program slowly and work towards the goal of 150 minutes each week  o For example, you may start with 10 minutes and plan to add a few minutes each week as you get stronger   o Examples of exercise include walking, swimming, or biking  o Remember to stretch and stay hydrated with exercise    Diet     o A heart healthy diet includes:  o A variety of fruits and vegetables  o Whole grains  o Low-fat dairy (fat-free, 1% fat, and low-fat)  o Lean meats and poultry without skin   o Fish (eat fish 2 times each week)  o Nuts  o Limit saturated fat to about 13 grams each day (based on a 2000 calorie diet)  o Limit red meat  o Limit sugars (sweets and sugary beverages)  o Limit your portion sizes  o Do not add salt to your food when cooking or at the table  o Limit alcohol intake (no more than 1 drink each day for women or 2 drinks each day for men)    Weight Loss     o Work on losing weight with diet and exercise  o You BMI (body mass index) should be between 18.5-24.9  o This is a calculation of your weight and height  o Please ask your healthcare provider for your BMI    Manage Other Chronic Health Conditions     o Control cholesterol  o Eat a diet low in saturated fat  o Exercise   o Take a statin medication as prescribed  o Manage blood  pressure  o Eat a diet low in sodium  o Exercise  o Reduce stress  o Lose weight   o Take blood pressure medications as prescribed  o Control blood sugars if diabetic  o Monitor sugars and carbohydrates in your diet  o Lose weight   o Take diabetes medications as prescribed  o Follow-up with your primary care provider to make sure your blood sugars are well controlled    Stress Reduction     o Find time each day to relax  o Reading, listening to music, yoga, meditation, exercise, spending time with friends and family, volunteering   o Get 6-8 hours of sleep each night    Smoking Cessation     o Smoking causes numerous health problems including coronary artery disease  o It is never too late to quit  o Set realistic goals for quitting  o Decrease the number of cigarettes used each week  o Use nicotine gum or patches to help you quit    Information from the American Heart Association.  Please visit their website at www.heart.org

## 2021-05-28 NOTE — PROGRESS NOTES
Order received for Phase II Cardiac Rehab. Patient's preferred location is [facility] in [place].  SHASHANK and order faxed.

## 2021-05-28 NOTE — TELEPHONE ENCOUNTER
Patient Returning Call  Reason for call:   Patient calling back  Information relayed to patient:  Please tell him to decrease the losartan medication to 1/2 tablet daily and see how symptoms go.  Dr. Nayana MD   Patient has additional questions:  No  If YES, what are your questions/concerns:   none  Okay to leave a detailed message?: No call back needed

## 2021-05-28 NOTE — TELEPHONE ENCOUNTER
Please tell him to decrease the losartan medication to 1/2 tablet daily and see how symptoms go.  Dr. Nayana MD

## 2021-05-28 NOTE — PROGRESS NOTES
ASSESSMENT AND PLAN:    1. Type 2 diabetes mellitus   Last A1c was 6.3, 11/18.  Weight loss is key to his treatment, this was discussed.  Assess control in one month.     2. Essential hypertension  Controlled with with losartan, and metoprolol, no changes recommendd  - losartan (COZAAR) 50 MG tablet; Take 1.5 tablets (75 mg total) by mouth daily. Take 1.5 tablets daily    3. Chronic Reflux Esophagitis  Managed with current medication, lansoprazole with ranitidine, will improve with weight loss.   - ranitidine (ZANTAC) 150 MG tablet; Take 1 tablet (150 mg total) by mouth 2 (two) times a day as needed for heartburn.    4. CAD, s/p stents and angioplasty  He feels better, post stents.  No complications occurred.  There was talk of possibly requiring CABS, but then stenting was successful.   On Brilinta.  Won't need colonoscopy for a few years.  Continue aspirin.  Has cardiology follow up scheduled.  We discussed gradual increase in activity, avoiding extremes of activity, monitoring for recurrent symptoms of angina, which were atypical for him, and mostly CATHERINE.  He notes that during the procedure there was right upper chest and arm symptoms as well that could be his kind of angina, should it recur, particularly with exertion.     All his medications were reviewed with him.     Patient Instructions   1.  We discussed CAD and stents    2.  We discussed a healthy low carbohydrate diet    3.  We discussed healthy exercise and weight loss.      4. Follow up in 4 weeks.      CHIEF COMPLAINT:  Chief Complaint   Patient presents with     Follow-up     stents/ heart     HISTORY OF PRESENT ILLNESS:  Nadir Cantu is a 69 y.o. male presents 11 days post coronary angiography and angioplasty with stent placement.  He did not have post procedure complications.  He has noted less sense of pressure in his chest, more energy and less CATHERINE.  He never really had significant chest pain, some vague chest pressure.  He has questions  "about healthy diet, activity, and long term outlook for his CAD.      REVIEW OF SYSTEMS:   See HPI, all other systems on review are negative.  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      Chronic back pain      Coronary artery disease due to lipid rich plaque 2019     Diabetes mellitus (H)     \"pre-DM\"     Dyslipidemia, goal LDL below 70      Essential hypertension      Hearing loss      Left Rotator Cuff Tendonitis      Obesity      STEVE (obstructive sleep apnea)      Peripheral vascular disease (H)      Reflux esophagitis      Salmonella dysentery      Vitamin D deficiency      Social History     Tobacco Use   Smoking Status Former Smoker     Types: Cigarettes     Last attempt to quit: 1969     Years since quittin.0   Smokeless Tobacco Never Used     Family History   Problem Relation Age of Onset     Heart disease Father      Heart disease Sister      Past Surgical History:   Procedure Laterality Date     APPENDECTOMY       CAROTID ENDARTERECTOMY       CATARACT OS  2014     CV CORONARY ANGIOGRAM N/A 2019    Procedure: Coronary Angiogram;  Surgeon: Tonny Anna MD;  Location: [facility];  Service: Cardiology     CV LEFT HEART CATHETERIZATION WO LEFT VETRICULOGRAM Left 2019    Procedure: Left Heart Catheterization Without Left Ventriculogram;  Surgeon: Tonny Anna MD;  Location: [facility];  Service: Cardiology     LAPAROSCOPIC CHOLECYSTECTOMY       CT BIOPSY OF SKIN LESION      Description: Biopsy Skin;  Recorded: 2009;     VITALS:  Vitals:    19 1440   BP: 120/60   Patient Site: Left Arm   Patient Position: Sitting   Cuff Size: Adult Large   Pulse: 77   SpO2: 96%   Weight: (!) 243 lb (110.2 kg)   Height: 6' 1\" (1.854 m)     Wt Readings from Last 3 Encounters:   19 (!) 243 lb (110.2 kg)   19 (!) 245 lb (111.1 kg)   19 (!) 246 lb 11.2 oz (111.9 " kg)     PHYSICAL EXAM:  Constitutional:  In NAD, alert and oriented  Neck: no significant adenopathy.  Cardiac:  S1 S2 without murmur  Lungs: Clear to auscultation  Abdomen:   Soft, flat and non-tender, without guarding, rebound, or mass.      DECISION TO OBTAIN OLD RECORDS AND/OR OBTAIN HISTORY FROM SOMEONE OTHER THAN PATIENT, AND/OR ACCESSING CARE EVERYWHERE):  1  0     REVIEW AND SUMMARIZATION OF OLD RECORDS, AND/OR OBTAINING HISTORY FROM SOMEONE OTHER THAN PATIENT, AND/OR DISCUSSION OF CASE WITH ANOTHER HEALTH CARE PROVIDER:  2 reviewed hospitalization for cor angio and stents    REVIEW AND/OR ORDER OF OF CLINICAL LAB TESTS: 1  Reviewed past A1c.    REVIEW AND/OR ORDER OF RADIOLOGY TESTS: 1 reviewed his cor angio .    REVIEW AND/OR ORDER OF MEDICAL TESTS (EKG/ECHO/COLONOSCOPY/EGD): 1  0    INDEPENDENT  VISUALIZATION OF IMAGE, TRACING, OR SPECIMEN ITSELF (2 EACH):  0     TOTAL: 4    Current Outpatient Medications   Medication Sig Dispense Refill     albuterol (PROAIR HFA;PROVENTIL HFA;VENTOLIN HFA) 90 mcg/actuation inhaler Inhale 2 puffs every 6 (six) hours as needed for wheezing or shortness of breath. 1 Inhaler 12     aluminum-magnesium hydroxide 200-200 mg/5 mL suspension Take 30 mL by mouth 4 (four) times a day as needed for indigestion.       aspirin (ASPIRIN LOW DOSE) 81 MG EC tablet Take 81 mg by mouth daily.              atorvastatin (LIPITOR) 80 MG tablet Take 1 tablet (80 mg total) by mouth daily. 30 tablet 0     calcium, as carbonate, (TUMS) 200 mg calcium (500 mg) chewable tablet Chew 1 tablet as needed for heartburn.              coenzyme Q10 (COQ-10) 100 mg capsule Take 1 capsule (100 mg total) by mouth daily.  0     escitalopram oxalate (LEXAPRO) 10 MG tablet Take 1 tablet (10 mg total) by mouth daily. 30 tablet 2     fluticasone (FLONASE) 50 mcg/actuation nasal spray INHALE 1 TO 2 SPRAYS INTO EACH NOSTRIL ONCE DAILY 48 g 2     fluticasone furoate (ARNUITY ELLIPTA) 200 mcg/actuation DsDv Inhale 1  puff daily. 30 each 12     lansoprazole (PREVACID) 30 MG capsule Take 30 mg by mouth daily.       lansoprazole (PREVACID) 30 MG capsule Take 30 mg by mouth daily as needed (heartburn). Takes in addition to QAM dose if needed       LORazepam (ATIVAN) 1 MG tablet TAKE 1 TABLET (1 MG TOTAL) BY MOUTH AS NEEDED FOR ANXIETY. 60 tablet 2     losartan (COZAAR) 50 MG tablet Take 1.5 tablets (75 mg total) by mouth daily. Take 1.5 tablets daily       magnesium chloride (SLOW-MAG) 64 mg TbEC delayed-release tablet Take 2 tablets (128 mg total) by mouth daily.  0     metoprolol succinate (TOPROL-XL) 25 MG Take 1 tablet (25 mg total) by mouth daily. 30 tablet 0     metroNIDAZOLE (METROGEL) 1 % gel Apply 1 application topically daily. Apply sparingly to affected area(s) once daily.       nitroglycerin (NITROSTAT) 0.3 MG SL tablet Place 1 tablet (0.3 mg total) under the tongue every 5 (five) minutes as needed. 1 Bottle 5     ranitidine (ZANTAC) 150 MG tablet Take 1 tablet (150 mg total) by mouth 2 (two) times a day as needed for heartburn.       simethicone (GAS-X) 80 MG chewable tablet Chew 80 mg every 6 (six) hours as needed for flatulence.              ticagrelor (BRILINTA) 90 mg Tab Take 1 tablet (90 mg total) by mouth 2 (two) times a day. 60 tablet 0     No current facility-administered medications for this visit.      Adolfo Kraus MD  Internal Medicine  [facility]

## 2021-05-29 NOTE — PROGRESS NOTES
ITP ASSESSMENT   Assessment Day: 30 Day    Session Number: 5  Precautions: Standard    Diagnosis: Stent    Risk Stratification: High    Referring Provider: Shoshana  EXERCISE  Exercise Assessment: Reassessment    Pt exercising in CR for 40 min, denies sx's. Met levels are 2.5-2.8                         Exercise Plan  Goals Next 30 days  ADL'S: Walking program- Walk 30 min 4-5 x week    Leisure: Return to water exercise.    Work: Continue to attend CR 2 x week      Education Goals: All goals in this section met    Education Goals Met: Patient can state cardiac s/s and appropriate emergency response.;Has system for taking medication.;Medication review.                          Goals Met  Initial ADL's goals met: Goal met    Initial Leisure goals met: Goal not met    Intial Work goals met: Goal met    Initial Progression: Pt continues to progress. Goals reviewed and updated.      Exercise Prescription  Exercise Mode: Treadmill;Nustep;Bike;Arm Erg.;Hallway Walking    Frequency: 2 x week    Duration: 30-35 min    Intensity / THR: 20-30 beats above resting heart rate    RPE 11-14  Progression / Met level: 3-3.5    Resistive Training?: Yes      Current Exercise (mins/week): 150      Interventions  Home Exercise:  Mode: Walk/ Water exercise    Frequency: 3-4 x week    Duration: 30-35 min      Education Material : Educational videos;Provide written material;Individual education and counseling;Offer educational classes      Education Completed  Exercise Education Completed: Cardiac Anatomy;Signs and Symptoms;RPE;Emergency Plan;Home Exercise;Warm up/cool down;FITT Principles;BP/HR Reponse to exercise;Benefits of Exercise;End point of exercise;Medication review              Exercise Follow-up/Discharge  Follow up/Discharge: Reviewed home exercise program     NUTRITION  Nutrition Assessment: Reassessment      Nutrition Risk Factors:  Nutrition Risk Factors: Diabetes;Dyslipidemia;Overweight  HbA1c: 6.3  Monitors blood sugar at  "home: No  Frequency: NA  Cholesterol: 140  LDL: 80  HDL: 34  Triglycerides: 169      Nutrition Plan  Interventions  Other Nutrition Intervention: Diet Class;Therapist/Pt Discussion;Educational Videos;Provide with Written Material      Education Completed  Nutrition Education Completed: Risk factor overview      Goals  Nutrition Goals (Next 30 days): Patient can identify their risk factors for CAD;Patient will follow a low sodium diet;Patient able to demonstrate carbohydrate counting;Patient will follow a low saturated fat diet;Patient will lose weight;Improve Rate Your Plate Survey Score      Goals Met  Nutrition Goals Met: Provided Rate your Plate Survey;Reviewed Dietitian schedule      Height, Weight, and  BMI  Weight: 235 lb (106.6 kg)  Height: 6' 1\" (1.854 m)  BMI: 31.01      Nutrition Follow-up  Follow-up/Discharge: Encouraged pt to meet with dietitian and attend education classes.        Other Risk Factors  Other Risk Factor Assessment: Reassessment      HTN Risk Factor: Hypertension      Pre Exercise BP: 90/60  Post Exercise BP: 100/68      Hypertension Plan  Goals  HTN Goals: Follow low sodium diet;Take medication as prescribed;Exercises regularly      Goals Met  HTN Goals Met: Take medication as prescribed;Exercises regularly      HTN Interventions  HTN Interventions: Diet consult;Therapist/patient discussion;Provide written material;Offer educational videos;Offer educational classes      HTN Education Completed  HTN Education Completed: Risk factor overview;Medication review      Tobacco Risk Factor: NA        Risk Factor Follow-up   Follow-up/Discharge: Enc to attend educational classes     PSYCHOSOCIAL  Psychosocial Assessment: Reassessment        Psychosocial Risk Factor: Stress      Psychosocial Plan  Interventions  Interventions: Offer Social Service consult;Offer Spiritual Care consult;Offer educational videos and classes;Provide written material;Offer Healing Touch referral      Education " Completed  Education Completed: Effects of stress on body;Relaxation/Coping Techniques;S/S of depression      Goals  Goals (Next 30 days): Improvement in [employer] COOP score;Practicing stress management skills      Goals Met  Goals Met: Identified Support system;Oriented to stress management classes;Identify stressors      Psychosocial Follow-up  Follow-up/Discharge: Enc educational classes           Patient involved in Goal setting?: Yes      Signature: _____________________________________________________________    Date: __________________    Time: __________________See Doc Flowsheet

## 2021-05-29 NOTE — TELEPHONE ENCOUNTER
Refill Approved    Rx renewed per Medication Renewal Policy. Medication was last renewed on 3/26/2018        [facility]     Requested Prescriptions   Pending Prescriptions Disp Refills     fluticasone propionate (FLONASE) 50 mcg/actuation nasal spray 48 g 2     Sig: INHALE 1 TO 2 SPRAYS INTO EACH NOSTRIL ONCE DAILY       Nasal Steroid Refill Protocol Passed - 6/14/2019 12:34 PM        Passed - Patient has had office visit/physical in last 2 years     Last office visit with prescriber/PCP: 4/29/2019 OR same dept: 4/29/2019 Adolfo Kraus MD OR same specialty: 4/29/2019 Adolfo Kraus MD Last physical: 3/28/2018 Last MTM visit: Visit date not found    Next appt within 3 mo: Visit date not found  Next physical within 3 mo: Visit date not found  Prescriber OR PCP: Adolfo Kraus MD  Last diagnosis associated with med order: 1. Medication management  - fluticasone propionate (FLONASE) 50 mcg/actuation nasal spray; INHALE 1 TO 2 SPRAYS INTO EACH NOSTRIL ONCE DAILY  Dispense: 48 g; Refill: 2     If protocol passes may refill for 12 months if within 3 months of last provider visit (or a total of 15 months).

## 2021-05-29 NOTE — PROGRESS NOTES
ITP ASSESSMENT   Assessment Day: 60 Day    Session Number: 8  Precautions: Standard    Diagnosis: Stent    Risk Stratification: High    Referring Provider: Adolfo Kraus MD  EXERCISE  Exercise Assessment: Reassessment        Tolerates 35-40' of exercise at 2.5-2.7 Mets                         Exercise Plan  Goals Next 30 days  ADL'S: Will set new goals when pt returns    Leisure: Return to water exercise.    Work: Continue to attend CR 2 x week      Education Goals: All goals in this section met    Education Goals Met: Patient can state cardiac s/s and appropriate emergency response.;Has system for taking medication.;Medication review.                          Goals Met  30 day ADL'S goals met: Will assess goals when pt resumes      Initial ADL's goals met: Goal met    Initial Leisure goals met: Goal not met    Intial Work goals met: Goal met    Initial Progression: Pt continues to progress. Goals reviewed and updated.      Exercise Prescription  Exercise Mode: Treadmill;Nustep;Bike;Arm Erg.;Hallway Walking    Frequency: 2 x week    Duration: 30-40'    Intensity / THR: 20-30 beats above resting heart rate    RPE 11-14  Progression / Met level: 2.5-2.7    Resistive Training?: Yes      Current Exercise (mins/week): 150      Interventions  Home Exercise:  Mode: Walk/ Water Exercise     Frequency: 3-5 x week    Duration: 30-35'      Education Material : Educational videos;Provide written material;Individual education and counseling;Offer educational classes      Education Completed  Exercise Education Completed: Cardiac Anatomy;Signs and Symptoms;RPE;Emergency Plan;Home Exercise;Warm up/cool down;FITT Principles;BP/HR Reponse to exercise;Benefits of Exercise;End point of exercise;Medication review              Exercise Follow-up/Discharge  Follow up/Discharge: Reviewed home exercise program   NUTRITION  Nutrition Assessment: Reassessment      Nutrition Risk Factors:  Nutrition Risk Factors:  "Diabetes;Dyslipidemia;Overweight  HbA1c: 6.3  Monitors blood sugar at home: No  Cholesterol: 140  LDL: 80  HDL: 34  Triglycerides: 169      Nutrition Plan  Interventions  Diet Consult: Completed    Other Nutrition Intervention: Diet Class;Therapist/Pt Discussion;Educational Videos;Provide with Written Material    Initial Rate Your Plate Score: 58      Education Completed  Nutrition Education Completed: Low Saturated fat diet;Low sodium diet;Carbohydrate counting      Goals  Nutrition Goals (Next 30 days): Patient demonstrated understanding of cardiac nutrition, no goals identified for the next 30 days      Goals Met  Nutrition Goals Met: Patient follows a low sodium diet;Patient able to demonstrate carbohydrate counting;Patient states following a low saturated fat diet      Height, Weight, and  BMI  Weight: 233 lb (105.7 kg)  Height: 6' 1\" (1.854 m)  BMI: 30.75      Nutrition Follow-up  Follow-up/Discharge: Encouraged pt to  slightly increase complex carbohydrate and not reduce Na any further         Other Risk Factors  Other Risk Factor Assessment: Reassessment      HTN Risk Factor: Hypertension      Pre Exercise BP: 104/60  Post Exercise BP: 100/68      Hypertension Plan  Goals  HTN Goals: Follow low sodium diet;Take medication as prescribed;Exercises regularly      Goals Met  HTN Goals Met: Take medication as prescribed;Exercises regularly      HTN Interventions  HTN Interventions: Diet consult;Therapist/patient discussion;Provide written material;Offer educational videos;Offer educational classes      HTN Education Completed  HTN Education Completed: Risk factor overview;Medication review      Tobacco Risk Factor: NA          Risk Factor Follow-up   Follow-up/Discharge: Will encourage pt to attend Educational Classes     PSYCHOSOCIAL  Psychosocial Assessment: Reassessment      Psychosocial Risk Factor: Stress      Psychosocial Plan  Interventions  Interventions: Offer Social Service consult;Offer Spiritual Care " consult;Offer educational videos and classes;Provide written material;Offer Healing Touch referral      Education Completed  Education Completed: Effects of stress on body;Relaxation/Coping Techniques;S/S of depression      Goals  Goals (Next 30 days): Improvement in [employer] COOP score;Practicing stress management skills      Goals Met  Goals Met: Identified Support system;Oriented to stress management classes;Identify stressors      Psychosocial Follow-up  Follow-up/Discharge: Will review stress reduction techniques when pt resumes             Patient involved in Goal setting?: No      Signature: _____________________________________________________________    Date: __________________    Time: __________________

## 2021-05-30 VITALS — HEIGHT: 73 IN | BODY MASS INDEX: 34.85 KG/M2 | WEIGHT: 263 LBS

## 2021-05-30 VITALS — WEIGHT: 265 LBS | HEIGHT: 73 IN | BODY MASS INDEX: 35.12 KG/M2

## 2021-05-30 VITALS — BODY MASS INDEX: 34.85 KG/M2 | HEIGHT: 73 IN | WEIGHT: 263 LBS

## 2021-05-30 VITALS — BODY MASS INDEX: 35.25 KG/M2 | WEIGHT: 266 LBS | HEIGHT: 73 IN

## 2021-05-30 VITALS — BODY MASS INDEX: 35.12 KG/M2 | HEIGHT: 73 IN | WEIGHT: 265 LBS

## 2021-05-30 NOTE — PROGRESS NOTES
ITP ASSESSMENT   Assessment Day: 90 Day    Session Number: 8  Precautions: Standard    Diagnosis: Stent    Risk Stratification: High    Referring Provider: Adolfo Kraus MD   ITP: Dr. Gordon  EXERCISE  Exercise Assessment: Discharge    Pt attended 8 sessions of Phase II Cardiac Rehab. Tolerated 2.7 mets for 40 minutes with some back pain that he is now going to Physical Therapy for. Pt was on hold for over 1 month and we have not heard back about restarting Cardiac Rehab. We will be discharging today.                          Exercise Plan  Education Goals: All goals in this section met                        Goals Met  60 Day Progression: Pt on hold due to rehab for his back, discharging today.    30 day ADL'S goals met: Will assess goals when pt resumes    Initial ADL's goals met: Goal met    Initial Leisure goals met: Goal not met    Intial Work goals met: Goal met    Initial Progression: Pt continues to progress. Goals reviewed and updated.    Exercise Prescription  Exercise Mode: Treadmill;Nustep;Bike;Arm Erg.;Hallway Walking    Frequency: 2x/week    Duration: 30-40 Minutes    Intensity / THR: 20-30 beats above resting heart rate    RPE 11-14  Progression / Met level: 2.5-2.7    Resistive Training?: Yes    Current Exercise (mins/week): 150    Interventions  Home Exercise:  Mode: Walk/Water Exercise    Frequency: 3-5x/week    Duration: 30-35 Minutes    Education Material : Educational videos;Provide written material;Individual education and counseling;Offer educational classes    Education Completed  Exercise Education Completed: Cardiac Anatomy;Signs and Symptoms;RPE;Emergency Plan;Home Exercise;Warm up/cool down;FITT Principles;BP/HR Reponse to exercise;Benefits of Exercise;End point of exercise;Medication review            Exercise Follow-up/Discharge  Follow up/Discharge: Pt not available for discharge.   NUTRITION  Nutrition Assessment: Discharge    Nutrition Risk Factors:  Nutrition Risk Factors:  "Diabetes;Dyslipidemia;Overweight  HbA1c: 6.3  Monitors blood sugar at home: No  Frequency: NA  Cholesterol: 140  LDL: 80  HDL: 34  Triglycerides: 169    Nutrition Plan  Interventions  Diet Consult: Completed    Other Nutrition Intervention: Diet Class;Therapist/Pt Discussion;Educational Videos;Provide with Written Material    Initial Rate Your Plate Score: 58    Education Completed  Nutrition Education Completed: Low Saturated fat diet;Low sodium diet;Carbohydrate counting    Goals  Nutrition Goals (Next 30 days): Patient demonstrated understanding of cardiac nutrition, no goals identified for the next 30 days    Goals Met  Nutrition Goals Met: Patient follows a low sodium diet;Patient able to demonstrate carbohydrate counting;Patient states following a low saturated fat diet    Height, Weight, and  BMI  Weight: 233 lb (105.7 kg)  Height: 6' 1\" (1.854 m)  BMI: 30.75    Nutrition Follow-up  Follow-up/Discharge: Encouraged pt to  slightly increase complex carbohydrate and not reduce Na any further         Other Risk Factors  Other Risk Factor Assessment: Discharge    HTN Risk Factor: Hypertension    Pre Exercise BP: 104/60  Post Exercise BP: 100/68    Hypertension Plan  Goals  HTN Goals: Follow low sodium diet;Take medication as prescribed;Exercises regularly    Goals Met  HTN Goals Met: Take medication as prescribed;Exercises regularly    HTN Interventions  HTN Interventions: Diet consult;Therapist/patient discussion;Provide written material;Offer educational videos;Offer educational classes    HTN Education Completed  HTN Education Completed: Risk factor overview;Medication review    Tobacco Risk Factor: NA    Risk Factor Follow-up   Follow-up/Discharge: Pt not available for discharge.   PSYCHOSOCIAL  Psychosocial Assessment: Discharge    Psychosocial Risk Factor: Stress    Psychosocial Plan  Interventions  Interventions: Offer Social Service consult;Offer Spiritual Care consult;Offer educational videos and " classes;Provide written material;Offer Healing Touch referral    Education Completed  Education Completed: Effects of stress on body;Relaxation/Coping Techniques;S/S of depression    Goals  Goals (Next 30 days): Improvement in [employer] COOP score;Practicing stress management skills    Goals Met  Goals Met: Identified Support system;Oriented to stress management classes;Identify stressors    Psychosocial Follow-up  Follow-up/Discharge: Pt not available for discharge.             Patient involved in Goal setting?: No      Signature: _____________________________________________________________    Date: __________________    Time: __________________

## 2021-05-30 NOTE — PROGRESS NOTES
Order for Durable Medical Equipment was processed and equipment ordered.     DME provider: [facility] [place]    Date Faxed: 7/16/2019    Ordering Provider: Catarino Burnett DO    PAP Order Type: Mask/Supplies    Fax Number: IN HOUSE DME: [facility]

## 2021-05-30 NOTE — PROGRESS NOTES
Assessment and Plan:     1. Hyperlipidemia LDL goal <70  Will assess control.   - Lipid Profile  - HM1(CBC and Differential)  - Comprehensive Metabolic Panel    2. Type 2 diabetes mellitus without complication  Asymptomatic, improving with weigh tloss.   - Glycosylated Hemoglobin A1c    3. Coronary artery disease due to lipid rich plaque  Recent stent placement.  No angina, doing well.      4. Essential hypertension  Controlled with current treatment.     5. STEVE (obstructive sleep apnea)  May improve with weight loss. Seeing sleep clinic for evaluation of better treatment.     6. Chronic prescription benzodiazepine use  Prn use for panic anxiety which is improving, likely cardiac related.  On escitalopram.  Feeling overall less panic.     7. Screening for prostate cancer    - PSA, Annual Screen (Prostatic-Specific Antigen)    8. Panic anxiety syndrome  See above continue SSRI treatment.   - escitalopram oxalate (LEXAPRO) 10 MG tablet; Take 1 tablet (10 mg total) by mouth daily.  Dispense: 30 tablet; Refill: 2     The patient's current medical problems were reviewed.    I have had an Advance Directives discussion with the patient.  The following health maintenance schedule was reviewed with the patient and provided in printed form in the after visit summary:   Health Maintenance   Topic Date Due     ZOSTER VACCINES (2 of 3) 03/20/2012     DIABETES URINE MICROALBUMIN  12/09/2017     DIABETES FOLLOW-UP  06/28/2018     DIABETES FOOT EXAM  12/28/2018     ADVANCE DIRECTIVES DISCUSSED WITH PATIENT  05/01/2019     DIABETES HEMOGLOBIN A1C  05/14/2019     DIABETES OPHTHALMOLOGY EXAM  10/29/2019     FALL RISK ASSESSMENT  04/03/2020     ASTHMA CONTROL TEST  04/29/2020     ASTHMA FOLLOW-UP  04/29/2020     TD 18+ HE  12/04/2024     COLONOSCOPY  10/23/2027     PNEUMOCOCCAL POLYSACCHARIDE VACCINE AGE 65 AND OVER  Completed     INFLUENZA VACCINE RULE BASED  Completed     PNEUMOCOCCAL CONJUGATE VACCINE FOR ADULTS (PCV13 OR PREVNAR)  " Completed        Subjective:   Chief Complaint: Nadir Cantu is an 69 y.o. male here for an Annual Wellness visit.   HPI:  He is feeling well.  Since stent placement, has lost weight, eating better, feels better.  No symptoms of chest pain or unusual dyspnea.  Seeing sleep about his STEVE treatment.  No unusual cough, or bowel or bladder symptoms.      Review of Systems: Please see above.  The rest of the review of systems are negative for all systems.    Patient Care Team:  Adolfo Kraus MD as PCP - General (Internal Medicine)     Patient Active Problem List   Diagnosis     Irritable bowel syndrome     Chronic Reflux Esophagitis     Obesity     Chronic pain due to trauma     Cervicalgia     Type 2 diabetes mellitus without complication (H)     Hearing Loss     Asthma     Peripheral vascular disease (H)     Coronary artery disease due to lipid rich plaque     Essential hypertension     STEVE (obstructive sleep apnea)     Hyperlipidemia LDL goal <70     Chronic prescription benzodiazepine use     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      Chronic back pain      Chronic prescription benzodiazepine use 6/27/2019     Coronary artery disease due to lipid rich plaque 4/19/2019     Diabetes mellitus (H)     \"pre-DM\"     Dyslipidemia, goal LDL below 70      Essential hypertension      Hearing loss      Hyperlipidemia LDL goal <70 6/27/2019     Left Rotator Cuff Tendonitis      Obesity      STEVE (obstructive sleep apnea)      Peripheral vascular disease (H)      Reflux esophagitis      Salmonella dysentery 2014     Vitamin D deficiency       Past Surgical History:   Procedure Laterality Date     APPENDECTOMY       CAROTID ENDARTERECTOMY  2013     CATARACT OS  09/21/2014     CV CORONARY ANGIOGRAM N/A 4/19/2019    Procedure: Coronary Angiogram;  Surgeon: Tonny Anna MD;  Location: [facility] Cath Lab;  Service: Cardiology     CV LEFT HEART " CATHETERIZATION WO LEFT VETRICULOGRAM Left 2019    Procedure: Left Heart Catheterization Without Left Ventriculogram;  Surgeon: Tonny Anna MD;  Location: [facility] Cath Lab;  Service: Cardiology     LAPAROSCOPIC CHOLECYSTECTOMY       CO BIOPSY OF SKIN LESION      Description: Biopsy Skin;  Recorded: 2009;      Family History   Problem Relation Age of Onset     Heart disease Father      Heart disease Sister       Social History     Socioeconomic History     Marital status:      Spouse name: Allie     Number of children: Not on file     Years of education: Not on file     Highest education level: Not on file   Occupational History     Occupation: TEACHER/, 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      Employer: [employer]     Comment: [employer]   Social Needs     Financial resource strain: Not on file     Food insecurity:     Worry: Not on file     Inability: Not on file     Transportation needs:     Medical: Not on file     Non-medical: Not on file   Tobacco Use     Smoking status: Former Smoker     Types: Cigarettes     Last attempt to quit: 1969     Years since quittin.2     Smokeless tobacco: Never Used   Substance and Sexual Activity     Alcohol use: No     Drug use: No     Sexual activity: Yes     Partners: Female   Lifestyle     Physical activity:     Days per week: Not on file     Minutes per session: Not on file     Stress: Not on file   Relationships     Social connections:     Talks on phone: Not on file     Gets together: Not on file     Attends Hindu service: Not on file     Active member of club or organization: Not on file     Attends meetings of clubs or organizations: Not on file     Relationship status: Not on file     Intimate partner violence:     Fear of current or ex partner: Not on file     Emotionally abused: Not on file     Physically abused: Not on file     Forced sexual activity: Not on file   Other Topics Concern     Not  on file   Social History Narrative     Not on file      Current Outpatient Medications   Medication Sig Dispense Refill     albuterol (PROAIR HFA;PROVENTIL HFA;VENTOLIN HFA) 90 mcg/actuation inhaler Inhale 2 puffs every 6 (six) hours as needed for wheezing or shortness of breath. 1 Inhaler 12     aluminum-magnesium hydroxide 200-200 mg/5 mL suspension Take 30 mL by mouth 4 (four) times a day as needed for indigestion.       aspirin (ASPIRIN LOW DOSE) 81 MG EC tablet Take 81 mg by mouth daily.              atorvastatin (LIPITOR) 80 MG tablet Take 1 tablet (80 mg total) by mouth daily. 30 tablet 0     calcium, as carbonate, (TUMS) 200 mg calcium (500 mg) chewable tablet Chew 1 tablet as needed for heartburn.              coenzyme Q10 (COQ-10) 100 mg capsule Take 1 capsule (100 mg total) by mouth daily.  0     escitalopram oxalate (LEXAPRO) 10 MG tablet Take 1 tablet (10 mg total) by mouth daily. 30 tablet 2     fluticasone furoate (ARNUITY ELLIPTA) 200 mcg/actuation DsDv Inhale 1 puff daily. 30 each 12     fluticasone propionate (FLONASE) 50 mcg/actuation nasal spray INHALE 1 TO 2 SPRAYS INTO EACH NOSTRIL ONCE DAILY 48 g 2     gabapentin (NEURONTIN) 100 MG capsule TAKE 1 CAPSULE AT BEDTIME FOR 3 DAYS, THEN 1 CAP 2 TIMES A DAY FOR 3 DAYS, THEN 1 CAP 3 TIMES DAILY  1     lansoprazole (PREVACID) 30 MG capsule Take 30 mg by mouth daily.       lansoprazole (PREVACID) 30 MG capsule Take 30 mg by mouth daily as needed (heartburn). Takes in addition to QAM dose if needed       LORazepam (ATIVAN) 1 MG tablet TAKE 1 TABLET (1 MG TOTAL) BY MOUTH AS NEEDED FOR ANXIETY. 60 tablet 2     losartan (COZAAR) 25 MG tablet Take 1 tablet (25 mg total) by mouth daily.       magnesium chloride (SLOW-MAG) 64 mg TbEC delayed-release tablet Take 2 tablets (128 mg total) by mouth daily.  0     metoprolol succinate (TOPROL-XL) 25 MG Take 1 tablet (25 mg total) by mouth daily. 90 tablet 2     metroNIDAZOLE (METROGEL) 1 % gel Apply 1 application  "topically daily. Apply sparingly to affected area(s) once daily.       nitroglycerin (NITROSTAT) 0.3 MG SL tablet Place 1 tablet (0.3 mg total) under the tongue every 5 (five) minutes as needed. 1 Bottle 5     ranitidine (ZANTAC) 150 MG tablet Take 1 tablet (150 mg total) by mouth 2 (two) times a day as needed for heartburn.       simethicone (GAS-X) 80 MG chewable tablet Chew 80 mg every 6 (six) hours as needed for flatulence.              ticagrelor (BRILINTA) 90 mg Tab Take 1 tablet (90 mg total) by mouth 2 (two) times a day. 180 tablet 2     No current facility-administered medications for this visit.       Objective:   Vital Signs:   Visit Vitals  /72 (Patient Site: Left Arm, Patient Position: Sitting, Cuff Size: Adult Regular)   Pulse 66   Ht 6' 0.5\" (1.842 m)   Wt (!) 229 lb 4 oz (104 kg)   SpO2 98%   BMI 30.66 kg/m       PHYSICAL EXAM:  General Appearance: In no acute distress  /72 (Patient Site: Left Arm, Patient Position: Sitting, Cuff Size: Adult Regular)   Pulse 66   Ht 6' 0.5\" (1.842 m)   Wt (!) 229 lb 4 oz (104 kg)   SpO2 98%   BMI 30.66 kg/m    HEENT: Without congestion or inflammation  NECK:  without cervical or axillary adenopathy  RESPIRATORY: Clear to auscultation  CARDIOVASCULAR: S1, S2, without murmur   ABDOMEN: soft, flat, and non-tender, without mass, rebound, or guarding  EXTREMITIES: No joint swelling, or inflammation, distal pulses diminished, no ulcer, has minor edema  NEUROLOGIC: Non-focal, no arm or leg  weakness, speech is clear, gait is normal  PSYCHIATRIC: Oriented X 3, without confusion, behavior and affect normal, thinking is clear    Assessment Results 6/27/2019   Activities of Daily Living No help needed   Instrumental Activities of Daily Living No help needed   Mini Cog Total Score -   Some recent data might be hidden     A Mini-Cog score of 0-2 suggests the possibility of dementia, score of 3-5 suggests no dementia    Identified Health Risks:       Patient's " advanced directive was discussed and I am comfortable with the patient's wishes.

## 2021-05-30 NOTE — PROGRESS NOTES
"Dear Dr. Last, Carol Juarez, Cnp  [address] [place] [address]    Thank you for the opportunity to participate in the care of Mr. Nadir Cantu.    He is a 69 y.o. male who comes to the clinic for the transfer of care of his obstructive sleep apnea.  The patient was actually diagnosed with sleep apnea at our institution on 09/10/2017.  His apnea hypopnea index was moderately elevated at 22.5 events per hour with the lowest O2 sat of 86%.  He admits that he has not been using his CPAP machine due to mask intolerance.  After his sleep study, the patient subsequently was found to have coronary artery disease and had stent placed.  He also lost approximately 38 pounds since that time.  He wanted to establish care to see if he needs to resume CPAP therapy due to his risk factors.     Ideal Sleep-Wake Cycle(devoid of societal pressure):    Patient would try to initiate sleep at around 11-midnight with a sleep latency of 5-10 minutes. The patient would have 2-3 awakenings. Final wake up time is around 8AM.    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      Chronic back pain      Chronic prescription benzodiazepine use 6/27/2019     Coronary artery disease due to lipid rich plaque 4/19/2019     Diabetes mellitus (H)     \"pre-DM\"     Dyslipidemia, goal LDL below 70      Essential hypertension      Hearing loss      Hyperlipidemia LDL goal <70 6/27/2019     Left Rotator Cuff Tendonitis      Obesity      STEVE (obstructive sleep apnea)      Peripheral vascular disease (H)      Reflux esophagitis      Salmonella dysentery 2014     Vitamin D deficiency         Past Surgical History  Past Surgical History:   Procedure Laterality Date     APPENDECTOMY       CAROTID ENDARTERECTOMY  2013     CATARACT OS  09/21/2014     CV CORONARY ANGIOGRAM N/A 4/19/2019    Procedure: Coronary Angiogram;  Surgeon: Tonny Anna MD;  Location: " [facility];  Service: Cardiology     CV LEFT HEART CATHETERIZATION WO LEFT VETRICULOGRAM Left 4/19/2019    Procedure: Left Heart Catheterization Without Left Ventriculogram;  Surgeon: Tonny Anna MD;  Location: [facility];  Service: Cardiology     LAPAROSCOPIC CHOLECYSTECTOMY       ND BIOPSY OF SKIN LESION      Description: Biopsy Skin;  Recorded: 05/22/2009;        Meds  Current Outpatient Medications   Medication Sig Dispense Refill     albuterol (PROAIR HFA;PROVENTIL HFA;VENTOLIN HFA) 90 mcg/actuation inhaler Inhale 2 puffs every 6 (six) hours as needed for wheezing or shortness of breath. 1 Inhaler 12     aluminum-magnesium hydroxide 200-200 mg/5 mL suspension Take 30 mL by mouth 4 (four) times a day as needed for indigestion.       aspirin (ASPIRIN LOW DOSE) 81 MG EC tablet Take 81 mg by mouth daily.              atorvastatin (LIPITOR) 80 MG tablet Take 1 tablet (80 mg total) by mouth daily. 30 tablet 0     calcium, as carbonate, (TUMS) 200 mg calcium (500 mg) chewable tablet Chew 1 tablet as needed for heartburn.              coenzyme Q10 (COQ-10) 100 mg capsule Take 1 capsule (100 mg total) by mouth daily.  0     escitalopram oxalate (LEXAPRO) 10 MG tablet Take 1 tablet (10 mg total) by mouth daily. 30 tablet 2     fluticasone furoate (ARNUITY ELLIPTA) 200 mcg/actuation DsDv Inhale 1 puff daily. 30 each 12     fluticasone propionate (FLONASE) 50 mcg/actuation nasal spray INHALE 1 TO 2 SPRAYS INTO EACH NOSTRIL ONCE DAILY 48 g 2     gabapentin (NEURONTIN) 100 MG capsule TAKE 1 CAPSULE AT BEDTIME FOR 3 DAYS, THEN 1 CAP 2 TIMES A DAY FOR 3 DAYS, THEN 1 CAP 3 TIMES DAILY  1     lansoprazole (PREVACID) 30 MG capsule Take 30 mg by mouth daily.       lansoprazole (PREVACID) 30 MG capsule Take 30 mg by mouth daily as needed (heartburn). Takes in addition to QAM dose if needed       LORazepam (ATIVAN) 1 MG tablet TAKE 1 TABLET (1 MG TOTAL) BY MOUTH AS NEEDED FOR ANXIETY. 60 tablet 2      losartan (COZAAR) 25 MG tablet Take 1 tablet (25 mg total) by mouth daily.       magnesium chloride (SLOW-MAG) 64 mg TbEC delayed-release tablet Take 2 tablets (128 mg total) by mouth daily.  0     metoprolol succinate (TOPROL-XL) 25 MG Take 1 tablet (25 mg total) by mouth daily. 90 tablet 2     metroNIDAZOLE (METROGEL) 1 % gel Apply 1 application topically daily. Apply sparingly to affected area(s) once daily.       ranitidine (ZANTAC) 150 MG tablet Take 1 tablet (150 mg total) by mouth 2 (two) times a day as needed for heartburn.       simethicone (GAS-X) 80 MG chewable tablet Chew 80 mg every 6 (six) hours as needed for flatulence.              ticagrelor (BRILINTA) 90 mg Tab Take 1 tablet (90 mg total) by mouth 2 (two) times a day. 180 tablet 2     nitroglycerin (NITROSTAT) 0.3 MG SL tablet Place 1 tablet (0.3 mg total) under the tongue every 5 (five) minutes as needed. 1 Bottle 5     No current facility-administered medications for this visit.         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      Employer: [employer]     Comment: [employer]   Social Needs     Financial resource strain: Not on file     Food insecurity:     Worry: Not on file     Inability: Not on file     Transportation needs:     Medical: Not on file     Non-medical: Not on file   Tobacco Use     Smoking status: Former Smoker     Types: Cigarettes     Last attempt to quit: 1969     Years since quittin.2     Smokeless tobacco: Never Used   Substance and Sexual Activity     Alcohol use: No     Drug use: No     Sexual activity: Yes     Partners: Female    Lifestyle     Physical activity:     Days per week: Not on file     Minutes per session: Not on file     Stress: Not on file   Relationships     Social connections:     Talks on phone: Not on file     Gets together: Not on file     Attends Religion service: Not on file     Active member of club or organization: Not on file     Attends meetings of clubs or organizations: Not on file     Relationship status: Not on file     Intimate partner violence:     Fear of current or ex partner: Not on file     Emotionally abused: Not on file     Physically abused: Not on file     Forced sexual activity: Not on file   Other Topics Concern     Not on file   Social History Narrative     Not on file        Family History  Family History   Problem Relation Age of Onset     Heart disease Father      Heart disease Sister         Review of Systems:  Constitutional: Negative except as noted in HPI.   Eyes: Negative except as noted in HPI.   ENT: Negative except as noted in HPI.   Cardiovascular: Negative except as noted in HPI.   Respiratory: Negative except as noted in HPI.   Gastrointestinal: Negative except as noted in HPI.   Genitourinary: Negative except as noted in HPI.   Musculoskeletal: Negative except as noted in HPI.   Integumentary: Negative except as noted in HPI.   Neurological: Negative except as noted in HPI.   Psychiatric: Negative except as noted in HPI.   Endocrine: Negative except as noted in HPI.   Hematologic/Lymphatic: Negative except as noted in HPI.      STOP BANG 12/28/2016   Do you snore loudly (louder than talking or loud enough to be heard through closed doors)? 1   Do you often feel tired, fatigued, or sleepy during daytime? 1   Has anyone observed you stop breathing in your sleep? 0   Do you have or are you being treated for high blood pressure? 1   BMI more than 35 kg/m2 0   Age over 50 years old? 1   Neck circumference greater than 16 inches? 1   Gender male? 1   Total Score 6     Epworths Sleepiness  "Scale 12/28/2016 7/5/2017 7/16/2019   Sitting and reading 1 1 0   Watching TV 1 1 0   Sitting, inactive in a public place (e.g. a theatre or a meeting) 2 1 2   As a passenger in a car for an hour without a break 1 0 0   Lying down to rest in the afternoon when circumstances permit 3 3 3   Sitting and talking to someone 1 0 0   Sitting quietly after a lunch without alcohol 2 0 0   In a car, while stopped for a few minutes in traffic 2 0 0   Total score 13 6 5     Rooming 12/28/2016   Usual bedtime 1030p   Sleep Latency 20min   Awakenings 5-6   Wake Up Time 7a   Weekends same   Energy Drinks 0   Coffee 3c   Cola 2week   Difficulty falling asleep No   Difficulty staying asleep Yes   Excessive daytime tiredness Yes   Excessive daytime sleepiness Yes   Dozing off while driving No   Shift Worker No   Sleep Walking? No   Sleep Talking? No   Kicking or punching? No   Restless legs symptoms No       Physical Exam:  /74   Pulse 75   Ht 6' 0.5\" (1.842 m)   Wt (!) 234 lb (106.1 kg)   SpO2 93%   BMI 31.30 kg/m    BMI:Body mass index is 31.3 kg/m .   GEN: NAD, obese  Head: Normocephalic.  EYES: PERRLA, EOMI  ENT: Oropharynx is clear, Sanchez class 4+ airway.   Nasal mucosa is moist without erythema  Neck : Thyroid is within normal limits.  CV: Regular rate and rhythm, S1 & S2 positive.  LUNGS: Bilateral breathsounds heard.   ABDOMEN: Positive bowel sounds in all quadrants, soft, no rebound or guarding  MUSCULOSKELETAL: Bilateral trace leg swelling  SKIN: warm, dry, no rashes  Neurological: Alert, oriented to time, place, and person.  Psych: normal mood, normal affect     Labs/Studies:     Lab Results   Component Value Date    WBC 6.2 06/27/2019    HGB 14.4 06/27/2019    HCT 42.4 06/27/2019    MCV 91 06/27/2019     06/27/2019         Chemistry        Component Value Date/Time     06/27/2019 1038    K 4.3 06/27/2019 1038     06/27/2019 1038    CO2 21 (L) 06/27/2019 1038    BUN 19 06/27/2019 1038    " CREATININE 0.84 06/27/2019 1038     06/27/2019 1038        Component Value Date/Time    CALCIUM 10.4 06/27/2019 1038    ALKPHOS 44 (L) 06/27/2019 1038    AST 17 06/27/2019 1038    ALT 26 06/27/2019 1038    BILITOT 0.8 06/27/2019 1038            Lab Results   Component Value Date    FERRITIN 114 04/06/2017     Lab Results   Component Value Date    TSH 2.81 11/14/2018         Assessment and Plan:  In summary Nadir Cantu is a 69 y.o. year old male here for transfer of care.  1.  Obstructive sleep apnea  I educated the patient on the underlying pathophysiology of obstructive sleep apnea.  Due to his cardiovascular risk factors I strongly recommend that he resume CPAP therapy.  I also strongly recommend that he discuss with [employer] different mask options.  I would like him to bring his CPAP machine with him on the next clinical visit with me.  2.  Other sleep disturbance    Patient verbalized understanding of these issues, agrees with the plan and all questions were answered today. Patient was given an opportuntity to voice any other symptoms or concerns not listed above. Patient did not have any other symptoms or concerns.       Catarino Burnett DO  Board Certified in Internal Medicine and Sleep Medicine  [facility].    (Note created with Dragon voice recognition and unintended spelling errors and word substitutions may occur)

## 2021-05-31 VITALS — WEIGHT: 264 LBS | HEIGHT: 73 IN | BODY MASS INDEX: 34.99 KG/M2

## 2021-05-31 VITALS — HEIGHT: 73 IN | WEIGHT: 264 LBS | BODY MASS INDEX: 34.99 KG/M2

## 2021-05-31 VITALS — BODY MASS INDEX: 34.99 KG/M2 | WEIGHT: 264 LBS | HEIGHT: 73 IN

## 2021-05-31 VITALS — WEIGHT: 263.2 LBS | BODY MASS INDEX: 34.88 KG/M2 | HEIGHT: 73 IN

## 2021-05-31 VITALS — HEIGHT: 73 IN | WEIGHT: 252 LBS | BODY MASS INDEX: 33.4 KG/M2

## 2021-05-31 VITALS — HEIGHT: 73 IN | BODY MASS INDEX: 33.6 KG/M2 | WEIGHT: 253.5 LBS

## 2021-05-31 VITALS — WEIGHT: 261.4 LBS | BODY MASS INDEX: 34.49 KG/M2

## 2021-06-01 ENCOUNTER — RECORDS - HEALTHEAST (OUTPATIENT)
Dept: ADMINISTRATIVE | Facility: CLINIC | Age: 71
End: 2021-06-01

## 2021-06-01 VITALS — HEIGHT: 72 IN | WEIGHT: 244.6 LBS | BODY MASS INDEX: 33.13 KG/M2

## 2021-06-01 VITALS — WEIGHT: 248.13 LBS | BODY MASS INDEX: 33.65 KG/M2

## 2021-06-01 NOTE — PATIENT INSTRUCTIONS - HE
1.  Take ciprofloxacin 500 mg by mouth twice daily for seven days.     2. Follow up in 3 months.

## 2021-06-01 NOTE — PROGRESS NOTES
" ASSESSMENT AND PLAN:    1. Urinary tract infection   Will treat another 7 days.    - ciprofloxacin HCl (CIPRO) 500 MG tablet; Take 1 tablet (500 mg total) by mouth 2 (two) times a day for 7 days.  Dispense: 14 tablet; Refill: 0    2. Sepsis due to Escherichia coli (H)  Recently treated in [place].  Symptoms of sepsis have improved.  Mild dysuria.  He likely has a element of prostatitis.      3. Coronary artery disease  Recent stent placement.  No symptoms of angina.     Patient Instructions   1.  Take ciprofloxacin 500 mg by mouth twice daily for seven days.     2. Follow up in 3 months.      CHIEF COMPLAINT:  Chief Complaint   Patient presents with     Follow-up     ED, [facility], 19     HISTORY OF PRESENT ILLNESS:  Nadir Cantu is a 69 y.o. male with recent illness on vacation in [place].  He became 'shakey' and very weak with nausea.  Found to have UTI with sepsis.  Treated with 5 days of SMP/TMX.  He has improved, but still has mild dysuria, no hematuria. Has not had chest pain.  Initially when acutely ill, there was mild confusion, which has cleared.     REVIEW OF SYSTEMS:   See HPI, all other systems on review are negative.    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      Chronic back pain      Chronic prescription benzodiazepine use 2019     Coronary artery disease due to lipid rich plaque 2019     Diabetes mellitus (H)     \"pre-DM\"     Dyslipidemia, goal LDL below 70      Essential hypertension      Hearing loss      Hyperlipidemia LDL goal <70 2019     Left Rotator Cuff Tendonitis      Obesity      STEVE (obstructive sleep apnea)      Peripheral vascular disease (H)      Reflux esophagitis      Salmonella dysentery      Vitamin D deficiency      Social History     Tobacco Use   Smoking Status Former Smoker     Types: Cigarettes     Last attempt to quit: 1969     Years since quittin.4 " "  Smokeless Tobacco Never Used     Family History   Problem Relation Age of Onset     Heart disease Father      Snoring Father      Heart disease Sister      Past Surgical History:   Procedure Laterality Date     APPENDECTOMY       CAROTID ENDARTERECTOMY  2013     CATARACT OS  09/21/2014     CV CORONARY ANGIOGRAM N/A 4/19/2019    Procedure: Coronary Angiogram;  Surgeon: Tonny Anna MD;  Location: [facility];  Service: Cardiology     CV LEFT HEART CATHETERIZATION WO LEFT VETRICULOGRAM Left 4/19/2019    Procedure: Left Heart Catheterization Without Left Ventriculogram;  Surgeon: Tonny Anna MD;  Location: [facility];  Service: Cardiology     LAPAROSCOPIC CHOLECYSTECTOMY       [place] BIOPSY OF SKIN LESION      Description: Biopsy Skin;  Recorded: 05/22/2009;     VITALS:  Vitals:    09/19/19 1211   BP: 104/82   Patient Site: Left Arm   Patient Position: Sitting   Cuff Size: Adult Large   Pulse: 66   SpO2: 98%   Weight: (!) 229 lb (103.9 kg)   Height: 6' 0.5\" (1.842 m)     Wt Readings from Last 3 Encounters:   09/19/19 (!) 229 lb (103.9 kg)   07/16/19 (!) 234 lb (106.1 kg)   06/27/19 (!) 229 lb 4 oz (104 kg)     PHYSICAL EXAM:  Constitutional:  In NAD, alert and oriented  Cardiac:  S1 S2 r  Lungs: Clear   Abdomen:   Soft, flat and non-tender   Psychiatric:  Mood and behavior appropriate, thinking is clear.     DECISION TO OBTAIN OLD RECORDS AND/OR OBTAIN HISTORY FROM SOMEONE OTHER THAN PATIENT, AND/OR ACCESSING CARE EVERYWHERE):  1 reviewed [facility].      REVIEW AND SUMMARIZATION OF OLD RECORDS, AND/OR OBTAINING HISTORY FROM SOMEONE OTHER THAN PATIENT, AND/OR DISCUSSION OF CASE WITH ANOTHER HEALTH CARE PROVIDER:  2 0    REVIEW AND/OR ORDER OF OF CLINICAL LAB TESTS: 1  Reviewed UC and sensitivities.    REVIEW AND/OR ORDER OF RADIOLOGY TESTS: 1reviewed CXR      REVIEW AND/OR ORDER OF MEDICAL TESTS (EKG/ECHO/COLONOSCOPY/EGD): 1  Reviewed EKG    INDEPENDENT  VISUALIZATION OF IMAGE, " TRACING, OR SPECIMEN ITSELF (2 EACH):  0    TOTAL: 4    Current Outpatient Medications   Medication Sig Dispense Refill     albuterol (PROAIR HFA;PROVENTIL HFA;VENTOLIN HFA) 90 mcg/actuation inhaler Inhale 2 puffs every 6 (six) hours as needed for wheezing or shortness of breath. 1 Inhaler 12     aluminum-magnesium hydroxide 200-200 mg/5 mL suspension Take 30 mL by mouth 4 (four) times a day as needed for indigestion.       aspirin (ASPIRIN LOW DOSE) 81 MG EC tablet Take 81 mg by mouth daily.              atorvastatin (LIPITOR) 80 MG tablet Take 1 tablet (80 mg total) by mouth daily. 90 tablet 3     calcium, as carbonate, (TUMS) 200 mg calcium (500 mg) chewable tablet Chew 1 tablet as needed for heartburn.              coenzyme Q10 (COQ-10) 100 mg capsule Take 1 capsule (100 mg total) by mouth daily.  0     escitalopram oxalate (LEXAPRO) 10 MG tablet Take 1 tablet (10 mg total) by mouth daily. 30 tablet 2     fluticasone furoate (ARNUITY ELLIPTA) 200 mcg/actuation DsDv Inhale 1 puff daily. 30 each 12     fluticasone propionate (FLONASE) 50 mcg/actuation nasal spray INHALE 1 TO 2 SPRAYS INTO EACH NOSTRIL ONCE DAILY 48 g 2     gabapentin (NEURONTIN) 100 MG capsule TAKE 1 CAPSULE AT BEDTIME FOR 3 DAYS, THEN 1 CAP 2 TIMES A DAY FOR 3 DAYS, THEN 1 CAP 3 TIMES DAILY  1     lansoprazole (PREVACID) 30 MG capsule Take 30 mg by mouth daily.       lansoprazole (PREVACID) 30 MG capsule Take 30 mg by mouth daily as needed (heartburn). Takes in addition to QAM dose if needed       LORazepam (ATIVAN) 1 MG tablet TAKE 1 TABLET (1 MG TOTAL) BY MOUTH AS NEEDED FOR ANXIETY. 60 tablet 2     losartan (COZAAR) 25 MG tablet Take 1 tablet (25 mg total) by mouth daily.       magnesium chloride (SLOW-MAG) 64 mg TbEC delayed-release tablet Take 2 tablets (128 mg total) by mouth daily.  0     metoprolol succinate (TOPROL-XL) 25 MG Take 1 tablet (25 mg total) by mouth daily. 90 tablet 2     metroNIDAZOLE (METROGEL) 1 % gel Apply 1 application  topically daily. Apply sparingly to affected area(s) once daily.       nitroglycerin (NITROSTAT) 0.3 MG SL tablet Place 1 tablet (0.3 mg total) under the tongue every 5 (five) minutes as needed. 1 Bottle 5     ranitidine (ZANTAC) 150 MG tablet Take 1 tablet (150 mg total) by mouth 2 (two) times a day as needed for heartburn.       simethicone (GAS-X) 80 MG chewable tablet Chew 80 mg every 6 (six) hours as needed for flatulence.              ticagrelor (BRILINTA) 90 mg Tab Take 1 tablet (90 mg total) by mouth 2 (two) times a day. 180 tablet 2     ciprofloxacin HCl (CIPRO) 500 MG tablet Take 1 tablet (500 mg total) by mouth 2 (two) times a day for 7 days. 14 tablet 0          Adolfo Kraus MD  Internal Medicine  [facility]

## 2021-06-02 ENCOUNTER — RECORDS - HEALTHEAST (OUTPATIENT)
Dept: ADMINISTRATIVE | Facility: CLINIC | Age: 71
End: 2021-06-02

## 2021-06-02 VITALS — HEIGHT: 73 IN | WEIGHT: 246.7 LBS | BODY MASS INDEX: 32.7 KG/M2

## 2021-06-02 VITALS — HEIGHT: 72 IN | WEIGHT: 243 LBS | BODY MASS INDEX: 32.91 KG/M2

## 2021-06-02 VITALS — BODY MASS INDEX: 33.4 KG/M2 | WEIGHT: 248 LBS

## 2021-06-02 VITALS — WEIGHT: 248 LBS | BODY MASS INDEX: 33.59 KG/M2 | HEIGHT: 72 IN

## 2021-06-02 VITALS — WEIGHT: 246 LBS | HEIGHT: 72 IN | BODY MASS INDEX: 33.32 KG/M2

## 2021-06-02 VITALS — WEIGHT: 246.1 LBS | BODY MASS INDEX: 33.15 KG/M2

## 2021-06-02 VITALS — WEIGHT: 255 LBS | HEIGHT: 72 IN | BODY MASS INDEX: 34.54 KG/M2

## 2021-06-02 NOTE — PROGRESS NOTES
The clinic Community Health Worker talked with the patient today at the request of the PCP to discuss possible Clinic Care Coordination enrollment.  The service was described to the patient and immediate needs were discussed.  The patient declined enrollement at this time, as he is doing everything he can with the help of his wife- No help needed.   The PCP is encouraged to refer in the future if the patient's needs change.    CHW removed referral from WQ- No further outreach needed

## 2021-06-02 NOTE — PATIENT INSTRUCTIONS - HE
Nadir Cantu    It was a pleasure to see you at [facility] today.    Stop taking metoprolol  Check your blood pressure 2-3 times per week.  Losartan may be resumed if blood pressures are running more than 130/85.  Extend DANYELLE monitor from 2 weeks to 4 weeks  Record symptoms of lightheadedness or palpitations on your  DANYELLE monitor  Continue low carbohydrate diet and moderate exercise for weight reduction  Wear CPAP  Follow up appointment:   Dr. Bowman in 1 month  Pacemaker may be considered if there is symptoms associated with slow heart rates    Contact Felicia at [phone] with questions or concerns.    Leobardo Bowman MD

## 2021-06-02 NOTE — TELEPHONE ENCOUNTER
Refill Approved    Rx renewed per Medication Renewal Policy. Medication was last renewed on 6/27/19.    Mariposa Knutson, [facility] Triage/Med Refill 10/9/2019     Requested Prescriptions   Pending Prescriptions Disp Refills     escitalopram oxalate (LEXAPRO) 10 MG tablet 30 tablet 2     Sig: Take 1 tablet (10 mg total) by mouth daily.       SSRI Refill Protocol  Passed - 10/7/2019  3:10 PM        Passed - PCP or prescribing provider visit in last year     Last office visit with prescriber/PCP: 9/19/2019 Adolfo Kraus MD OR same dept: 9/19/2019 Adolfo Kraus MD OR same specialty: 9/19/2019 Adolfo Kraus MD  Last physical: 6/27/2019 Last MTM visit: Visit date not found   Next visit within 3 mo: 10/7/2019 Adolfo Kraus MD  Next physical within 3 mo: Visit date not found  Prescriber OR PCP: Adolfo Kraus MD  Last diagnosis associated with med order: 1. Panic anxiety syndrome  - escitalopram oxalate (LEXAPRO) 10 MG tablet; Take 1 tablet (10 mg total) by mouth daily.  Dispense: 30 tablet; Refill: 2    If protocol passes may refill for 12 months if within 3 months of last provider visit (or a total of 15 months).

## 2021-06-02 NOTE — TELEPHONE ENCOUNTER
Pt stopped in my office yesterday and cardiology was closed.    I called this morning and they said that he is already established with Dr Claire and they are wondering why switching as they normally will keep with the same dr.  Office note from 10/7 not yet avail.    Can pt see Dr Claire or should it be one of Leo Bowman, Jony or Xu...that is the cardioilogy question before I can schedule appt with them.    Please clarify.

## 2021-06-02 NOTE — PROGRESS NOTES
Scheduled Follow Up Call: Attempt 1 Community Health Worker called and left a message for the patient. If the patient is returning my call, please transfer the patient to Alexus at ext. [phone].      LMTCB: [facility] referral  Next Attempt 10/16/19

## 2021-06-02 NOTE — PATIENT INSTRUCTIONS - HE
1. Discussed referral to cardiology    2. Discussed referral to urology.    3. Follow up in 2 months.     4. Urine test today was negative.  \    5. Stop losartan    6. Try tamsulosin 0.4 mg at HS.

## 2021-06-02 NOTE — PROGRESS NOTES
Noted immediately after lexiscan injection having greater than 6 second pauses. Lasting approx. 16 seconds.  Symptomatic feeling dizzy, diaphoretic. Returning rhythm -110's

## 2021-06-02 NOTE — TELEPHONE ENCOUNTER
When pt calls back-also please advise of [facility] appt in [place] 10/25/19 1140am-with Dr Choudhury.   Their office will also mail out paperwork.

## 2021-06-02 NOTE — TELEPHONE ENCOUNTER
.Zestar Study Consent Visit    Study description: ECG and PPG Study: Zestar Study      Nadir Cantu a 69 y.o. male , was contacted by today to discuss participation in the Zestar study.     The patient called the Clinical Trials Office to inquire about study participation.  The consent form was reviewed with the patient.     The review of the study included:    Study purpose     Conflict of interest    Device description      Study visits    Risks of participation    Benefits (if any)    Alternatives    Voluntary participation    Confidentiality     Compensation/costs of participation    Study stipends    Injury and legal rights    The subject was queried in regards to his willingness to continue and come in for scheduled appointment. his questions were answered to his satisfaction.   The patient has given his preliminary agreement to volunteer to participate in the above noted study.     Plan: Nadir Cantu will come to [facility] on 10/23/19 to continue consent process. If he continues to agrees to participate, the study visit will be done on the same day.    Lashonda Cheema RN

## 2021-06-02 NOTE — PROGRESS NOTES
ASSESSMENT AND PLAN:    1. Flu vaccine need  given  - Influenza High Dose,Seasonal,PF 65+ Yrs    2. Dysuria  His urinalysis is negative.  Symptoms are suggestive of BPH with partial obstruction and likely bladder spasm. Had recent COYLE, when had UTI and sepsis in [place]. Trial of tamsulosin, referral to urology.   - Urinalysis-UC if Indicated  - Ambulatory referral to Urology  - tamsulosin (FLOMAX) 0.4 mg cap; Take 1 capsule (0.4 mg total) by mouth daily after supper.  Dispense: 30 capsule; Refill: 5    3. Palpitations  Episodic sustained palpitations, without lightheadedness, or near syncope or chest pain.  Known CAD and recent stents.  EKG today reveals some PVC's otherwise NSR.  Will assess electrolytes, and refer back to cardiology.  He is informed to seek care if palpitations are sustained and/or cause secondary symptoms.   - Basic Metabolic Panel  - Electrocardiogram Perform and Read  - Ambulatory referral to Cardiology    4. Type 2 diabetes mellitus   Will assess for microalbuminuria.  - Microalbumin, Random Urine    5. Orthostasis/lightheadedness   he is improved.  In the first AM getting out of bed, he will have mild orthostasis.  No syncope or focal neurologic symptoms.  This is improved with reduced dose of losartan.  Will discontinue losartan altogether now as indication is not clear.   Perhaps better diet and weight loss is making blood pressure easier to control, and during ACS, Bp was elevated related to that.      Patient Instructions   1. Discussed referral to cardiology    2. Discussed referral to urology.    3. Follow up in 2 months.     4. Urine test today was negative.  \    5. Stop losartan    6. Try tamsulosin 0.4 mg at HS.      CHIEF COMPLAINT:  Chief Complaint   Patient presents with     Follow-up     9/30, still has burning when urinationg and pain in groin     HISTORY OF PRESENT ILLNESS:  Nadir Cantu is a 69 y.o. male here with episodes of dysuria.  There is hesitancy, and  "nocturia.  The severity is intermittent.  No dark urine, or hematuria.  No fever, or chills. Still gets mild orthostasis in the AM.  No syncope or near syncope.  No chest pain, or unusual dyspnea.  Is working on weight loss.  He has also noted palpitations.  Not just occasional flip flop but mildly sustained sense of rapid heart rate.  No chest pain occurs with this or dizziness or neurologic symptoms.  No fever, or unusual cough.  No nausea or diarrhea.  Having nocturia as before 2-3 per night.     REVIEW OF SYSTEMS:   See HPI, all other systems on review are negative.    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      Chronic back pain      Chronic prescription benzodiazepine use 2019     Coronary artery disease due to lipid rich plaque 2019     Diabetes mellitus (H)     \"pre-DM\"     Dyslipidemia, goal LDL below 70      Essential hypertension      Hearing loss      Hyperlipidemia LDL goal <70 2019     Left Rotator Cuff Tendonitis      Obesity      STEVE (obstructive sleep apnea)      Peripheral vascular disease (H)      Reflux esophagitis      Salmonella dysentery 2014     Vitamin D deficiency      Social History     Tobacco Use   Smoking Status Former Smoker     Types: Cigarettes     Last attempt to quit: 1969     Years since quittin.4   Smokeless Tobacco Never Used     Family History   Problem Relation Age of Onset     Heart disease Father      Snoring Father      Heart disease Sister      Past Surgical History:   Procedure Laterality Date     APPENDECTOMY       CAROTID ENDARTERECTOMY  2013     CATARACT OS  2014     CV CORONARY ANGIOGRAM N/A 2019    Procedure: Coronary Angiogram;  Surgeon: Tonny Anna MD;  Location: [facility];  Service: Cardiology     CV LEFT HEART CATHETERIZATION WO LEFT VETRICULOGRAM Left 2019    Procedure: Left Heart Catheterization Without Left Ventriculogram;  " "Surgeon: Tonny Anna MD;  Location: [facility];  Service: Cardiology     LAPAROSCOPIC CHOLECYSTECTOMY       KS BIOPSY OF SKIN LESION      Description: Biopsy Skin;  Recorded: 05/22/2009;     VITALS:  Vitals:    10/07/19 1515   BP: 98/68   Patient Site: Left Arm   Patient Position: Sitting   Cuff Size: Adult Large   Pulse: (!) 58   SpO2: 98%   Weight: (!) 231 lb 4 oz (104.9 kg)   Height: 6' 0.5\" (1.842 m)     Wt Readings from Last 3 Encounters:   10/07/19 (!) 231 lb 4 oz (104.9 kg)   09/19/19 (!) 229 lb (103.9 kg)   07/16/19 (!) 234 lb (106.1 kg)     PHYSICAL EXAM:  Constitutional:  In NAD, alert and oriented  Neck: no significant cervical or axillary adenopathy  Cardiac:  S1 S2   Lungs: Clear   Abdomen:   Soft, flat and non-tender   Extremities: no joint effusion, mild bilateral edema     DECISION TO OBTAIN OLD RECORDS AND/OR OBTAIN HISTORY FROM SOMEONE OTHER THAN PATIENT, AND/OR ACCESSING CARE EVERYWHERE):  1  0     REVIEW AND SUMMARIZATION OF OLD RECORDS, AND/OR OBTAINING HISTORY FROM SOMEONE OTHER THAN PATIENT, AND/OR DISCUSSION OF CASE WITH ANOTHER HEALTH CARE PROVIDER:  2 reviewed cardiology evaluations    REVIEW AND/OR ORDER OF OF CLINICAL LAB TESTS: 1  Urinalysis today    REVIEW AND/OR ORDER OF RADIOLOGY TESTS: 1 0    REVIEW AND/OR ORDER OF MEDICAL TESTS (EKG/ECHO/COLONOSCOPY/EGD): 1  Ordered EGK    INDEPENDENT  VISUALIZATION OF IMAGE, TRACING, OR SPECIMEN ITSELF (2 EACH): 2 Personally viewed and interpreted the EKG and reviewed with the patient.      TOTAL: 6    Current Outpatient Medications   Medication Sig Dispense Refill     albuterol (PROAIR HFA;PROVENTIL HFA;VENTOLIN HFA) 90 mcg/actuation inhaler Inhale 2 puffs every 6 (six) hours as needed for wheezing or shortness of breath. 1 Inhaler 12     aluminum-magnesium hydroxide 200-200 mg/5 mL suspension Take 30 mL by mouth 4 (four) times a day as needed for indigestion.       aspirin (ASPIRIN LOW DOSE) 81 MG EC tablet Take 81 mg by mouth " daily.              atorvastatin (LIPITOR) 80 MG tablet Take 1 tablet (80 mg total) by mouth daily. 90 tablet 3     calcium, as carbonate, (TUMS) 200 mg calcium (500 mg) chewable tablet Chew 1 tablet as needed for heartburn.              coenzyme Q10 (COQ-10) 100 mg capsule Take 1 capsule (100 mg total) by mouth daily.  0     escitalopram oxalate (LEXAPRO) 10 MG tablet Take 1 tablet (10 mg total) by mouth daily. 30 tablet 2     fluticasone furoate (ARNUITY ELLIPTA) 200 mcg/actuation DsDv Inhale 1 puff daily. 30 each 12     fluticasone propionate (FLONASE) 50 mcg/actuation nasal spray INHALE 1 TO 2 SPRAYS INTO EACH NOSTRIL ONCE DAILY 48 g 2     gabapentin (NEURONTIN) 100 MG capsule TAKE 1 CAPSULE AT BEDTIME FOR 3 DAYS, THEN 1 CAP 2 TIMES A DAY FOR 3 DAYS, THEN 1 CAP 3 TIMES DAILY  1     lansoprazole (PREVACID) 30 MG capsule Take 30 mg by mouth daily as needed (heartburn). Takes in addition to QAM dose if needed       LORazepam (ATIVAN) 1 MG tablet TAKE 1 TABLET (1 MG TOTAL) BY MOUTH AS NEEDED FOR ANXIETY. 60 tablet 2     magnesium chloride (SLOW-MAG) 64 mg TbEC delayed-release tablet Take 2 tablets (128 mg total) by mouth daily.  0     metoprolol succinate (TOPROL-XL) 25 MG Take 1 tablet (25 mg total) by mouth daily. 90 tablet 2     metroNIDAZOLE (METROGEL) 1 % gel Apply 1 application topically daily. Apply sparingly to affected area(s) once daily.       nitroglycerin (NITROSTAT) 0.3 MG SL tablet Place 1 tablet (0.3 mg total) under the tongue every 5 (five) minutes as needed. 1 Bottle 5     ranitidine (ZANTAC) 150 MG tablet Take 1 tablet (150 mg total) by mouth 2 (two) times a day as needed for heartburn.       simethicone (GAS-X) 80 MG chewable tablet Chew 80 mg every 6 (six) hours as needed for flatulence.              ticagrelor (BRILINTA) 90 mg Tab Take 1 tablet (90 mg total) by mouth 2 (two) times a day. 180 tablet 2     tamsulosin (FLOMAX) 0.4 mg cap Take 1 capsule (0.4 mg total) by mouth daily after supper. 30  capsule 5     No current facility-administered medications for this visit.      Adolfo Kraus MD  Internal Medicine  [facility]

## 2021-06-03 VITALS — WEIGHT: 245 LBS | BODY MASS INDEX: 32.32 KG/M2

## 2021-06-03 VITALS — WEIGHT: 240 LBS | BODY MASS INDEX: 31.66 KG/M2

## 2021-06-03 VITALS
HEART RATE: 66 BPM | HEIGHT: 73 IN | OXYGEN SATURATION: 98 % | BODY MASS INDEX: 30.35 KG/M2 | DIASTOLIC BLOOD PRESSURE: 82 MMHG | WEIGHT: 229 LBS | SYSTOLIC BLOOD PRESSURE: 104 MMHG

## 2021-06-03 VITALS
HEIGHT: 73 IN | BODY MASS INDEX: 30.65 KG/M2 | WEIGHT: 231.25 LBS | SYSTOLIC BLOOD PRESSURE: 98 MMHG | HEART RATE: 58 BPM | OXYGEN SATURATION: 98 % | DIASTOLIC BLOOD PRESSURE: 68 MMHG

## 2021-06-03 VITALS — HEIGHT: 73 IN | WEIGHT: 234 LBS | BODY MASS INDEX: 31.01 KG/M2

## 2021-06-03 VITALS
SYSTOLIC BLOOD PRESSURE: 118 MMHG | HEART RATE: 80 BPM | DIASTOLIC BLOOD PRESSURE: 70 MMHG | BODY MASS INDEX: 30.69 KG/M2 | RESPIRATION RATE: 16 BRPM | WEIGHT: 231.6 LBS | HEIGHT: 73 IN

## 2021-06-03 VITALS — BODY MASS INDEX: 30.38 KG/M2 | WEIGHT: 229.25 LBS | HEIGHT: 73 IN

## 2021-06-03 VITALS
RESPIRATION RATE: 15 BRPM | WEIGHT: 230 LBS | BODY MASS INDEX: 30.48 KG/M2 | TEMPERATURE: 97.9 F | HEIGHT: 73 IN | HEART RATE: 64 BPM | SYSTOLIC BLOOD PRESSURE: 138 MMHG | DIASTOLIC BLOOD PRESSURE: 83 MMHG

## 2021-06-03 VITALS — BODY MASS INDEX: 31.94 KG/M2 | WEIGHT: 241 LBS | HEIGHT: 73 IN

## 2021-06-03 VITALS — WEIGHT: 243 LBS | HEIGHT: 73 IN | BODY MASS INDEX: 32.2 KG/M2

## 2021-06-03 VITALS — BODY MASS INDEX: 31.93 KG/M2 | WEIGHT: 242 LBS

## 2021-06-03 VITALS — BODY MASS INDEX: 30.74 KG/M2 | WEIGHT: 233 LBS

## 2021-06-03 VITALS — WEIGHT: 236 LBS | BODY MASS INDEX: 31.14 KG/M2

## 2021-06-03 VITALS — BODY MASS INDEX: 31.01 KG/M2 | WEIGHT: 234 LBS | HEIGHT: 73 IN

## 2021-06-03 NOTE — TELEPHONE ENCOUNTER
Patient Returning Call  Reason for call:  Patient is returning call  Information relayed to patient:  Message below informing the patient he is due for a diabetic eye exam was relayed to the patient.   Patient was asked if he has already had this done in the past year?    Patient reported he does not believe he has had this done.     Patient was asked if he was in need of a referral to have this done?    Patient reported he does not need a referral, and already has a provider he sees for his eyes and will make an appointment.  Patient has additional questions:  No  If YES, what are your questions/concerns:  N/a  Okay to leave a detailed message?: No call back needed

## 2021-06-03 NOTE — TELEPHONE ENCOUNTER
Patient Returning Call  Reason for call:  Patient called back.  Information relayed to patient:  Informed of message listed below.  Patient states he has an eye appointment in the next couple of weeks.  Patient has additional questions:  No  If YES, what are your questions/concerns:    Okay to leave a detailed message?: No call back needed

## 2021-06-04 ENCOUNTER — COMMUNICATION - HEALTHEAST (OUTPATIENT)
Dept: CARDIOLOGY | Facility: CLINIC | Age: 71
End: 2021-06-04

## 2021-06-04 VITALS
BODY MASS INDEX: 31.54 KG/M2 | HEIGHT: 73 IN | HEART RATE: 74 BPM | SYSTOLIC BLOOD PRESSURE: 129 MMHG | WEIGHT: 238 LBS | OXYGEN SATURATION: 97 % | DIASTOLIC BLOOD PRESSURE: 64 MMHG

## 2021-06-04 VITALS
HEIGHT: 72 IN | HEART RATE: 75 BPM | WEIGHT: 233 LBS | SYSTOLIC BLOOD PRESSURE: 112 MMHG | DIASTOLIC BLOOD PRESSURE: 64 MMHG | BODY MASS INDEX: 31.56 KG/M2 | OXYGEN SATURATION: 96 %

## 2021-06-04 VITALS
SYSTOLIC BLOOD PRESSURE: 114 MMHG | HEIGHT: 73 IN | OXYGEN SATURATION: 94 % | WEIGHT: 240 LBS | DIASTOLIC BLOOD PRESSURE: 68 MMHG | BODY MASS INDEX: 31.81 KG/M2 | HEART RATE: 84 BPM

## 2021-06-04 VITALS
HEART RATE: 74 BPM | OXYGEN SATURATION: 95 % | SYSTOLIC BLOOD PRESSURE: 116 MMHG | WEIGHT: 239 LBS | BODY MASS INDEX: 31.68 KG/M2 | DIASTOLIC BLOOD PRESSURE: 76 MMHG | HEIGHT: 73 IN

## 2021-06-04 VITALS
OXYGEN SATURATION: 96 % | DIASTOLIC BLOOD PRESSURE: 62 MMHG | HEART RATE: 72 BPM | HEIGHT: 73 IN | SYSTOLIC BLOOD PRESSURE: 118 MMHG | WEIGHT: 238 LBS | BODY MASS INDEX: 31.54 KG/M2

## 2021-06-04 VITALS
WEIGHT: 240.13 LBS | RESPIRATION RATE: 16 BRPM | BODY MASS INDEX: 32.12 KG/M2 | SYSTOLIC BLOOD PRESSURE: 130 MMHG | HEART RATE: 66 BPM | OXYGEN SATURATION: 99 % | DIASTOLIC BLOOD PRESSURE: 86 MMHG

## 2021-06-04 VITALS
BODY MASS INDEX: 31.81 KG/M2 | SYSTOLIC BLOOD PRESSURE: 112 MMHG | OXYGEN SATURATION: 94 % | HEIGHT: 73 IN | DIASTOLIC BLOOD PRESSURE: 80 MMHG | RESPIRATION RATE: 16 BRPM | HEART RATE: 66 BPM | WEIGHT: 240 LBS

## 2021-06-04 VITALS
SYSTOLIC BLOOD PRESSURE: 108 MMHG | HEIGHT: 72 IN | WEIGHT: 244.2 LBS | DIASTOLIC BLOOD PRESSURE: 71 MMHG | BODY MASS INDEX: 33.08 KG/M2 | HEART RATE: 95 BPM | OXYGEN SATURATION: 95 %

## 2021-06-04 VITALS — BODY MASS INDEX: 30.92 KG/M2 | HEIGHT: 73 IN | WEIGHT: 233.3 LBS
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
## 2021-06-04 NOTE — PATIENT INSTRUCTIONS - HE
Nadir Cantu    It was a pleasure to see you at [facility] today.    No need to worry about palpitations, no evidence for significant arrhythmia on monitors  Okay to remain off metoprolol and losartan  Do not miss doses of Brilinta  Call if blood pressures run over 130/85  Favor delaying prostate biopsy until April 2020 potential risk of myocardial infarction with holding Brilinta.  Follow up appointment: Dr. Kraus as scheduled, Dr. Guerra in March 2020   Dr. Bowman as needed    Contact Felicia at [phone] with questions or concerns.    Leobardo Bowman MD

## 2021-06-04 NOTE — PROGRESS NOTES
LOCATION OF MASK FITTING: [facility]  REASON FOR MASK FITTING: WOULD LIKE TO TRY A NEW MASK  PATIENT TRIED ON THE FOLLOWING MASKS: BASHIR FX (MED PILLOWS) AND AIRFIT P10 (MED PILLOWS)    MASK AND SIZE SELECTED: BASHIR FX
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
## 2021-06-04 NOTE — PATIENT INSTRUCTIONS - HE
Please bring your machine, mask, hose and power cord on the next clinical visit with me. Thank you.

## 2021-06-04 NOTE — PROGRESS NOTES
Order for Durable Medical Equipment was processed and equipment ordered.     DME provider: [facility]    Date Faxed: 12/12/2019    Ordering Provider: Catarino Burnett DO    PAP Order Type: Settings/pressure change    Fax Number: IN HOUSE DME: KARYN

## 2021-06-05 VITALS
HEART RATE: 64 BPM | BODY MASS INDEX: 30.21 KG/M2 | SYSTOLIC BLOOD PRESSURE: 108 MMHG | WEIGHT: 229 LBS | RESPIRATION RATE: 16 BRPM | DIASTOLIC BLOOD PRESSURE: 72 MMHG

## 2021-06-05 VITALS
BODY MASS INDEX: 31.28 KG/M2 | HEIGHT: 73 IN | HEART RATE: 86 BPM | SYSTOLIC BLOOD PRESSURE: 100 MMHG | OXYGEN SATURATION: 96 % | WEIGHT: 236 LBS | DIASTOLIC BLOOD PRESSURE: 66 MMHG | RESPIRATION RATE: 20 BRPM

## 2021-06-05 VITALS — HEIGHT: 73 IN | BODY MASS INDEX: 31.28 KG/M2 | WEIGHT: 236 LBS
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
## 2021-06-06 NOTE — TELEPHONE ENCOUNTER
Please call the patient to find out if he feels the pressure is too high or too low. We can adjust it remotely. Thanks.

## 2021-06-06 NOTE — TELEPHONE ENCOUNTER
PATIENT REQUESTED PRESSURES CHANGED BACK TO 6 15.  PRESSURE CHANGE APPROVED VERBALLY PER DR. AYALA AND PRESSURES WERE CHANGED IN THE OFFICE TODAY TO 6 15.  PLEASE PUT WRITTEN ORDERS IN FOR THE CHANGE IN PRESSURES.

## 2021-06-06 NOTE — TELEPHONE ENCOUNTER
RN cannot approve Refill Request    RN can NOT refill this medication historical medication requested.       Mariposa Knutson, [facility] Triage/Med Refill 2/19/2020    Requested Prescriptions   Pending Prescriptions Disp Refills     azelastine (ASTELIN) 137 mcg (0.1 %) nasal spray 30 mL 5     Sig: SPRAY 1 SPRAY INTO EACH NOSTRIL TWICE A DAY       Nasal Spray Protocol Passed - 2/15/2020  1:35 PM        Passed - Patient has had office visit/physical in last 1 year     Last office visit with prescriber/PCP: 2/6/2020 Adolfo Kraus MD OR same dept: 2/6/2020 Adolfo Kraus MD OR same specialty: 2/6/2020 Adolfo Kraus MD Last physical: 6/27/2019 Last MTM visit: Visit date not found    Next visit within 3 mo: Visit date not found  Next physical within 3 mo: Visit date not found  Prescriber OR PCP: Adolfo Kraus MD  Last diagnosis associated with med order: There are no diagnoses linked to this encounter.
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
## 2021-06-06 NOTE — TELEPHONE ENCOUNTER
Dr Burnett,  Patient having problems with CPAP adjustment from yesterday in office.    Patient would like to come in to have it readjusted.    Thank you

## 2021-06-07 NOTE — PATIENT INSTRUCTIONS - HE
I'm glad to see you are doing well now    Now that the stents are almost a year old, let's continue ASA 81 mg indefintely, switch ticagrelor for clopidogrel after a 300mg once re-load,t hen 75mg daily. This will also continue indefinitely but at this point a year out okay to interrupt clopidogrel for prostate biopsy or other elective procedure on aspirin   - continue atorva 80, check cholesterol panel  - aggressive risk factor modification including healthy diet and exercise    For blood pressure control, let's add chlorthalidone, continue CPAP, weight loss      Follow Up Plan: Follow up in 1 year w/ an echocardiogram

## 2021-06-07 NOTE — PROGRESS NOTES
ROS was done over the phone and was positive for irregular heartbeat and easy bruising.  All other ROS was WNL.

## 2021-06-07 NOTE — TELEPHONE ENCOUNTER
Nadir called with complaint of having more and more shortness of breath, said he wakes up at night and can not catch his breath. Will give 40 mg of prednisone x 5 days per Dr. Correa, and transferred to scheduling to set up an appointment to talk with Dr. Arroyo. Instructed to call if breathing gets worse.

## 2021-06-07 NOTE — PATIENT INSTRUCTIONS - HE
1. Discontinue Arnuity  2. Start BREO 200/25 one puff daily , rinse your mouth after each use  3. Albuterol HFA two puffs every 4 hours and before activities as needed  4. Continue flonase two sprays each nostril daily  5. Continue astelin two sprays each nostril daily  6. Lansoprazole 1 tab twice a day (morning and supper)  7. Ranitidine before bedtime  8. Continue CPAP therapy   9. Increase activity as tolerated  10. Contact me in 3 to 4 weeks to let me know how are you doing.  11. Follow up in 6 months

## 2021-06-07 NOTE — PROGRESS NOTES
"Nadir Cantu is a 70 y.o. male who is being evaluated via a billable telephone visit.      The patient has been notified of following:     \"This telephone visit will be conducted via a call between you and your physician/provider. We have found that certain health care needs can be provided without the need for a physical exam.  This service lets us provide the care you need with a short phone conversation.  If a prescription is necessary we can send it directly to your pharmacy.  If lab work is needed we can place an order for that and you can then stop by our lab to have the test done at a later time.    Telephone visits are billed at different rates depending on your insurance coverage. During this emergency period, for some insurers they may be billed the same as an in-person visit.  Please reach out to your insurance provider with any questions.    If during the course of the call the physician/provider feels a telephone visit is not appropriate, you will not be charged for this service.\"    Patient has given verbal consent to a Telephone visit? Yes    [facility]    Additional provider notes: seep provider note  ACT:  In the past 4 weeks, how much of the time did your asthma keep you from getting as much done at work, school, or at home?: Most of the time  During the past 4 weeks, how often have you had shortness of breath?: More than once a day  During the past 4 weeks, how often did your asthma symptoms (wheezing, coughing, shortness of breath, chest tightness or pain) wake you up at night or earlier in the morning?: 2 or 3 nights a week  During the past 4 weeks, how often have you used your rescue inhaler or nebulizer medication (such as albuterol)?: 1 or 2 times per day  How would you rate your asthma control during the past 4 weeks?: Poorly controlled  ACT Total Score: (!) 9  In the past 12 months, have you visited the emergency room due to your asthma?: No  In " the past 12 months, have you been hospitalized due to your asthma?: No      Phone call duration: 20 minutes    Sri Tarango LPN
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
## 2021-06-07 NOTE — PROGRESS NOTES
DME Provider: [facility]  Date Faxed: 4/20/20  Ordering Provider: Dr. Arroyo  Equipment ordered: nebulizer

## 2021-06-08 NOTE — PROGRESS NOTES
Patient oxygen saturation on RA at rest is 95%.  Oxygen saturation after ambulating 300ft on RA is 96%.    Patient is ambulatory within his/her home.     ...........................Sy Maritza MCCARTHYA 1/16/2017 4:21 PM

## 2021-06-08 NOTE — PROGRESS NOTES
PULMONARY OUTPATIENT CONSULT NOTE    Assessment:   1. Dyspnea  Previous stress test was negative, recent echocardiogram was within normal limits.   Patient reports mild outdoors allergies, treated for allergic rhinitis.   States mild cough, discrete wheezing and occasional chest tightness with activities. Recent PFTs are within normal limits. Patient might have underlying asthma.   FENO in clinic was 27 , which is elevated and suggest inflammation in airway. There is no desaturation with activites.   Recommend a trial of ICS, continue albuterol HFA as needed and re-assess symptoms.   Deconditioning plays a role. Increase activity as tolerated.   Continue CPAP therapy for sleep apnea.   Patient is followed by cardiology, a chest CT scan was previously ordered, will follow results.   2. Asthma mild persistent   Start ICS, continue albuterol HFA as needed  3. STEVE on CPAP  4. GERD  5. Obesity     Plan:   1. Trial of ARNUITY one puff daily (supply were given for one month)   2. Albuterol HFA two puffs every 4 hours and before activities as needed  3. Lansoprazole 1 tab twice a day (morning and supper)  4. Ranitidine before bedtime  5. Increase physical activity  6. Will follow previously ordered CT scan   7. Resume CPAP therapy   8. Contact me in 3 weeks and let me know how are you doing  9. Follow up in 3 months     Thank you for this consultation.     Boy Jesus  Pulmonary / Critical Care  1/16/2017       CC:      Chief Complaint   Patient presents with     Consult     Shortness of Breath     go over PFT's, been going on for about a year, but has gotten worse in the last 2-3 months, also has some chest tightness       HPI:       Nadir Cantu is an 66 y.o. male who presents for evaluation of dyspnea.   Patient has history of allergic rhinitis, HTN, STEVE on CPAP, GERD.  Patient complaints of mild exertional dyspnea over the last year.  Able to walk a mile, but mild dyspnea. Denies chest pain. No  "swelling of LEs, orthopnea or PND.  Reports mild dry cough, chest tightness and occasional wheezes with activities.   Reports mild outdoors allergies, complaints of runny nose and nasal congestion. Uses nasal steroid, flonase and allegra. Has dog, cat, parrots since 1994.    He denies worsening shortness of breath when cleaning the litter of the pets.   Patient was recently evaluated by cardiology, had a negative stress test a year ago, normal echocardiogram this year, a chest CT scan was ordered, test was not done yet, per cardiology notes if persistent dyspnea without explanation, plan for coronary angiogram.   History of acid reflux, takes PPI and H2 blocker, recent esophagogram showed hiatal hernia and acid reflux.   Denies tobacco use in the past.   History of sleep apnea, on CPAP therapy, refresh in AM.     Past Medical History   Diagnosis Date     Anxiety      Chronic back pain      Diabetes mellitus      \"pre-DM\"     Hyperlipidemia      Obesity      STEVE (obstructive sleep apnea)      Reflux esophagitis      Salmonella dysentery 2014     Medications:     Prior to Admission medications    Medication Sig Start Date End Date Taking? Authorizing Provider   aluminum-magnesium hydroxide 200-200 mg/5 mL suspension Take 30 mL by mouth 4 (four) times a day as needed for indigestion.   Yes Andrew Saavedra MD   atorvastatin (LIPITOR) 40 MG tablet TAKE 1 TABLET BY MOUTH DAILY 9/5/16  Yes Andrew Saavedra MD   calcium carbonate-simethicone (GAS-X WITH MAALOX) 500-125 mg Chew Chew 1 tablet daily.   Yes Andrew Saavedra MD   calcium, as carbonate, (TUMS) 200 mg calcium (500 mg) chewable tablet Chew 1 tablet daily. Patient takes 4500 mg daily.   Yes Darrell GREGORIO MD   cyclobenzaprine (FLEXERIL) 5 MG tablet Take 5 mg by mouth 2 (two) times a day as needed for muscle spasms.   Yes PROVIDER, HISTORICAL   fexofenadine (ALLEGRA) 180 MG tablet Take 180 mg by mouth daily as needed.   Yes Andrew Saavedra MD   fluticasone (FLONASE) 50 " "mcg/actuation nasal spray INHALE 1 TO 2 SPRAYS INTO EACH NOSTRIL ONCE DAILY 9/19/16  Yes Andrew Saavedra MD   lansoprazole (PREVACID) 30 MG capsule TAKE 1 CAPSULE BY MOUTH TWICE DAILY 7/1/16  Yes RICHAR MUSA   lansoprazole (PREVACID) 30 MG capsule TAKE 1 CAPSULE BY MOUTH TWICE DAILY 1/11/17  Yes Andrew Saavedra MD   LORazepam (ATIVAN) 1 MG tablet TAKE 1 TABLET (1 MG TOTAL) BY MOUTH AS NEEDED FOR ANXIETY. 11/16/16  Yes Andrew Saavedra MD   losartan (COZAAR) 25 MG tablet Take 1 tablet (25 mg total) by mouth daily. 12/9/16  Yes Andrew Saavedra MD   methylcellulose (CITRUCEL) 500 mg Tab Take 500 mg by mouth daily. Take 6 tablets by mouth daily.   Yes Andrew Saavedra MD   metroNIDAZOLE (METROGEL) 1 % gel Apply 1 application topically daily. Apply sparingly to affected area(s) once daily.   Yes Andrew Saavedra MD   ranitidine (ZANTAC) 150 MG tablet Take 1 tablet (150 mg total) by mouth 2 (two) times a day. 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     Social History Main Topics     Smoking status: Former Smoker     Types: Cigarettes     Smokeless tobacco: Not on file     Alcohol use No     Drug use: No     Sexual activity: Yes     Partners: Female     Other Topics Concern     Not on file     Social History Narrative     Family History   Problem Relation Age of Onset     Heart disease Father      Heart disease Sister      Review of Systems  A 12 point comprehensive review of systems was negative except as noted.        Objective:     Vitals:    01/16/17 1518   BP: 116/74   Pulse: 96   Resp: 20   SpO2: 93%   Weight: (!) 266 lb (120.7 kg)   Height: 6' 1\" (1.854 m)   Oxygen saturation on RA at rest is 95%.  Oxygen saturation after ambulating 300ft on RA is 96%    Gen: obese, awake, alert, no " distress  HEENT: pink conjunctiva, moist mucosa, Mallampati II/IV  Neck: no thyromegaly, masses or JVD  Lungs: clear  CV: regular, no murmurs or gallops appreciated  Abdomen: soft, NT, BS wnl  Ext: no edema  Neuro: CN II-XII intact, non focal      Diagnostic tests:   PFTs (1/16/2017)  FEV1/FVC is 90% and is normal.  FEV1 is 92% predicted and is normal.  FVC is 79% predicted and reduced.  There was no improvement in spirometry after a single inhaled dose of bronchodilator.  TLC is 97% predicted and is normal.  RV is 138% predicted and is increased.  DLCO is 96% predicted and is normal.    Esophagogram   1. Moderate sized sliding hiatal hernia with moderate gastroesophageal reflux to the mid esophageal level.  2. Extensive tertiary contractions mid and distal esophagus. No evidence for fixed stricture or ulceration.  3. Stomach and duodenum appear to be within normal limits.  4. There appears to be a somewhat prominent cricopharyngeus muscle with the cervical esophagus otherwise normal.    Stress test (2/18/2016)  Exercise stress nuclear study is negative for inducible myocardial ischemia or infarction, despite the chest discomfort reported by the patient. EF 70%    Echocardiogram (1/10/2017)    Left ventricle ejection fraction is normal. The estimated left ventricular ejection fraction is 75%.    no significant valvular abnormalities    Lung bases of previous abdomen CT scan done on 8/11/2014)  No pulmonary infiltrates

## 2021-06-08 NOTE — PROGRESS NOTES
Patient instructed in use of Arnuity ellipta inhaler 100mcg.  Patient able to use without any difficulty.  Return demo and first dose given in clinic.  Samples given. Patient instructed to rinse mouth out after each use.  Instructed to call after 3 weeks to let us know if it is working or him and prescription can be sent to his pharmacy.

## 2021-06-09 ENCOUNTER — AMBULATORY - HEALTHEAST (OUTPATIENT)
Dept: PULMONOLOGY | Facility: OTHER | Age: 71
End: 2021-06-09

## 2021-06-09 DIAGNOSIS — J45.41 MODERATE PERSISTENT ASTHMA WITH EXACERBATION: ICD-10-CM

## 2021-06-09 NOTE — TELEPHONE ENCOUNTER
Refill Approved    Rx renewed per Medication Renewal Policy. Medication was last renewed on 7/31/19, last OV 2/26/20.    Mercedes Ly, [facility] Triage/Med Refill 6/20/2020     Requested Prescriptions   Pending Prescriptions Disp Refills     atorvastatin (LIPITOR) 80 MG tablet [Pharmacy Med Name: ATORVASTATIN 80 MG TABLET] 90 tablet 3     Sig: TAKE 1 TABLET BY MOUTH EVERY DAY       Statins Refill Protocol (Hmg CoA Reductase Inhibitors) Passed - 6/19/2020  1:26 PM        Passed - PCP or prescribing provider visit in past 12 months      Last office visit with prescriber/PCP: 2/6/2020 Adolfo Kraus MD OR same dept: 2/6/2020 Adolfo Kraus MD OR same specialty: 2/6/2020 Adolfo Kraus MD  Last physical: 6/27/2019 Last MTM visit: Visit date not found   Next visit within 3 mo: Visit date not found  Next physical within 3 mo: Visit date not found  Prescriber OR PCP: Adolfo Kraus MD  Last diagnosis associated with med order: 1. Dyslipidemia, goal LDL below 70  - atorvastatin (LIPITOR) 80 MG tablet [Pharmacy Med Name: ATORVASTATIN 80 MG TABLET]; TAKE 1 TABLET BY MOUTH EVERY DAY  Dispense: 90 tablet; Refill: 3    If protocol passes may refill for 12 months if within 3 months of last provider visit (or a total of 15 months).

## 2021-06-09 NOTE — PROGRESS NOTES
[facility] Follow Up Note  Assessment/Plan  1. Type 2 diabetes mellitus without complication     2. Essential hypertension     3. Chronic pain due to trauma     4. Back pain, chronic  Ambulatory referral to Interventional Radiology   5. Fatigue  Myasthenia Gravis (MG) Evaluation, With MuSK Reflex    Comprehensive Metabolic Panel    HM2(CBC w/o Differential)    Thyroid Cascade    Ferritin    Iron and Transferrin Iron Binding Capacity    Erythrocyte Sedimentation Rate    C-Reactive Protein    Electrophoresis, Protein, Serum, Cascade   6. Dermatitis     7. Mixed Hyperlipoproteinemia     8. Carotid artery disease  CANCELED: US Carotid Bilateral   9. Tachycardia     10. Obstructive Sleep Apnea         Patient Instructions   1. Medications were reviewed and medication refills completed if requested.   A corrected Medication List is listed below on this After Visit    Summary.   New Medications, Refilled medications or Discontinued medications are also listed below.      2. Immunizations were reviewed and updated as necessary.   All up to date  3. If labs were done today, they will either be mailed to you or released to My Chart online if that has been activated.  4. See Dr. Claire for follow-up and possible CT coronary angiogram  It is important to follow up to be sure he does not have underlying coronary artery disease as a cause of his lightheadedness or fatigue.  Also important to follow-up with his event monitor to be sure he is not having any arrhythmias..   5. We discussed his fatigue and decreased exercise tolerance in some detail and will do some metabolic screening including a matter of metabolic blood test plus screening for myasthenia gravis.  6. It is time for repeat carotid ultrasound which we will schedule today.  We know that he had previous carotid artery disease but I don't think his current symptoms are likely due to carotid disease.  7.  He had an epidural steroid injection of his back and  is still having back problems associated request and we will set him up for a facet joint injection of his back which is done sporadically over the years with good results.  8.  Follow-up with pulmonology regarding his asthma.  We do not think this is a major component of his overall symptoms but it is a point that needs to be followed up on and stabilized.  9.  He has already seen sleep medicine and they do not feel that he has significant sleep apnea so therefore he has abandon his CPAP machine.  There does not seem to be any correlation between abandoning his sleep machine and his symptoms.    MORE THAN 45 MINUTES WAS SPENT WITH THE PATIENT TODAY OF WHICH GREATER THAN 50% WAS SPENT IN COUNSELING AND COORDINATION OF CARE -  DISCUSSING THE AFOREMENTIONED DIAGNOSES, TREATMENT, AND PLAN.           Body mass index is 34.7 kg/(m^2).    Andrew Saavedra MD  Internal Medicine & Travel Medicine  [place] [id number]  Voice: [phone]  Fax: [phone]    +++++++++++++++++++++++++++++++++++++++    Nadir Cantu   67 y.o. male    Date of Visit: 4/6/2017    Chief Complaint   Patient presents with     Hypertension     The patient is seen today with his wife Gali Peck present who helps with the history and the planning.  Subjective  1. Health Maintenance  - immunization and colonoscopy are up-to-date.    2.  Profound fatigue and lightheadedness - patient recently retired still teaches part-time.  He looks on this as a good change in his life but that does seem to be some correlation that in recent months she has had much more fatigued.  Perhaps part of that could be due to the fact that now that he is retired he has started morbid exercise program.  He states he does water aerobics and he could do water aerobics all day and he never gets tired in the pool.  On dry land he states that he has very easy fatigability and sometimes he gets lightheaded and sometimes he feels  weak.  There is a clear correlation between land exercises and getting extremely tired and fatigued.  He has seen a variety of providers including sleep medicine and pulmonary and cardiology.  Cardiology feels that he probably has a significant component of deconditioning.  We discussed his fatigue and decreased exercise tolerance in some detail and will do some metabolic screening including a matter of metabolic blood test plus screening for myasthenia gravis.    3.  Episodic subjective tachycardia  -patient gives a classic history of paroxysmal tachycardia but has never been captured on tape.  The present time he is wearing an event monitor for 2 weeks and perhaps we'll need to wear for 2 weeks more.    4.  Mild asthma - he is seeing Dr. Jesus in pulmonary for his shortness of breath and wheezing and has mild asthma.  He is using a rescue inhaler only and may on follow-up require a maintenance inhaler.      5.  Obesity and deconditioning with episodic chest tightness - he is in the process of being worked up by cardiology to be sure that he just has deconditioning and that he is not having ischemia.  Cardiologist recommended a CT angiogram on follow-up and so he needs to proceed with that.    6.  Obstructive sleep apnea - she has seen Dr. Jenkins in the sleep medicine clinic and has been determined that his sleep apnea is not that bad.  Therefore since he does not tolerate CPAP.  He stopped it and there is no ill effects apparent.        7.  Chronic low back pain - this been a long-term issue for perhaps 10 years.  He was originally followed to [facility] for this.  He gets episodic epidural steroid injections which she had recently.  He also gets episodic facet joint injections which he like to schedule.  His back pain has been much more problematic but this correlates with his USP and his increased exercise program.     ROS A comprehensive review of systems was performed and was negative other than the  "problems noted above.    Medications, allergies, and problem list were reviewed and updated    Past Medical History:   Diagnosis Date     Anxiety      Chronic back pain      Diabetes mellitus     \"pre-DM\"     Hyperlipidemia      Obesity      STEVE (obstructive sleep apnea)      Reflux esophagitis      Salmonella dysentery 2014     Past Surgical History:   Procedure Laterality Date     CAROTID ENDARTERECTOMY  2013     CATARACT OS  09/21/2014     AZ APPENDECTOMY      Description: Appendectomy;  Recorded: 05/22/2009;     AZ BIOPSY OF SKIN LESION      Description: Biopsy Skin;  Recorded: 05/22/2009;     AZ LAP,CHOLECYSTECTOMY      Description: Cholecystectomy Laparoscopic;  Recorded: 02/14/2011;     Social History     Social History     Marital status:      Spouse name: N/A     Number of children: N/A     Years of education: N/A     Occupational History     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[employer]     Social History Main Topics     Smoking status: Former Smoker     Types: Cigarettes     Smokeless tobacco: Never Used     Alcohol use No     Drug use: No     Sexual activity: Yes     Partners: Female     Other Topics Concern     Not on file     Social History Narrative     Family History   Problem Relation Age of Onset     Heart disease Father      Heart disease Sister        Current Outpatient Prescriptions   Medication Sig Dispense Refill     albuterol (PROVENTIL HFA;VENTOLIN HFA) 90 mcg/actuation inhaler Inhale 2 puffs every 6 (six) hours as needed for wheezing or shortness of breath. 1 Inhaler 12     aluminum-magnesium hydroxide 200-200 mg/5 mL suspension Take 30 mL by mouth 4 (four) times a day as needed for indigestion.       atorvastatin (LIPITOR) 40 MG tablet TAKE 1 TABLET BY MOUTH DAILY 90 tablet 2     calcium carbonate-simethicone (GAS-X WITH MAALOX) 500-125 mg Chew Chew 1 tablet daily.       calcium, as carbonate, (TUMS) 200 mg calcium (500 mg) chewable tablet Chew 1 tablet daily. Patient " takes 4500 mg daily.       cyclobenzaprine (FLEXERIL) 5 MG tablet Take 5 mg by mouth 2 (two) times a day as needed for muscle spasms.       fexofenadine (ALLEGRA) 180 MG tablet Take 180 mg by mouth daily as needed.       fluticasone (FLONASE) 50 mcg/actuation nasal spray INHALE 1 TO 2 SPRAYS INTO EACH NOSTRIL ONCE DAILY 48 g 2     lansoprazole (PREVACID) 30 MG capsule TAKE 1 CAPSULE BY MOUTH TWICE DAILY  7     lansoprazole (PREVACID) 30 MG capsule TAKE 1 CAPSULE BY MOUTH TWICE DAILY 60 capsule 10     LORazepam (ATIVAN) 1 MG tablet TAKE 1 TABLET (1 MG TOTAL) BY MOUTH AS NEEDED FOR ANXIETY. 90 tablet 2     losartan (COZAAR) 25 MG tablet Take 3 pills daily 90 tablet 12     methylcellulose (CITRUCEL) 500 mg Tab Take 500 mg by mouth daily. Take 6 tablets by mouth daily.       metroNIDAZOLE (METROGEL) 1 % gel Apply 1 application topically daily. Apply sparingly to affected area(s) once daily.       ranitidine (ZANTAC) 150 MG tablet TAKE 1 TABLET BY MOUTH TWICE A DAY 60 tablet 11     triamcinolone (KENALOG) 0.1 % cream Apply to rash twice daily 60 g 1     No current facility-administered medications for this visit.        Allergies   Allergen Reactions     Asa-Acetaminophen-Caff-Buffers Nausea And Vomiting     Aspirin      Niacin      Tetanus And Diphtheria Toxoids, Adsorbed, Adult Angiodema     Tongue swells up     Tetanus Toxoid, Adsorbed Swelling     Tetanus Vaccines And Toxoid        Exam  Vitals:    04/06/17 1406   BP: 112/68   Pulse: 88   Resp: 16   Temp: 97.5  F (36.4  C)     The patient is well-groomed and well-dressed alert and oriented ×3.   Head: Negative  HEENT: Normal  Lungs: Clear to auscultation without rales or wheezing.    Cardiac: No S3 murmur or ectopy.  Abdomen: Soft bowel sounds normal.  No significant local tenderness.  Extremities: No peripheral edema.  Pulses are normal and symmetrical.  Neuro: Gait and mentation normal. Cranial Nerves intact.            Andrew Saavedra MD
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
## 2021-06-11 NOTE — TELEPHONE ENCOUNTER
Called pt and discussed recommendations from Dr. Anna. Pt agrees. Pt will cancel prostate procedure. Will have scheduling setup procedure for next week. Patient verbalizes understanding and agrees with plan. No further questions or concerns at this time.

## 2021-06-11 NOTE — TELEPHONE ENCOUNTER
----- Message from Estela Franz sent at 9/16/2020 11:50 AM CDT -----  Regarding: Call needed for authorization  Nadir Dougherty is scheduled for a procedure on 9/17/2020. [employer] is responsible for the medical review for his authorization. They are requesting a call because they have further questions after receiving the clinicals. Please call [phone] select option #2 and the reference number is [id number].    Thank you,    Estela Franz Financial Securing Representative    [facility]  Financial Securing Center  [address] [place] [id number]  [email or web address]  [email or web address]   Office: [phone]  Fax [phone]  Employed by [facility]

## 2021-06-11 NOTE — PROGRESS NOTES
PULMONARY OUTPATIENT FOLLOW UP NOTE    Assessment:     1. Asthma mild persistent  Respiratory symptoms had improved.   Patient stopped using ICS.   Continue to hold ICS and re-assess symptoms. Continue albuterol HFA as needed  2. Allergic rhinitis   Nasal steroid spray  3. STEVE on CPAP  4. GERD  Raise the head of the bed, avoid eating close to bedtime at least 3 hours.   Continue PPI BID and H2 blocker at night as needed  5. Obesity     Plan:   1. Trial to hold Arnuity and evaluate symptoms, resume if cough, wheezes  2. Albuterol HFA two puffs every 4 hours and before activities as needed  3. Continue flonase two sprays each nostril daily  4. Continue astelin two sprays each nostril daily  5. Lansoprazole 1 tab twice a day (morning and supper)  6. Ranitidine before bedtime  7. Referral to physical therapy  8. Continue CPAP therapy   9. Pt is followed by Dr. Valdivia  10. Follow up in 6 months      Boy Jesus  Pulmonary / Critical Care  6/29/2017       CC:      Chief Complaint   Patient presents with     Follow Up     6 mo f/u-pt states that he is still having issues with his breathing-he says that he is still waking up with chest pain and a rapid heart beat-pt states that his heart checked out good-pt states that this happens to him 4-5 times per week-pt states that during the day his oxygen stays between 91-94 during the night, when he lays down his oxygen level will go down to 90-91 when he wakes up with the pulsation his heart rate  and oxygen 95-98-but if he is sleeping without the pulsation his oxygen is      Shortness of Breath     is at 87-88 and when he uses his rescue inhaler it doesn't change his oxygen level at all-pt states that it feels like his CPAP is fighting him not helping him       HPI:       Nadir Cantu is an 67 y.o. male who presents for evaluation of dyspnea.   Patient has history of allergic rhinitis, asthma, HTN, STEVE on CPAP, GERD.  Reports doing ok since last visit,  "denies activity limitation, able to walk a mille. Denies cough or wheezes.   Stopped using ICS.   Denies chest pain. No swelling of LEs, orthopnea or PND.  Uses nasal steroid spray for history of allergic rhinitis. Reports mild nasal congestion, no significant postnasal drip.   Has dog, cat, parrots since 1994.    History of acid reflux, takes PPI and H2 blocker, recentesophagogram showed hiatal hernia and acid reflux.  Denies tobacco use in the past.   History of sleep apnea, on CPAP therapy, refresh in AM. Patient is followed by Dr. Valdivia.     Past Medical History:   Diagnosis Date     Anxiety      Asthma      Chronic back pain      Diabetes mellitus     \"pre-DM\"     Hyperlipidemia      Hypertension      Obesity      STEVE (obstructive sleep apnea)      Reflux esophagitis      Salmonella dysentery 2014     Medications:     Prior to Admission medications    Medication Sig Start Date End Date Taking? Authorizing Provider   aluminum-magnesium hydroxide 200-200 mg/5 mL suspension Take 30 mL by mouth 4 (four) times a day as needed for indigestion.   Yes Andrew Saavedra MD   atorvastatin (LIPITOR) 40 MG tablet TAKE 1 TABLET BY MOUTH DAILY 9/5/16  Yes Andrew Saavedra MD   calcium carbonate-simethicone (GAS-X WITH MAALOX) 500-125 mg Chew Chew 1 tablet daily.   Yes Andrew Saavedra MD   calcium, as carbonate, (TUMS) 200 mg calcium (500 mg) chewable tablet Chew 1 tablet daily. Patient takes 4500 mg daily.   Yes Darrell GREGORIO MD   cyclobenzaprine (FLEXERIL) 5 MG tablet Take 5 mg by mouth 2 (two) times a day as needed for muscle spasms.   Yes PROVIDER, HISTORICAL   fexofenadine (ALLEGRA) 180 MG tablet Take 180 mg by mouth daily as needed.   Yes Andrew Saavedra MD   fluticasone (FLONASE) 50 mcg/actuation nasal spray INHALE 1 TO 2 SPRAYS INTO EACH NOSTRIL ONCE DAILY 9/19/16  Yes Andrew Saavedra MD   lansoprazole (PREVACID) 30 MG capsule TAKE 1 CAPSULE BY MOUTH TWICE DAILY 7/1/16  Yes PROVIDER, HISTORICAL   lansoprazole (PREVACID) 30 MG " capsule TAKE 1 CAPSULE BY MOUTH TWICE DAILY 1/11/17  Yes Andrew Saavedra MD   LORazepam (ATIVAN) 1 MG tablet TAKE 1 TABLET (1 MG TOTAL) BY MOUTH AS NEEDED FOR ANXIETY. 11/16/16  Yes Andrew Saavedra MD   losartan (COZAAR) 25 MG tablet Take 1 tablet (25 mg total) by mouth daily. 12/9/16  Yes Andrew Saavedra MD   methylcellulose (CITRUCEL) 500 mg Tab Take 500 mg by mouth daily. Take 6 tablets by mouth daily.   Yes Andrew Saavedra MD   metroNIDAZOLE (METROGEL) 1 % gel Apply 1 application topically daily. Apply sparingly to affected area(s) once daily.   Yes Andrew Saavedra MD   ranitidine (ZANTAC) 150 MG tablet Take 1 tablet (150 mg total) by mouth 2 (two) times a day. 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     Social History Main Topics     Smoking status: Former Smoker     Types: Cigarettes     Quit date: 4/25/1969     Smokeless tobacco: Never Used     Alcohol use No     Drug use: No     Sexual activity: Yes     Partners: Female     Other Topics Concern     Not on file     Social History Narrative     Family History   Problem Relation Age of Onset     Heart disease Father      Heart disease Sister      Review of Systems  A 12 point comprehensive review of systems was negative except as noted.        Objective:     Vitals:    06/29/17 1117   BP: 122/82   Pulse: 80   Resp: 16   SpO2: 95%   Weight: (!) 261 lb 6.4 oz (118.6 kg)   Oxygen saturation on RA at rest is 95%.  Oxygen saturation after ambulating 300ft on RA is 96%    Gen: obese, awake, alert, no distress  HEENT: pink conjunctiva, moist mucosa, Mallampati II/IV  Neck: no thyromegaly, masses or JVD  Lungs: clear  CV: regular, no murmurs or gallops appreciated  Abdomen: soft, NT, BS wnl  Ext: no edema  Neuro: CN II-XII intact, non focal       Diagnostic tests:   PFTs (1/16/2017)  FEV1/FVC is 90% and is normal.  FEV1 is 92% predicted and is normal.  FVC is 79% predicted and reduced.  There was no improvement in spirometry after a single inhaled dose of bronchodilator.  TLC is 97% predicted and is normal.  RV is 138% predicted and is increased.  DLCO is 96% predicted and is normal.    Esophagogram   1. Moderate sized sliding hiatal hernia with moderate gastroesophageal reflux to the mid esophageal level.  2. Extensive tertiary contractions mid and distal esophagus. No evidence for fixed stricture or ulceration.  3. Stomach and duodenum appear to be within normal limits.  4. There appears to be a somewhat prominent cricopharyngeus muscle with the cervical esophagus otherwise normal.    Stress test (2/18/2016)  Exercise stress nuclear study is negative for inducible myocardial ischemia or infarction, despite the chest discomfort reported by the patient. EF 70%    Echocardiogram (1/10/2017)    Left ventricle ejection fraction is normal. The estimated left ventricular ejection fraction is 75%.    no significant valvular abnormalities    Lung bases of previous abdomen CT scan done on 8/11/2014)  No pulmonary infiltrates

## 2021-06-11 NOTE — PROGRESS NOTES
"Dear Dr. Andrew Saavedra MD  [address] [place] [address],    Thank you for the opportunity to participate in the care of Nadir Cantu.     He is a 67 y.o. y/o male patient who comes to the sleep medicine clinic for follow up.    We saw him in clinic for the last time in December of 2016 in order to reestablish care for his sleep apnea. At that time, he was having difficulties tolerating CPAP and we changed his device from APAP mode to 8 cwp fixed pressure based on his sleep test.   Since our last visit, he explored other causes of nighttime symptoms and did not return to clinic until today.     He reports that he has been struggling to tolerate his device.     Now, he is waking up in the middle of the night with gasping episodes and a racing heart rate.    Residual AHI: 21.8  Compliance: usage 6/30 days with limited usage and less than 4 hours all nights      Past Medical History:   Diagnosis Date     Anxiety      Asthma      Chronic back pain      Diabetes mellitus     \"pre-DM\"     Hyperlipidemia      Hypertension      Obesity      STEVE (obstructive sleep apnea)      Reflux esophagitis      Salmonella dysentery 2014       Past Surgical History:   Procedure Laterality Date     CAROTID ENDARTERECTOMY  2013     CATARACT OS  09/21/2014     OR APPENDECTOMY      Description: Appendectomy;  Recorded: 05/22/2009;     OR BIOPSY OF SKIN LESION      Description: Biopsy Skin;  Recorded: 05/22/2009;     OR LAP,CHOLECYSTECTOMY      Description: Cholecystectomy Laparoscopic;  Recorded: 02/14/2011;       Social History     Social History     Marital status:      Spouse name: N/A     Number of children: N/A     Years of education: N/A     Occupational History     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[employer]     Social History Main Topics     Smoking status: Former Smoker     Types: Cigarettes     Quit date: 4/25/1969     Smokeless tobacco: Never Used     Alcohol use No     Drug use: No     " Sexual activity: Yes     Partners: Female     Other Topics Concern     Not on file     Social History Narrative       Review of Systems:  General: No weight gain, no weight loss  Eyes: No vision changes  ENT: No hearing changes  Cardio: No chest pain, no nocturnal dyspnea  Respiratory: No shortness of breath, no cough  Gastrointestinal: No diarrhea, no constipation  Genitourinary: No excessive urination  Tegumentary: No rashes  Neurological: No seizures, no loss of consciousness  Endo: No heat or cold intolerance.    Current Outpatient Prescriptions   Medication Sig Dispense Refill     albuterol (PROVENTIL HFA;VENTOLIN HFA) 90 mcg/actuation inhaler Inhale 2 puffs every 6 (six) hours as needed for wheezing or shortness of breath. 1 Inhaler 12     aluminum-magnesium hydroxide 200-200 mg/5 mL suspension Take 30 mL by mouth 4 (four) times a day as needed for indigestion.       atorvastatin (LIPITOR) 40 MG tablet Take 1 tablet (40 mg total) by mouth daily. 90 tablet 0     azelastine (ASTELIN) 137 mcg (0.1 %) nasal spray Use in each nostril twice daily as directed 30 mL 12     calcium carbonate-simethicone (GAS-X WITH MAALOX) 500-125 mg Chew Chew 1 tablet daily.       calcium, as carbonate, (TUMS) 200 mg calcium (500 mg) chewable tablet Chew 1 tablet daily. Patient takes 4500 mg daily.       fluticasone (FLONASE) 50 mcg/actuation nasal spray INHALE 1 TO 2 SPRAYS INTO EACH NOSTRIL ONCE DAILY 48 g 2     lansoprazole (PREVACID) 30 MG capsule TAKE 1 CAPSULE BY MOUTH TWICE DAILY 60 capsule 10     LORazepam (ATIVAN) 1 MG tablet TAKE 1 TABLET (1 MG TOTAL) BY MOUTH AS NEEDED FOR ANXIETY. 90 tablet 2     losartan (COZAAR) 50 MG tablet Take 1.5 tablets daily 45 tablet 11     methylcellulose (CITRUCEL) 500 mg Tab Take 500 mg by mouth daily. Take 6 tablets by mouth daily.       metroNIDAZOLE (METROGEL) 1 % gel Apply 1 application topically daily. Apply sparingly to affected area(s) once daily.       ranitidine (ZANTAC) 150 MG tablet  "TAKE 1 TABLET BY MOUTH TWICE A DAY 60 tablet 11     triamcinolone (KENALOG) 0.1 % cream Apply to rash twice daily 60 g 1     No current facility-administered medications for this visit.        Allergies   Allergen Reactions     Asa-Acetaminophen-Caff-Buffers Nausea And Vomiting     Aspirin      Niacin      Tetanus And Diphtheria Toxoids, Adsorbed, Adult Angiodema     Tongue swells up     Tetanus Toxoid, Adsorbed Swelling     Tetanus Vaccines And Toxoid        Physical Exam:  Pulse 84  Ht 6' 1\" (1.854 m)  Wt (!) 264 lb (119.7 kg)  SpO2 94%  BMI 34.83 kg/m2  BMI:Body mass index is 34.83 kg/(m^2).   GEN: NAD, obese  Neurological: Alert, oriented to time, place, and person.  Psych: normal mood, normal affect     Labs/Studies:     I reviewed the efficacy and compliance report from his device.      Lab Results   Component Value Date    WBC 7.8 04/06/2017    HGB 14.6 04/06/2017    HCT 43.5 04/06/2017    MCV 88 04/06/2017     04/06/2017         Chemistry        Component Value Date/Time     04/06/2017 1452    K 4.6 04/06/2017 1452     04/06/2017 1452    CO2 24 04/06/2017 1452    BUN 20 04/06/2017 1452    CREATININE 0.92 04/06/2017 1452     04/06/2017 1452        Component Value Date/Time    CALCIUM 9.9 04/06/2017 1452    ALKPHOS 50 04/06/2017 1452    AST 18 04/06/2017 1452    ALT 25 04/06/2017 1452    BILITOT 0.4 04/06/2017 1452              Assessment and Plan:  In summary Nadir Cantu is a 67 y.o. year old male who is here for follow up.    1. Obstructive Sleep Apnea  His residual AHI is too elevated with CPAP of 8 cwp.  He is having gasping episodes in the middle of the night but it is unclear if this is related to STEVE or acid reflux (or some other etiology)  Recommend an overnight PSG.  Stop CPAP for now.  We will see if he is a candidate for a MAD.     Patient verbalized understanding of these issues, agrees with the plan and all questions were answered today. Patient was given an " opportuntity to voice any other symptoms or concerns not listed above. Patient did not have any other symptoms or concerns.      MD LISA Eden Board Certified in Internal Medicine and Sleep Medicine  [facility].    We spent a total of 40 minutes during this visit and more than 50% of the time was used for counseling.

## 2021-06-11 NOTE — TELEPHONE ENCOUNTER
From: Tonny Anna MD  Sent: 9/8/2020   9:25 AM CDT  To: Claudia Duarte RN  Subject: FW: Updates about my health                      Claudia,    Based on recurrence of his symptoms, I would offer angiography to re-look at the LAD, but also re-evaluate the RPLV.    Thx!  Tonny  ----- Message -----  From: Nadir Cantu  Sent: 9/4/2020   8:09 PM CDT  To: Tonny Anna MD  Subject: Updates about my health                          I hope all is well with you.  During the last couple of months my breathing has become more difficult.  Much like it did before the stents. (My wife noted.)  Needing to wear a mast at times compounds this issue. Having more frequent chest pains in the 1-5 range.  Have lost some weight. Down to 228. Aside from mild gardening I have not been able to do a lot of exercising.  Any suggestions.  Nadir Cantu  [phone]  [email or web address]

## 2021-06-11 NOTE — TELEPHONE ENCOUNTER
From: Flower Gonzalez  Sent: 9/11/2020   9:40 AM CDT  To: Claudia Duarte RN    CORS POSS PCI WITH FADI / CLARENCE     9/17/2020 6:30 AM ADMIT     H&P: 9/11 VIRTUAL WITH [facility] [place] [facility]     NO ALERTS     COVID SCHEDULED: 9/14 AT [facility]     Thanks!      Mary Ellen

## 2021-06-11 NOTE — TELEPHONE ENCOUNTER
Refill Approved    Rx renewed per Medication Renewal Policy. Medication was last renewed on 10/7/19.    Mariposa Knutson, [facility] Triage/Med Refill 9/22/2020     Requested Prescriptions   Pending Prescriptions Disp Refills     tamsulosin (FLOMAX) 0.4 mg cap [Pharmacy Med Name: TAMSULOSIN HCL 0.4 MG CAPSULE] 180 capsule 1     Sig: TAKE 2 CAPSULES (0.8 MG TOTAL) BY MOUTH DAILY AFTER SUPPER.       Alfuzosin/Tamsulosin/Silodosin Refill Protocol  Passed - 9/20/2020  4:33 PM        Passed - PCP or prescribing provider visit in past 12 months       Last office visit with prescriber/PCP: 2/6/2020 Adolfo Kraus MD OR same dept: 2/6/2020 Adolfo Kraus MD OR same specialty: 2/6/2020 Adolfo Kraus MD  Last physical: 8/6/2020 Last MTM visit: Visit date not found   Next visit within 3 mo: Visit date not found  Next physical within 3 mo: Visit date not found  Prescriber OR PCP: Adolfo Kraus MD  Last diagnosis associated with med order: 1. Dysuria  - tamsulosin (FLOMAX) 0.4 mg cap [Pharmacy Med Name: TAMSULOSIN HCL 0.4 MG CAPSULE]; TAKE 2 CAPSULES (0.8 MG TOTAL) BY MOUTH DAILY AFTER SUPPER.  Dispense: 180 capsule; Refill: 1    2. Panic anxiety syndrome  - escitalopram oxalate (LEXAPRO) 10 MG tablet [Pharmacy Med Name: ESCITALOPRAM 10 MG TABLET]; TAKE 1 TABLET BY MOUTH EVERY DAY  Dispense: 90 tablet; Refill: 3    If protocol passes may refill for 12 months if within 3 months of last provider visit (or a total of 15 months).                escitalopram oxalate (LEXAPRO) 10 MG tablet [Pharmacy Med Name: ESCITALOPRAM 10 MG TABLET] 90 tablet 3     Sig: TAKE 1 TABLET BY MOUTH EVERY DAY       SSRI Refill Protocol  Passed - 9/20/2020  4:33 PM        Passed - PCP or prescribing provider visit in last year     Last office visit with prescriber/PCP: 2/6/2020 Adolfo Kraus MD OR same dept: 2/6/2020 Adolfo Kraus MD OR same specialty: 2/6/2020 Adolfo Kraus MD  Last physical: 8/6/2020 Last MTM visit: Visit date not  found   Next visit within 3 mo: Visit date not found  Next physical within 3 mo: Visit date not found  Prescriber OR PCP: Adolfo Kraus MD  Last diagnosis associated with med order: 1. Dysuria  - tamsulosin (FLOMAX) 0.4 mg cap [Pharmacy Med Name: TAMSULOSIN HCL 0.4 MG CAPSULE]; TAKE 2 CAPSULES (0.8 MG TOTAL) BY MOUTH DAILY AFTER SUPPER.  Dispense: 180 capsule; Refill: 1    2. Panic anxiety syndrome  - escitalopram oxalate (LEXAPRO) 10 MG tablet [Pharmacy Med Name: ESCITALOPRAM 10 MG TABLET]; TAKE 1 TABLET BY MOUTH EVERY DAY  Dispense: 90 tablet; Refill: 3    If protocol passes may refill for 12 months if within 3 months of last provider visit (or a total of 15 months).

## 2021-06-11 NOTE — PROGRESS NOTES
Review of systems done with patient over the phone. Positive for chest pain. All other review of systems within normal limits.

## 2021-06-11 NOTE — TELEPHONE ENCOUNTER
Called Antoni to further discuss further clinical information to approve pts scheduled coronary angiogram. Case has been approved.     Auth#: [id number]  9/11/2020 through 3/15/2021

## 2021-06-11 NOTE — TELEPHONE ENCOUNTER
From: Tonny Anna MD  Sent: 9/9/2020   3:42 PM CDT  To: Claudia Duarte RN    It seems to me that his cardiac complaints would be more concerning based on description than his prostate issues. I think that if he is open to it, I would have him undergo angio +/- PCI with any one of my partners. Otherwise, also happy to have him seen in [facility].    Thx!  Tonny

## 2021-06-11 NOTE — TELEPHONE ENCOUNTER
Nadir VEE Cantu  [address] [facility] [address]  [phone] (home)     Procedure cardiologist:  Dr. Trevizo or Dr. Hough  PCP:  Adolfo Kraus MD  H&P completed by:  9/11 Carol video visit  Admit date 9/17  Arrival time:  0630  Anticoagulation: none  Previous PCI: 4/19/2019  Bypass Grafts: No  Renal Issues: No  Diabetic?: Yes  Device?: No    Covid-19 Test Date: 9/14 Patient understands after they get their test, they are to self-isolate at home until their procedure. They will only be called back if there results are positive or if there is an issue with not receiving their results back by 4:30pm the day before their scheduled procedure.    Angiogram Teaching    Reason for Visit:  Telephone call to discuss pre-procedure education in preparation for: Coronary angiogram possible PCI    Procedure Prep:  Primary Cardiologist note dated: 3/25/20  EKG results obtained, dated: Morning of procedure  Hemogram results obtained: Morning of procedure  Basic Metabolic Panel results obtained: Morning of procedure  Lipid Profile results obtained: 8/6/20    Patient Education  Patient states understanding of procedure and risks and agrees to proceed.    Pre-procedure instructions  Patient instructed to be NPO after midnight.  Patient instructed to shower the evening before or the morning of the procedure.  Leave all valuables at home (jewlery, rings, watches, large amounts of money).  Patient understands there is only 1 visitors allowed in the hospital at this time due to COVID-19 Pandemic. Must remain the same person who must wear a mask at all times. This visitor is limited to [facility] area. Visitor might be asked to leave if there are too many visitors in [facility].  Patient instructed to arrange for transportation home following procedure from a responsible family member of friend. No driving for at least 24 hours post-procedure.  Patient instructed to have a responsible adult with them for 24 hours  post-procedure.  Post-procedure follow up process.  Conscious sedation discussed.    Pre-procedure medication instructions  Patient instructed on antiplatelet medication.  Continue medications as scheduled, with a small amount of water on the day of the procedure unless indicated.  Patient instructed to take (4)  81mg of Aspirin am of procedure: Yes  Other medication: instructed to only take aspirin, chlorthalidone, clopidogrel, gabapentin, omeprazole a.m. of the procedure.    *PATIENTS RECORDS AVAILABLE IN [facility] UNLESS OTHERWISE INDICATED*      Patient Active Problem List   Diagnosis     Irritable bowel syndrome     Chronic Reflux Esophagitis     Obesity     Palpitations     Chronic pain due to trauma     Cervicalgia     Type 2 diabetes mellitus without complication (H)     Hearing Loss     Peripheral vascular disease (H)     Coronary artery disease due to lipid rich plaque     Essential hypertension     Obstructive sleep apnea syndrome     Hyperlipidemia LDL goal <70     Chronic prescription benzodiazepine use     Atypical chest pain     Benign prostatic hyperplasia with lower urinary tract symptoms     Exertional chest pain     Exertional dyspnea       Current Outpatient Medications   Medication Sig Dispense Refill     aspirin (ASPIRIN LOW DOSE) 81 MG EC tablet Take 81 mg by mouth daily.              atorvastatin (LIPITOR) 80 MG tablet Take 1 tablet (80 mg total) by mouth daily. 90 tablet 2     azelastine (ASTELIN) 137 mcg (0.1 %) nasal spray SPRAY 1 SPRAY INTO EACH NOSTRIL TWICE A DAY 30 mL 5     chlorthalidone (HYGROTEN) 25 MG tablet Take 1 tablet (25 mg total) by mouth daily. 30 tablet 12     cholecalciferol, vitamin D3, 1,000 unit (25 mcg) tablet Take 1,000 Units by mouth daily.       clopidogreL (PLAVIX) 300 mg Tab Take 1 tablet (300 mg total) by mouth once for 1 dose. 1 tablet 0     clopidogreL (PLAVIX) 75 mg tablet Take 1 tablet (75 mg total) by mouth daily. 30 tablet 12     coenzyme Q10 (COQ-10) 100 mg  capsule Take 1 capsule (100 mg total) by mouth daily.  0     escitalopram oxalate (LEXAPRO) 10 MG tablet Take 1 tablet (10 mg total) by mouth daily. 90 tablet 3     fexofenadine (ALLEGRA ALLERGY) 180 MG tablet Take 180 mg by mouth as needed.       fluticasone furoate-vilanteroL (BREO ELLIPTA) 200-25 mcg/dose DsDv inhaler Inhale 1 puff daily. 30 each 12     fluticasone propionate (FLONASE) 50 mcg/actuation nasal spray INHALE 1 TO 2 SPRAYS INTO EACH NOSTRIL ONCE DAILY 48 g 2     gabapentin (NEURONTIN) 100 MG capsule Take 100 mg by mouth 2 (two) times a day.   1     LORazepam (ATIVAN) 1 MG tablet TAKE 1 TABLET (1 MG TOTAL) BY MOUTH AS NEEDED FOR ANXIETY. 60 tablet 2     magnesium chloride (SLOW-MAG) 64 mg TbEC delayed-release tablet Take 1 tablet (64 mg total) by mouth daily.       metFORMIN (GLUCOPHAGE XR) 500 MG 24 hr tablet Take 1 tablet (500 mg total) by mouth daily with supper. 30 tablet 3     nitroglycerin (NITROSTAT) 0.3 MG SL tablet Place 1 tablet (0.3 mg total) under the tongue every 5 (five) minutes as needed. 1 Bottle 5     omeprazole (PRILOSEC) 20 MG capsule Take 1 capsule (20 mg total) by mouth 2 (two) times a day before meals. 180 capsule 3     simethicone (GAS-X) 80 MG chewable tablet Chew 80 mg every 6 (six) hours as needed for flatulence.              tamsulosin (FLOMAX) 0.4 mg cap Take 1 capsule (0.4 mg total) by mouth daily after supper. 30 capsule 5     No current facility-administered medications for this visit.        Allergies   Allergen Reactions     Aspirin Other (See Comments)     Upset stomach - can tolerate the low dose ASA     Niacin Other (See Comments)     Upset stomach     Tetanus And Diphtheria Toxoids, Adsorbed, Adult Angioedema     Tongue swells up         MAI Duarte

## 2021-06-11 NOTE — TELEPHONE ENCOUNTER
Pt called and discussed recommendation to proceed with coronary angiogram. Pt states he is scheduled for a prostate procedure for BPH is scheduled with [facility] next week. Pt would need to be off clopidogrel and ASA for a week prior. This procedure has been on hold since pts last PCI in 2019 and also d/t covid pandemic.     Dr. Anna,    Do you want pt to hold off on prostate procedure or can they proceed (stopping asa/clopidogrel) for a week? Pt states the procedure is not urgent but more for his comfort as this as been put on hold for over a year.     FYI: Next available angio is 10/5.

## 2021-06-11 NOTE — PROGRESS NOTES
[facility] Follow Up Note  Assessment/Plan  1. Type 2 diabetes mellitus without complication  Glycosylated Hemoglobin A1c   2. Obstructive Sleep Apnea     3. Chronic Reflux Esophagitis     4. Tachycardia     5. Obesity     6. Lower Back Pain     7. SOBOE (shortness of breath on exertion)           Patient Instructions   1. Medications were reviewed and medication refills completed if requested.   A corrected Medication List is listed below on this After Visit    Summary.   New Medications, Refilled medications or Discontinued medications are also listed below.      2. Immunizations were reviewed and updated as necessary.   All up to date  3. If labs were done today, they will either be mailed to you or released to My Chart online if that has been activated.    4. Tylenol(Aceteminophen) can safely be taken up to a (short term) maximum daily dose of 3000 mg as needed to treat pain or fever. (Long term maximal dose should be 2000 mg)   ES Tylenol 500 mg x 6 = 3000 mg   Reg Tylenol 325 mg x 9 = 3000 mg  5. See Dr. Zhang in Pulmonary again - he ended up seeing pulmonary following extensive cardiac workup finding no explanation for shortness of breath.  Pulmonary function tests were normal but he was felt to have mild asthma.  He was given a trial of a maintenance inhaler and is not sure if it helps.  He needs to follow-up with pulmonary as they had wanted to do so.  6. See Dr. Luis Carlos Valdivia again and let him now that your nocturnal O2 sats are 88 -it sounds like the pulmonary people were concerned about his sleep apnea with the sleep medicine Dr. Pena gave up on his CPAP because he had trouble tolerating it.  Now that he is documented O2 sats of 80 8 at night perhaps sleep medicine people will presently had harder to treat his sleep apnea which again if properly treated may help his fatigue and exercise tolerance and overall well-being.  7. Tramadol trial for pain - I would like him to try tramadol  again because we could just take a tramadol may be several days a week on the days that he exercises I think that he could get going again breakthrough the pain cycle so that he can walk.  I would like to see him walk with his wife for 1/4 mile and 1/2 mile and then three-quarter mile and then a mild etc.    8. Start walking program as tolerated  9. MRI of Lumbar spine - as part of [place] evaluation  10. Rx Azelastine nasal spray       MORE THAN 40 MINUTES WAS SPENT WITH THE PATIENT TODAY OF WHICH GREATER THAN 50% WAS SPENT IN COUNSELING AND COORDINATION OF CARE -  DISCUSSING THE AFOREMENTIONED DIAGNOSES, TREATMENT, AND PLAN.       Body mass index is 34.73 kg/(m^2).    Andrew Saavedra MD  Internal Medicine [facility] [place] [address]  Voice: [phone]  Fax: [phone]    +++++++++++++++++++++++++++++++++++++++    Nadir Cantu   67 y.o. male    Date of Visit: 6/20/2017    Chief Complaint   Patient presents with     Back Pain     Breathing Problem     Headache     Subjective  1. Health Maintenance  -immunizations are up-to-date.  Colonoscopy is up-to-date    2.  Acute on chronic low back pain -patient's had intractable back pain for 10 or more years.  He was followed at the [facility] and he has had a variety of epidural steroid injections or other injections.  The steroid injection started at the [facility] and then had been more recently done at [facility] radiology facility.  He had an injection earlier this spring which gave him only brief relief of his pain.  It is his back pain that prevents him from doing any land exercises including even minimal amounts of walking.  He has not had an MRI for 5 or more years.  Starting to get pain into his legs and so I think he probably should get repeat imaging.  The issue is whether we should do an MRI here or if he wants to go back to mail and he indicates he like to be seen at the [facility] again.  Based on his  insurance we know that it is in his network.    3.  Intractable GERD and dyspepsia -patient has had intractable bulging and GERD also for many years, 10 or more years.  The present time he takes lansoprazole twice daily Zantac twice daily as well as Maalox as well as Tums as well as Gas-X.  He has been seen by [place] [facility] on many occasions most recently last year.  They tried him on Dexilant which was not any better and so he went back to lansoprazole.    4.  Dyspnea on exertion - he ended up seeing pulmonary following extensive cardiac workup finding no explanation for shortness of breath.  Pulmonary function tests were normal but he was felt to have mild asthma.  He was given a trial of a maintenance inhaler and is not sure if it helps.  He needs to follow-up with pulmonary as they had wanted to do so.  He thinks that the albuterol rescue inhaler may help a little bit.  Recently he got an oximeter from a daughter and uses it at various times.  He states that at rest sometimes his oxygen sat will go down to 93% and at night will go down to 88%.    Checked his oxygen saturation today and I got 96% at rest.  We got up and walked about 200 feet and his oxygen saturation actually went from 96-97 dipped briefly to 95 will immediately snapback to 96%.  I am unable to document any rest or exercise desaturation.      5.  Sleep apnea and nocturnal hypoxia -it seems like the pulmonologist was more concerned about his obstructive sleep apnea but the sleep medicine people felt it was mild enough he could simply abandon his CPAP machine.  I would still wonder if he would benefit from it and now that he is got an oximeter and is showing overnight hypoxia perhaps he should revisit the sleep medicine clinic.  They had wanted to see him in any event.    6.  Obesity and deconditioning -the patient tries to exercise in a swimming pool 3 times a week.  Due to back pain and shortness of breath is unable to exercise on  "land.  I think that his shortness of breath and his tachycardia and even his back pain would be improved if he was able to obtain a greater level of physical fitness.  The Catch-22 is that he has back pains we cannot do anything.  He cannot take NSAIDs because of his dyspepsia and GERD.  Tylenol up to 4 tablets per day is really of minimal benefit.  A year or 2 ago he tried tramadol for neck pain is not certain if he had major side effects from it.  Generally he cannot take narcotic pain pills because of side effects.  I would like him to try tramadol again because we could just take a tramadol may be several days a week on the days that he exercises I think that he could get going again breakthrough the pain cycle so that he can walk.  I would like to see him walk with his wife for 1/4 mile and 1/2 mile and then three-quarter mile and then a mild etc.    7.  Type 2 diabetes mellitus -this has not been problematic but he is due for repeat A1c.    ROS A comprehensive review of systems was performed and was negative other than the problems noted above.    Medications, allergies, and problem list were reviewed and updated    Past Medical History:   Diagnosis Date     Anxiety      Asthma      Chronic back pain      Diabetes mellitus     \"pre-DM\"     Hyperlipidemia      Hypertension      Obesity      STEVE (obstructive sleep apnea)      Reflux esophagitis      Salmonella dysentery 2014     Past Surgical History:   Procedure Laterality Date     CAROTID ENDARTERECTOMY  2013     CATARACT OS  09/21/2014     GA APPENDECTOMY      Description: Appendectomy;  Recorded: 05/22/2009;     GA BIOPSY OF SKIN LESION      Description: Biopsy Skin;  Recorded: 05/22/2009;     GA LAP,CHOLECYSTECTOMY      Description: Cholecystectomy Laparoscopic;  Recorded: 02/14/2011;     Social History     Social History     Marital status:      Spouse name: N/A     Number of children: N/A     Years of education: N/A     Occupational History     " 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[employer]     Social History Main Topics     Smoking status: Former Smoker     Types: Cigarettes     Quit date: 4/25/1969     Smokeless tobacco: Never Used     Alcohol use No     Drug use: No     Sexual activity: Yes     Partners: Female     Other Topics Concern     Not on file     Social History Narrative     Family History   Problem Relation Age of Onset     Heart disease Father      Heart disease Sister        Current Outpatient Prescriptions   Medication Sig Dispense Refill     albuterol (PROVENTIL HFA;VENTOLIN HFA) 90 mcg/actuation inhaler Inhale 2 puffs every 6 (six) hours as needed for wheezing or shortness of breath. 1 Inhaler 12     aluminum-magnesium hydroxide 200-200 mg/5 mL suspension Take 30 mL by mouth 4 (four) times a day as needed for indigestion.       atorvastatin (LIPITOR) 40 MG tablet Take 1 tablet (40 mg total) by mouth daily. 90 tablet 0     calcium carbonate-simethicone (GAS-X WITH MAALOX) 500-125 mg Chew Chew 1 tablet daily.       calcium, as carbonate, (TUMS) 200 mg calcium (500 mg) chewable tablet Chew 1 tablet daily. Patient takes 4500 mg daily.       cyclobenzaprine (FLEXERIL) 5 MG tablet Take 5 mg by mouth 2 (two) times a day as needed for muscle spasms.       fexofenadine (ALLEGRA) 180 MG tablet Take 180 mg by mouth daily as needed.       fluticasone (FLONASE) 50 mcg/actuation nasal spray INHALE 1 TO 2 SPRAYS INTO EACH NOSTRIL ONCE DAILY 48 g 2     fluticasone furoate (ARNUITY ELLIPTA) 200 mcg/actuation DsDv Inhale 1 puff Daily at 8:00 am.. 30 each 6     lansoprazole (PREVACID) 30 MG capsule TAKE 1 CAPSULE BY MOUTH TWICE DAILY 60 capsule 10     LORazepam (ATIVAN) 1 MG tablet TAKE 1 TABLET (1 MG TOTAL) BY MOUTH AS NEEDED FOR ANXIETY. 90 tablet 2     losartan (COZAAR) 50 MG tablet Take 1.5 tablets daily 45 tablet 11     methylcellulose (CITRUCEL) 500 mg Tab Take 500 mg by mouth daily. Take 6 tablets by mouth daily.       metroNIDAZOLE  (METROGEL) 1 % gel Apply 1 application topically daily. Apply sparingly to affected area(s) once daily.       ranitidine (ZANTAC) 150 MG tablet TAKE 1 TABLET BY MOUTH TWICE A DAY 60 tablet 11     triamcinolone (KENALOG) 0.1 % cream Apply to rash twice daily 60 g 1     azelastine (ASTELIN) 137 mcg (0.1 %) nasal spray Use in each nostril twice daily as directed 30 mL 12     traMADol (ULTRAM) 50 mg tablet Take 1 tablet (50 mg total) by mouth every 8 (eight) hours as needed for pain. 60 tablet 0     No current facility-administered medications for this visit.        Allergies   Allergen Reactions     Asa-Acetaminophen-Caff-Buffers Nausea And Vomiting     Aspirin      Niacin      Tetanus And Diphtheria Toxoids, Adsorbed, Adult Angiodema     Tongue swells up     Tetanus Toxoid, Adsorbed Swelling     Tetanus Vaccines And Toxoid        Exam  Vitals:    06/20/17 1015   BP: 122/74   Pulse: 90     Oxygen duration is 96% at rest and essentially the same with exercise.  As he walked around the nursing station about 200 feet his O2 sat actually went up to 97% while we are walking hip 95% on he sat back down again and then immediately within seconds SnapBack to 96%.  I am unable to document any rest or exercise hypoxia.  The patient is well-groomed and well-dressed alert and oriented ×3.   Head: Negative  HEENT: Normal  Lungs: Clear to auscultation without rales or wheezing.      Abdomen: Soft bowel sounds normal.  No significant local tenderness.  Extremities: No peripheral edema.  Pulses are normal and symmetrical.  Neuro: Gait and mentation normal. Cranial Nerves intact.            Andrew Saavedra MD

## 2021-06-13 NOTE — PROGRESS NOTES
"[facility] Daily Progress     Patient Name: Nadir Cantu  Date: 10/18/2017  Visit #: 3  PTA visit #:    Date of evaluation: 2017  Referral Diagnosis: Physical deconditioning  Referring provider: Boy Pickett  Visit Diagnosis:     ICD-10-CM    1. Decreased strength, endurance, and mobility R53.1     Z74.09    2. Muscle weakness (generalized) M62.81    3. Chronic bilateral low back pain without sciatica M54.5     G89.29    4. Gait instability R26.81      Initial Assessment:   Nadir Cantu is a 67 y.o. male  Returns to therapy with complains of deconditioning, difficulty breathing at times and chronic low back pain. Patient demonstrates improvements in testing from last evaluation to today but still test below age gender normal in gait speed and sit to stands. Patient has mild decrease in lumbar motion with pain at end range and proximal hip weakness and core weakness. Patient will benefit from skilled PT intervention to increase strength and endurance, decrease back pain and regain functional mobility    Assessment:     HEP/POC compliance is  good .  Patient demonstrates understanding/independence with home program.  Patient is benefitting from skilled physical therapy and is making steady progress toward functional goals.  Patient is appropriate to continue with skilled physical therapy intervention, as indicated by initial plan of care.    Goal Status:  No Data Recorded  No Data Recorded    Plan / Patient Education:     Continue with initial plan of care.  Progress with home program as tolerated.  Plan for next visit: continue with  NuStep vs Treadmill, LE strengthening exercises, core strengthening, endurance training, give more ideas for pool exercises   Subjective:     Pain Ratin   He had the nerve root injections/\"burning the nerve\" last week and he has been having more pain and tightness in the low back and into the upper back pain from this.   He has not been to the " pool since 1.5 weeks ago due to trip to [place] and procedure. Has not been able to walk for longer periods of time due to the pain, has not been able to do the exercises. Soreness, pain, weakness in the legs.   He states the pain in the neck has been increasing as well, has not had this assessed at [place], did at [facility].     Objective:     Therapeutic Exercise:   - NuStep WL 5 x 10 minutes cues to keep spm >60; average spm 55   - Treadmill walking 1.6 mph x 5 minutes   - supine TA sets 5 sec hold x 10 reps   - PPT 10 sec hold x 5 reps   - Bridges 5 sec hold x 10 reps   - SLR x 10 reps each side     Reviewed HEP today with standing hip abduction, extension, TA set with bands, reviewed as needed      HEP:   Sit to stands  TA set with band as above   Standing hip abduction and extension added for land and pool exercise     Treatment Today   10/18/2017  TREATMENT MINUTES COMMENTS   Evaluation     Self-care/ Home management     Manual therapy     Neuromuscular Re-education     Therapeutic Activity     Therapeutic Exercises 53 See above   Significant time spent discussing trip to [place] and procedure he had done. Discussed HEP from last session, pool exercises and progression. Discussed using NuStep or treadmill at the [facility]    Gait training     Modality__________________                Total 53    Blank areas are intentional and mean the treatment did not include these items.       Ladonna Bear, PT, DPT   10/18/2017

## 2021-06-13 NOTE — PROGRESS NOTES
[facility] Daily Progress     Patient Name: Nadir Cantu  Date: 9/26/2017  Visit #: 2  PTA visit #:    Date of evaluation: 9/22/2017  Referral Diagnosis: Physical deconditioning  Referring provider: Boy Pickett  Visit Diagnosis:     ICD-10-CM    1. Decreased strength, endurance, and mobility Z74.09    2. Muscle weakness (generalized) M62.81    3. Chronic bilateral low back pain without sciatica M54.5     G89.29      Initial Assessment:   Nadir Cantu is a 67 y.o. male  Returns to therapy with complains of deconditioning, difficulty breathing at times and chronic low back pain. Patient demonstrates improvements in testing from last evaluation to today but still test below age gender normal in gait speed and sit to stands. Patient has mild decrease in lumbar motion with pain at end range and proximal hip weakness and core weakness. Patient will benefit from skilled PT intervention to increase strength and endurance, decrease back pain and regain functional mobility    Assessment:     HEP/POC compliance is  good .  Patient demonstrates understanding/independence with home program.  Patient is benefitting from skilled physical therapy and is making steady progress toward functional goals.  Patient is appropriate to continue with skilled physical therapy intervention, as indicated by initial plan of care.    Goal Status:  Pt. will be independent with home exercise program in : 6 weeks  Pt. will be able to walk : 30 minutes;with less difficulty;for exercise/recreation;for community mobility;in 6 weeks  Pt will: increase 30 sec sit to stands to >12 reps for decrease in risk for falls and increase in strength and endurance  Pt will: increase distance on 2 minute walk test >185 meters keeping O2 stats >95% and HR less than 110 bpm in 6 weeks    Plan / Patient Education:     Continue with initial plan of care.  Progress with home program as tolerated.  Plan for next visit: continue with   NuStep vs Treadmill, LE strengthening exercises, core strengthening, endurance training, give more ideas for pool exercises   Subjective:     Pain Ratin  Patient reports he was at the pool and tried some of the exercise we discussed. There were a lot of kids in the pool this time so he was not able to do the kick board laps. Pain increased today a little due to standing and yury tomatoes over the weekend.     Objective:     Therapeutic Exercise:   - NuStep WL 5 x 5 minutes cues to keep spm >60; average spm 65   - Band TA set pulls to center x 20, oblique x 15 reps each side L3 band   - sit to stands x 10 reps with cues to engage core to decrease muscle spasms.    - standing hip abduction with cues to keep toes forward, decrease ROM to prevent lateral trunk lean to opposite side.  X 15 reps B   - standing hip extension with cues to decrease fwd bend at hips and increase gluteal  activation x 15 reps B     HEP:   Sit to stands  TA set with band as above   Standing hip abduction and extension added for land and pool exercise     Treatment Today   2017  TREATMENT MINUTES COMMENTS   Evaluation     Self-care/ Home management     Manual therapy     Neuromuscular Re-education     Therapeutic Activity     Therapeutic Exercises 30  NuStep WL 5 x 5 min keep spm >60   See above   Time spent discussing doing something every day, if go to pool then do not need to do exercises as above unless less painful, if nt going to the pool try to do the exercises and walk, also discussed cardio machines (NuStep, recumbent Bike and seated ellipticals) at the [facility]   Gait training     Modality__________________                Total     Blank areas are intentional and mean the treatment did not include these items.       Ladonna Bear  2017

## 2021-06-13 NOTE — PROGRESS NOTES
[facility] Discharge Summary  Patient Name: Nadir Cantu  Date: 9/14/2017  Referral Diagnosis: Physical deconditioning  Referring provider: Boy Pickett  Visit Diagnosis:   1. Decreased strength, endurance, and mobility     2. Muscle weakness (generalized)     3. Gait instability         Goals:  Pt. will be independent with home exercise program in : 6 weeks  Pt. will be able to walk : 30 minutes;with less difficulty;for exercise/recreation;for community mobility;in 6 weeks  Pt will: increase 30 sec sit to stands to >12 reps for decrease in risk for falls and increase in strength and endurance  Pt will: increase distance on 2 minute walk test >185 meters keeping O2 stats >95% and HR less than 110 bpm in 6 weeks    Patient was seen for 1 visits from 7/18/17 to 7/18/17 with 0 missed appointments.  The patient discontinued therapy, did not return.  Patient has follow ups with his spine MDs at [place], they held PT for a time and he will now be dsicharged from this episode of care. Patient has been in communication with this therapist and will returning to therapy but will have new orders from spine MD for back and deconditioning. this episode will now be discharged as there has been as lapse in time for initial evaluation and a change in status with the addition of back referral.     Therapy will be discontinued at this time.  The patient will need a new referral to resume.    Thank you for your referral.  Ladonna Bear  9/14/2017  11:07 AM

## 2021-06-13 NOTE — PROGRESS NOTES
"Dear Andrew Saavedra MD  [address] [place] [address],    Thank you for the opportunity to participate in the care of Nadir Cantu.     He is a 67 y.o. y/o male patient who comes to the sleep medicine clinic for follow up.    We had an extensive conversation to review the results of his sleep study.    The overnight polysomnography was completed with a digital sleep system using the international 10-20 electrode placement for recording EEG, EOG, EMG from chin, ECG, respiratory effort, oximetry, body position, airflow, nasal pressure, snoring sound, pulse rate and limb movement channels.    The study was completed as a split night study.    1. During the diagnostic portion of the study respiratory monitoring showed moderate obstructive sleep apnea/hypopnea (AHI=22.5).  2. A trial of nasal CPAP was initiated given the severity of sleep-disordered breathing and CPAP of 6 cwp was effective at eliminating obstructive events.    3. Even though respiratory events were eliminated with CPAP of 6 cwp, there was evidence of decreased respiratory effort that was associated with mild oxygen saturation fluctuations.     Past Medical History:   Diagnosis Date     Anxiety      Asthma      Chronic back pain      Diabetes mellitus     \"pre-DM\"     Hyperlipidemia      Hypertension      Obesity      STEVE (obstructive sleep apnea)      Reflux esophagitis      Salmonella dysentery 2014       Past Surgical History:   Procedure Laterality Date     CAROTID ENDARTERECTOMY  2013     CATARACT OS  09/21/2014     HI APPENDECTOMY      Description: Appendectomy;  Recorded: 05/22/2009;     HI BIOPSY OF SKIN LESION      Description: Biopsy Skin;  Recorded: 05/22/2009;     HI LAP,CHOLECYSTECTOMY      Description: Cholecystectomy Laparoscopic;  Recorded: 02/14/2011;       Social History     Social History     Marital status:      Spouse name: N/A     Number of children: N/A     Years of education: N/A     Occupational History "     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[employer]     Social History Main Topics     Smoking status: Former Smoker     Types: Cigarettes     Quit date: 4/25/1969     Smokeless tobacco: Never Used     Alcohol use No     Drug use: No     Sexual activity: Yes     Partners: Female     Other Topics Concern     Not on file     Social History Narrative       Family History   Problem Relation Age of Onset     Heart disease Father      Heart disease Sister          Review of Systems:  General: No weight gain, no weight loss  Eyes: No vision changes  ENT: No hearing changes  Cardio: No chest pain, no nocturnal dyspnea  Respiratory: No shortness of breath, no cough  Gastrointestinal: No diarrhea, no constipation  Genitourinary: No excessive urination  Tegumentary: No rashes  Neurological: No seizures, no loss of consciousness  Endo: No heat or cold intolerance.    Current Outpatient Prescriptions   Medication Sig Dispense Refill     albuterol (PROVENTIL HFA;VENTOLIN HFA) 90 mcg/actuation inhaler Inhale 2 puffs every 6 (six) hours as needed for wheezing or shortness of breath. 1 Inhaler 12     aluminum-magnesium hydroxide 200-200 mg/5 mL suspension Take 30 mL by mouth 4 (four) times a day as needed for indigestion.       atorvastatin (LIPITOR) 40 MG tablet Take 1 tablet (40 mg total) by mouth daily. 90 tablet 0     azelastine (ASTELIN) 137 mcg (0.1 %) nasal spray Use in each nostril twice daily as directed 30 mL 12     calcium carbonate-simethicone (GAS-X WITH MAALOX) 500-125 mg Chew Chew 1 tablet daily.       calcium, as carbonate, (TUMS) 200 mg calcium (500 mg) chewable tablet Chew 1 tablet daily. Patient takes 4500 mg daily.       fluticasone (FLONASE) 50 mcg/actuation nasal spray INHALE 1 TO 2 SPRAYS INTO EACH NOSTRIL ONCE DAILY 48 g 2     lansoprazole (PREVACID) 30 MG capsule TAKE 1 CAPSULE BY MOUTH TWICE DAILY 60 capsule 10     LORazepam (ATIVAN) 1 MG tablet TAKE 1 TABLET (1 MG TOTAL) BY MOUTH AS NEEDED FOR  "ANXIETY. 90 tablet 2     losartan (COZAAR) 50 MG tablet Take 1.5 tablets daily 45 tablet 11     methylcellulose (CITRUCEL) 500 mg Tab Take 500 mg by mouth daily. Take 6 tablets by mouth daily.       metroNIDAZOLE (METROGEL) 1 % gel Apply 1 application topically daily. Apply sparingly to affected area(s) once daily.       ranitidine (ZANTAC) 150 MG tablet TAKE 1 TABLET BY MOUTH TWICE A DAY 60 tablet 11     triamcinolone (KENALOG) 0.1 % cream Apply to rash twice daily 60 g 1     No current facility-administered medications for this visit.        Allergies   Allergen Reactions     Asa-Acetaminophen-Caff-Buffers Nausea And Vomiting     Aspirin      Niacin      Tetanus And Diphtheria Toxoids, Adsorbed, Adult Angiodema     Tongue swells up     Tetanus Toxoid, Adsorbed Swelling     Tetanus Vaccines And Toxoid        Physical Exam:  Pulse 79  Resp 14  Ht 6' 1\" (1.854 m)  Wt (!) 264 lb (119.7 kg)  SpO2 96%  BMI 34.83 kg/m2  BMI:Body mass index is 34.83 kg/(m^2).   GEN: NAD, obese  Neurological: Alert, oriented to time, place, and person.  Psych: normal mood, normal affect     Labs/Studies:  - We reviewed the results of the overnight PSG as described on the HPI.     Lab Results   Component Value Date    WBC 7.8 04/06/2017    HGB 14.6 04/06/2017    HCT 43.5 04/06/2017    MCV 88 04/06/2017     04/06/2017         Chemistry        Component Value Date/Time     04/06/2017 1452    K 4.6 04/06/2017 1452     04/06/2017 1452    CO2 24 04/06/2017 1452    BUN 20 04/06/2017 1452    CREATININE 0.92 04/06/2017 1452     04/06/2017 1452        Component Value Date/Time    CALCIUM 9.9 04/06/2017 1452    ALKPHOS 50 04/06/2017 1452    AST 18 04/06/2017 1452    ALT 25 04/06/2017 1452    BILITOT 0.4 04/06/2017 1452             Lab Results   Component Value Date    FERRITIN 114 04/06/2017       Lab Results   Component Value Date    HGBA1C 7.1 (H) 06/20/2017       Assessment and Plan:  In summary Nadir Cantu is " a 67 y.o. year old male here for follow up.  1. Obstructive Sleep Apnea  We had an extensive conversation to review the results of his sleep study.  I will change his device to a fixed pressure of 6 cwp since he already has a cpap device.  I will write a prescription for supplies and he would like to switch DMEs.  If CPAP is associated with central events, we can consider a MAD.     Patient verbalized understanding of these issues, agrees with the plan and all questions were answered today. Patient was given an opportuntity to voice any other symptoms or concerns not listed above. Patient did not have any other symptoms or concerns.      Patient told to return in 3 months.   Luis Carlos Valdivia MD  [facility] Board Certified in Internal Medicine and Sleep Medicine  [facility].

## 2021-06-14 NOTE — PROGRESS NOTES
[facility] Discharge Summary  Patient Name: Nadir Cantu  Date: 4/3/2018  Date of evaluation: 9/22/2017  Referral Diagnosis: Physical deconditioning  Referring provider: Boy Pickett*    Visit Diagnosis:   1. Decreased strength, endurance, and mobility     2. Muscle weakness (generalized)     3. Chronic bilateral low back pain without sciatica     4. Gait instability         Goals:  Pt. will be independent with home exercise program in : 6 weeks  Pt. will be able to walk : 30 minutes;with less difficulty;for exercise/recreation;for community mobility;in 6 weeks  Pt will: increase 30 sec sit to stands to >12 reps for decrease in risk for falls and increase in strength and endurance  Pt will: increase distance on 2 minute walk test >185 meters keeping O2 stats >95% and HR less than 110 bpm in 6 weeks     Patient was seen for 4 visits from 9/22/2017 to 11/24/2018 with multiple cancelled missed appointments.  The patient discontinued therapy, did not return.  No further therapy is required at this time.  The patient declined therapy because Patient was following up with [facility] and had injections, per patient communication via email the patient noted an increase in spasms and pain following the injections and cancelled his last appointment. this therapist instructed patient the chart would be held open until she heard from him again. he has not contacted the clinic and has not returned, he will now be discharged from physical therapy and will need a new order to return. .    Therapy will be discontinued at this time.  The patient will need a new referral to resume.    Thank you for your referral.  Ladonna Chema  4/3/2018  10:49 AM    [facility] Daily Progress     Patient Name: Nadir Cantu  Date: 11/8/2017  Visit #: 4  PTA visit #:    Date of evaluation: 9/22/2017  Referral Diagnosis: Physical deconditioning  Referring provider: Boy Pickett*  Visit Diagnosis:      ICD-10-CM    1. Decreased strength, endurance, and mobility R53.1     Z74.09    2. Muscle weakness (generalized) M62.81    3. Chronic bilateral low back pain without sciatica M54.5     G89.29    4. Gait instability R26.81      Initial Assessment:   Nadir Cantu is a 67 y.o. male  Returns to therapy with complains of deconditioning, difficulty breathing at times and chronic low back pain. Patient demonstrates improvements in testing from last evaluation to today but still test below age gender normal in gait speed and sit to stands. Patient has mild decrease in lumbar motion with pain at end range and proximal hip weakness and core weakness. Patient will benefit from skilled PT intervention to increase strength and endurance, decrease back pain and regain functional mobility    Assessment:     HEP/POC compliance is  good .  Patient demonstrates understanding/independence with home program.  Patient is benefitting from skilled physical therapy and is making steady progress toward functional goals.  Patient is appropriate to continue with skilled physical therapy intervention, as indicated by initial plan of care.    Goal Status:  Pt. will be independent with home exercise program in : 6 weeks  Pt. will be able to walk : 30 minutes;with less difficulty;for exercise/recreation;for community mobility;in 6 weeks  Pt will: increase 30 sec sit to stands to >12 reps for decrease in risk for falls and increase in strength and endurance  Pt will: increase distance on 2 minute walk test >185 meters keeping O2 stats >95% and HR less than 110 bpm in 6 weeks    Plan / Patient Education:     Continue with initial plan of care.  Progress with home program as tolerated.  Plan for next visit: continue with  NuStep vs Treadmill, LE strengthening exercises, core strengthening, endurance training, give more ideas for pool exercises   Subjective:     Pain Ratin   Patient reports he has not done the band exercises or the  supine exercises since last session, he states he has had an increase in pain in the back, upper back and neck and will be going down to [place] next week for L4-5 epidural injections on Tuesday. He has been going to the pool 2-3 times per week or every 2-3 days but he did have some complications around a colonoscopy that limited time there as well. Wants to review exercises and see if they can be modified to decrease pain or limit the increase in pain with them.       Objective:     Therapeutic Exercise:   - NuStep WL 5 x 8 minutes cues to keep spm >60; average spm 58   - supine TA sets 5 sec hold x 10 reps   - PPT 10 sec hold x 5 reps   - Bridges 5 sec hold x 10 reps   - SLR x 10 reps each side   - band TA set pulls 10 sec hold x 10 reps, increased to 30 sec hold x 3 reps with decrease in pain   - band rows with scap set x 10 reps    Spent significant time discussing exercises in pool, stretching in pool and on land, discussing sitting positions when working on laptop computer as well as sleeping positions.     Reviewed standing hip abduction, extension and sit to stands that he is doing consistently in the pool.     Treatment Today   11/8/2017  TREATMENT MINUTES COMMENTS   Evaluation     Self-care/ Home management     Manual therapy     Neuromuscular Re-education     Therapeutic Activity     Therapeutic Exercises 45 See above   Significant time spent discussing trip to [place] and procedure he had done. Discussed HEP from last session, pool exercises and progression. Discussed using NuStep or treadmill at the [facility]    Gait training     Modality__________________                Total 45    Blank areas are intentional and mean the treatment did not include these items.     Ladonna Bear, PT, DPT   11/8/2017

## 2021-06-14 NOTE — PROGRESS NOTES
Assessment:     1. Sore throat  Rapid Strep A Screen-Throat    Group A Strep, RNA Direct Detection, Throat   2. Bronchitis, acute  amoxicillin-clavulanate (AUGMENTIN) 875-125 mg per tablet    benzonatate (TESSALON PERLES) 100 MG capsule   3. Acute respiratory distress  albuterol (PROAIR HFA;PROVENTIL HFA;VENTOLIN HFA) 90 mcg/actuation inhaler   4. Diverticulitis of intestine without perforation or abscess, unspecified bleeding status, unspecified part of intestinal tract  amoxicillin-clavulanate (AUGMENTIN) 875-125 mg per tablet     Results for orders placed or performed in visit on 12/23/17   Rapid Strep A Screen-Throat   Result Value Ref Range    Rapid Strep A Antigen No Group A Strep detected, presumptive negative No Group A Strep detected, presumptive negative     X ray results:         Plan:     -Patient received treatment during the visit and his lung sounds were clear after the treatment.   -Encouraged patient to do pulmonary toilet at least 10 times per hour to prevent lung collapse.   -Treated patient for bronchitis with antibiotics.  Advised patient to take medications as prescribed.  Patient she will follow-up with PCP if symptoms does not improve after treatment.  -Advised him to use ibuprofen or Tylenol for pain or fever.       Subjective:       67 y.o. male presents for evaluation of a cough, congestion, chest tightness, and body aches.  Patient reports that his symptoms started about 2 weeks ago when he had cold-like symptoms.  He reports some congestion in his chest and when he breath he has a rattling sound.  He reports that he gets shortness of breath sometimes and he gets tired very quickly.  History of smoking 50 years ago.  He also reports that he he has diverticulitis pain when his cough he has been having hot and cold feeling and sometimes he has noticed he gets a low-grade fever.  He denies nausea, vomiting, diarrhea.  She reports that he has been using Tylenol, NyQuil, and cough drops to  "relieves cold symptoms and sometimes he gets minimal relief.      The following portions of the patient's history were reviewed and updated as appropriate: allergies, current medications, past family history, past medical history, past social history, past surgical history and problem list.    Review of Systems  A 12 point comprehensive review of systems was negative except as noted.     Objective:      /80 (Patient Site: Right Arm, Patient Position: Sitting, Cuff Size: Adult Large)  Pulse 83  Temp 97.8  F (36.6  C) (Oral)   Resp 20  Ht 6' 1\" (1.854 m)  Wt (!) 252 lb (114.3 kg)  SpO2 96%  BMI 33.25 kg/m2  General appearance: alert, appears stated age, cooperative and mild distress  Head: Normocephalic, without obvious abnormality, atraumatic  Eyes: conjunctivae/corneas clear. PERRL, EOM's intact. Fundi benign.  Ears: abnormal TM right ear - erythematous and bulging and abnormal TM left ear - erythematous and air-fluid level  Nose: copious and green discharge, mild congestion  Throat: abnormal findings: mild oropharyngeal erythema  Neck: mild anterior cervical adenopathy, no carotid bruit, no JVD, supple, symmetrical, trachea midline and thyroid not enlarged, symmetric, no tenderness/mass/nodules  Lungs: bronchophony LLL and RLL and rhonchi bilaterally  Heart: regular rate and rhythm, S1, S2 normal, no murmur, click, rub or gallop  Abdomen: abnormal findings:  normal bowel sounds with generalized tenderness to palpation.   Pulses: 2+ and symmetric  Skin: Skin color, texture, turgor normal. No rashes or lesions  Lymph nodes: Cervical adenopathy: noted  Neurologic: Grossly normal     This note has been dictated using voice recognition software. Any grammatical or context distortions are unintentional and inherent to the software    "
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
## 2021-06-15 NOTE — PROGRESS NOTES
ASSESSMENT/PLAN:      1. Mild intermittent asthma without complication  Exacerbation with cough related to recent LRI and bronchitis.  Is completing a course of augmentin.  Persistent cough.  XRays of sinus and chest are negative for acute abnormalities. Will take a short course of prednisone 40mg po daily for 2 days, then taper by 10mg po every 2 days until d/c.  Cheratussin with codeine and benzonatate oral prn for cough.  Reassured.  Post infectious cough may persist for sometime.   - XR Chest 2 Views  - XR Sinus [place] VW Only; Future    2. Acute bronchitis  See above, complete the augmentin course.     3. Obstructive apnea  Relatively asymptomatic, does not use CPAP.     4. Type 2 diabetes mellitus without complication  He is diet controlled and working on weight loss, he will be seen in the future for A1C and preventive health evaluation.     Orders Placed This Encounter   Procedures     XR Chest 2 Views     Order Specific Question:   Can the procedure be changed per Radiologist protocol?     Answer:   Yes     XR Sinus [place] VW Only     Standing Status:   Future     Number of Occurrences:   1     Standing Expiration Date:   12/28/2018     Order Specific Question:   Can the procedure be changed per Radiologist protocol?     Answer:   Yes     There are no discontinued medications.    No Follow-up on file.  CHIEF COMPLAINT:  Chief Complaint   Patient presents with     Follow-up     Asthma     Diabetes     Establish Care       HISTORY OF PRESENT ILLNESS:  aNdir Cantu is a 67 y.o. male had a URI about three weeks ago with acute arthralgia/myalgia and rhinorrhea and cough.  This was persistent.  He was seen in ER  12//23/17.  There was vague lower abdominal pain, likely related to coughing.  He was prescribed augmentin which he is still on for a 10 day course.  He hasn't improved much, and there is some wheezing intermittently.  Very mild dyspnea.  No chest pain, or further chills, or fever, or abdominal  "pain or diarrhea.  Denies also any voiding symptoms.  Not having reflux, working on weight loss.  No headache or confusion.    TOBACCO USE:  History   Smoking Status     Former Smoker     Types: Cigarettes     Quit date: 4/25/1969   Smokeless Tobacco     Never Used     VITALS:  Vitals:    12/28/17 1111   BP: 124/66   Patient Site: Left Arm   Patient Position: Sitting   Cuff Size: Adult Large   Pulse: 85   Temp: 97.4  F (36.3  C)   TempSrc: Tympanic   SpO2: 93%   Weight: (!) 253 lb 8 oz (115 kg)   Height: 6' 1\" (1.854 m)     Wt Readings from Last 3 Encounters:   12/28/17 (!) 253 lb 8 oz (115 kg)   12/23/17 (!) 252 lb (114.3 kg)   09/28/17 (!) 264 lb (119.7 kg)       PHYSICAL EXAM:  Constitutional:  Alert, in NAD  Ears:  Clear.  Nose - clear rhinorrhea.   Oropharynx:  Minor erythema, no exudate  Neck:  Supple, no adenopathy.  Cardiac:  Regular rate and rhythm without murmur  Lungs: bilateral scant wheeze and rhonchi. No wet rales heard  Abdomen:   Bowel sounds positive, nontender, nondistended.     ADDITIONAL HISTORY SUMMARIZED (FROM OLD RECORDS OR HISTORY FROM SOMEONE OTHER THAN THE PATIENT OR ANOTHER HEALTHCARE PROVIDER) (2 TOTAL): [facility] ER records are reviewed. .     LABS REVIEWED OR ORDERED (1 TOTAL): CXR and sinus xray reviewed today.    MEDICATIONS:  Current Outpatient Prescriptions   Medication Sig Dispense Refill     albuterol (PROAIR HFA;PROVENTIL HFA;VENTOLIN HFA) 90 mcg/actuation inhaler Inhale 2 puffs every 6 (six) hours as needed for wheezing or shortness of breath. 1 Inhaler 12     aluminum hydrox-magnesium carb (GAVISCON)  mg/15 mL Susp Take by mouth.       aluminum hydrox-magnesium carb 160-105 mg Chew Chew.       aluminum-magnesium hydroxide 200-200 mg/5 mL suspension Take 30 mL by mouth 4 (four) times a day as needed for indigestion.       amoxicillin-clavulanate (AUGMENTIN) 875-125 mg per tablet Take 1 tablet by mouth 2 (two) times a day for 7 days. 14 tablet 0     atorvastatin (LIPITOR) 40 " MG tablet Take 1 tablet (40 mg total) by mouth daily. 90 tablet 0     atorvastatin (LIPITOR) 40 MG tablet Take by mouth.       azelastine (ASTELIN) 137 mcg (0.1 %) nasal spray Use in each nostril twice daily as directed 30 mL 12     azelastine (ASTELIN) 137 mcg (0.1 %) nasal spray into each nostril.       benzonatate (TESSALON PERLES) 100 MG capsule Take 1 capsule (100 mg total) by mouth every 6 (six) hours as needed for cough. 30 capsule 0     calcium carbonate-simethicone (GAS-X WITH MAALOX) 500-125 mg Chew Chew 1 tablet daily.       calcium, as carbonate, (TUMS) 200 mg calcium (500 mg) chewable tablet Chew 1 tablet daily. Patient takes 4500 mg daily.       fluticasone (FLONASE) 50 mcg/actuation nasal spray INHALE 1 TO 2 SPRAYS INTO EACH NOSTRIL ONCE DAILY 48 g 2     lansoprazole (PREVACID) 30 MG capsule TAKE 1 CAPSULE BY MOUTH TWICE DAILY 90 capsule 0     LORazepam (ATIVAN) 1 MG tablet TAKE 1 TABLET (1 MG TOTAL) BY MOUTH AS NEEDED FOR ANXIETY. 90 tablet 2     losartan (COZAAR) 50 MG tablet Take 1.5 tablets daily 45 tablet 11     methylcellulose (CITRUCEL) 500 mg Tab Take 500 mg by mouth daily. Take 6 tablets by mouth daily.       methylcellulose (CITRUCEL) 500 mg Tab Take by mouth.       metoclopramide (REGLAN) 10 MG tablet Take by mouth.       metroNIDAZOLE (METROGEL) 1 % gel Apply 1 application topically daily. Apply sparingly to affected area(s) once daily.       ranitidine (ZANTAC) 150 MG tablet TAKE 1 TABLET BY MOUTH TWICE A DAY 60 tablet 11     simethicone (GAS-X) 80 MG chewable tablet Chew.       triamcinolone (KENALOG) 0.1 % cream Apply to rash twice daily 60 g 1     aspirin (ASPIRIN LOW DOSE) 81 MG EC tablet Take by mouth.       codeine-guaiFENesin (CHERATUSSIN AC)  mg/5 mL liquid Take 5 mL by mouth 3 (three) times a day as needed for cough. 118 mL 0     predniSONE (DELTASONE) 10 mg tablet Take 1 tablet (10 mg total) by mouth see administration instructions. 4 qd for 2d, 3qd for 2d, 2 qd for 2d,  1qd for 2 d, then d/c 20 tablet 0     No current facility-administered medications for this visit.

## 2021-06-16 PROBLEM — I25.10 CORONARY ARTERY DISEASE DUE TO LIPID RICH PLAQUE: Chronic | Status: ACTIVE | Noted: 2019-04-19

## 2021-06-16 PROBLEM — I25.83 CORONARY ARTERY DISEASE DUE TO LIPID RICH PLAQUE: Chronic | Status: ACTIVE | Noted: 2019-04-19

## 2021-06-16 PROBLEM — Z79.899 CHRONIC PRESCRIPTION BENZODIAZEPINE USE: Chronic | Status: ACTIVE | Noted: 2019-06-27

## 2021-06-16 PROBLEM — N40.1 BENIGN PROSTATIC HYPERPLASIA WITH LOWER URINARY TRACT SYMPTOMS: Chronic | Status: ACTIVE | Noted: 2020-08-06

## 2021-06-16 PROBLEM — E78.5 DYSLIPIDEMIA, GOAL LDL BELOW 70: Chronic | Status: ACTIVE | Noted: 2019-06-27

## 2021-06-16 NOTE — PROGRESS NOTES
Assessment and Plan:       1. Medicare annual wellness visit, subsequent  See below    2. Type 2 diabetes mellitus without complication  He reports no significant symptoms.  Has had 20 pound weight loss.  Will get A1C and metabolic profile    3. Mixed Hyperlipoproteinemia  Ongoing treatment, not changed, get lipid profile, he is fasting.     4. Obstructive apnea  Has been diagnosed.  Stable.      5. Back pain  Chronic back pain, recent increase.  No radicular symptoms.  Suspect degenerative joint disease.  He will try celebrex - he gets GERD symptoms with regular NSAID's.  Weight loss will help.  Will require further evaluation and treatment if persists and after present evaluations.     6. Peripheral vascular disease  S/p Carotid endarterectomy, now asymptomatic.  History of 'peterson' spots in eyes, with evaluation for etiology negative, in the past.     7. Neck and L shoulder pain  Suggestive of symptomatic cervical spinal stenosis with bilateral shoulder discomfort and scalenus anticus arm symptoms. I don't find indication of radiculopathy.  Also likely has left rotator cuff acute on chronic pain.  Possible early left frozen shoulder.  Will get MR cervical spine.  He likely will need orthopedic referral for left shoulder pain once, cervical spine issues are clarified.  He has a cervical kyphosis that may be related to central obesity and may play a role in neck and shoulder symptoms.  Celebrex may be of benefit.  Proceed with neck MRI to r/o progressing spondylosis, or spinal stenosis.     8. Urinary urgency  This may be hyperactive bladder disorder, perhaps related to diabetic neuropathy.  Possibly it is BPH. Will get UA and PSA.  Might consider tamsulosin rx, or urology referral, particularly if high PSA.      8. GERD -   Continue present treatments,  Monitor symptoms using celebrex.  Continue weight loss.     The following health maintenance schedule was reviewed with the patient and provided in printed form  in the after visit summary:   Health Maintenance   Topic Date Due     DIABETES OPHTHALMOLOGY EXAM  [birth year]20/1960     DIABETES URINE MICROALBUMIN  12/09/2017     DIABETES HEMOGLOBIN A1C  12/20/2017     DIABETES FOLLOW-UP  06/28/2018     ASTHMA CONTROL TEST  12/28/2018     DIABETES FOOT EXAM  12/28/2018     ASTHMA FOLLOW-UP  12/28/2018     FALL RISK ASSESSMENT  12/28/2018     ADVANCE DIRECTIVES DISCUSSED WITH PATIENT  05/01/2019     TD 18+ HE  12/04/2024     COLONOSCOPY  10/23/2027     PNEUMOCOCCAL POLYSACCHARIDE VACCINE AGE 65 AND OVER  Completed     INFLUENZA VACCINE RULE BASED  Completed     PNEUMOCOCCAL CONJUGATE VACCINE FOR ADULTS (PCV13 OR PREVNAR)  Completed     ZOSTER VACCINE  Completed        Subjective:   Chief Complaint: Nadir Cantu is an 68 y.o. male here for an Annual Wellness visit.     HPI:  Multiple issues to discuss.  Ongoing neck and shoulder pain, with increased left shoulder pain at night.  No trauma.  No radicular symptoms.  History of epidural neck injections.  Is losing weight intentionally. Does sit at computer long hours for work.  He will awaken with hand numbness in the 4th and 5th fingers.  No weakness.  It improves with activity.  No chest pain or unusual cough, or dyspnea.  Denies symptoms of blurred vision.  Does have occasional urgency, and post void dribbling, no dysuria, and minimal nocturia.  Some hesitancy.  He notes that GERD symptoms are much better with weight loss.     Review of Systems:  See HPI The rest of the review of systems are negative.    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     Chronic Reflux Esophagitis     Obesity     Obstructive apnea     Mixed Hyperlipoproteinemia     Chronic Pain Due To Trauma     Cervicalgia     Type 2 diabetes mellitus without complication     Hearing Loss     Left Rotator Cuff  "Tendonitis     Aspirin intolerance     Asthma     Back pain     Peripheral vascular disease     Past Medical History:   Diagnosis Date     Anxiety      Asthma      Chronic back pain      Diabetes mellitus     \"pre-DM\"     Hearing loss      Hyperlipidemia      Hypertension      Obesity      STEVE (obstructive sleep apnea)      Peripheral vascular disease      Reflux esophagitis      Salmonella dysentery 2014      Past Surgical History:   Procedure Laterality Date     CAROTID ENDARTERECTOMY  2013     CATARACT OS  09/21/2014     SC APPENDECTOMY      Description: Appendectomy;  Recorded: 05/22/2009;     SC BIOPSY OF SKIN LESION      Description: Biopsy Skin;  Recorded: 05/22/2009;     SC LAP,CHOLECYSTECTOMY      Description: Cholecystectomy Laparoscopic;  Recorded: 02/14/2011;      Family History   Problem Relation Age of Onset     Heart disease Father      Heart disease Sister       Social History     Social History     Marital status:      Spouse name: N/A     Number of children: N/A     Years of education: N/A     Occupational History     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[employer]     Social History Main Topics     Smoking status: Former Smoker     Types: Cigarettes     Quit date: 4/25/1969     Smokeless tobacco: Never Used     Alcohol use No     Drug use: No     Sexual activity: Yes     Partners: Female     Other Topics Concern     Not on file     Social History Narrative      Current Outpatient Prescriptions   Medication Sig Dispense Refill     albuterol (PROAIR HFA;PROVENTIL HFA;VENTOLIN HFA) 90 mcg/actuation inhaler Inhale 2 puffs every 6 (six) hours as needed for wheezing or shortness of breath. 1 Inhaler 12     aluminum hydrox-magnesium carb 160-105 mg Chew Chew.       aluminum-magnesium hydroxide 200-200 mg/5 mL suspension Take 30 mL by mouth 4 (four) times a day as needed for indigestion.       aspirin (ASPIRIN LOW DOSE) 81 MG EC tablet Take by mouth 2 (two) times a day.        " atorvastatin (LIPITOR) 40 MG tablet Take by mouth.       azelastine (ASTELIN) 137 mcg (0.1 %) nasal spray into each nostril.       benzonatate (TESSALON PERLES) 100 MG capsule Take 1 capsule (100 mg total) by mouth every 6 (six) hours as needed for cough. 30 capsule 0     calcium carbonate-simethicone (GAS-X WITH MAALOX) 500-125 mg Chew Chew 1 tablet daily.       calcium, as carbonate, (TUMS) 200 mg calcium (500 mg) chewable tablet Chew 1 tablet daily. Patient takes 4500 mg daily.       fluticasone (FLONASE) 50 mcg/actuation nasal spray INHALE 1 TO 2 SPRAYS INTO EACH NOSTRIL ONCE DAILY 48 g 2     lansoprazole (PREVACID) 30 MG capsule TAKE 1 CAPSULE BY MOUTH TWICE DAILY 90 capsule 0     LORazepam (ATIVAN) 1 MG tablet TAKE 1 TABLET (1 MG TOTAL) BY MOUTH AS NEEDED FOR ANXIETY. 90 tablet 2     losartan (COZAAR) 50 MG tablet Take 1.5 tablets daily 45 tablet 9     metoclopramide (REGLAN) 10 MG tablet Take by mouth.       metroNIDAZOLE (METROGEL) 1 % gel Apply 1 application topically daily. Apply sparingly to affected area(s) once daily.       ranitidine (ZANTAC) 150 MG tablet Take 1 tablet (150 mg total) by mouth 2 (two) times a day. 60 tablet 9     simethicone (GAS-X) 80 MG chewable tablet Chew.       codeine-guaiFENesin (CHERATUSSIN AC)  mg/5 mL liquid Take 5 mL by mouth 3 (three) times a day as needed for cough. 118 mL 0     methylcellulose (CITRUCEL) 500 mg Tab Take 500 mg by mouth daily. Take 6 tablets by mouth daily.       predniSONE (DELTASONE) 10 mg tablet Take 1 tablet (10 mg total) by mouth see administration instructions. 4 qd for 2d, 3qd for 2d, 2 qd for 2d, 1qd for 2 d, then d/c 20 tablet 0     triamcinolone (KENALOG) 0.1 % cream Apply to rash twice daily 60 g 1     No current facility-administered medications for this visit.       Objective:   Vital Signs:   Visit Vitals     /74 (Patient Site: Left Arm, Patient Position: Sitting, Cuff Size: Adult Regular)     Pulse 72     Ht 6' (1.829 m)     Wt  (!) 244 lb 9.6 oz (110.9 kg)     SpO2 94%     BMI 33.17 kg/m2      PHYSICAL EXAM  General - well appearing in NAD  Heent- clear without congestion  Neck - kyphosis, good ROM, but limited bilateral bending.  No adenopathy, or  thyroid enlargement  Chest - clear to auscultation  Heart - S1 S2 without murmur  Abdomen - obese, soft, flat and non-tender, no mass, or rebound or guarding,  Extremities - no edema, pulses are present, but diminished, no ulcer or inflammation, left shoulder has limited abduction and external rotation  Neurologic - gait is normal, Motor and sensory reveal no focal weakness, speech is clear,     Assessment Results 3/28/2018   Activities of Daily Living No help needed   Instrumental Activities of Daily Living No help needed   Mini Cog Total Score 5   Some recent data might be hidden     A Mini-Cog score of 0-2 suggests the possibility of dementia, score of 3-5 suggests no dementia    Identified Health Risks:

## 2021-06-16 NOTE — TELEPHONE ENCOUNTER
Refill Approved    Rx renewed per Medication Renewal Policy. Medication was last renewed on 6/20/2020.    Alpesh Davis, [facility] Triage/Med Refill 3/22/2021     Requested Prescriptions   Pending Prescriptions Disp Refills     atorvastatin (LIPITOR) 80 MG tablet [Pharmacy Med Name: ATORVASTATIN 80 MG TABLET] 90 tablet 2     Sig: TAKE 1 TABLET BY MOUTH EVERY DAY       Statins Refill Protocol (Hmg CoA Reductase Inhibitors) Passed - 3/21/2021 10:03 AM        Passed - PCP or prescribing provider visit in past 12 months      Last office visit with prescriber/PCP: 2/18/2021 Adolfo Kraus MD OR same dept: 2/18/2021 Adolfo Kraus MD OR same specialty: 2/18/2021 Adolfo Kraus MD  Last physical: 8/6/2020 Last MTM visit: Visit date not found   Next visit within 3 mo: Visit date not found  Next physical within 3 mo: Visit date not found  Prescriber OR PCP: Adolfo Kraus MD  Last diagnosis associated with med order: 1. Dyslipidemia, goal LDL below 70  - atorvastatin (LIPITOR) 80 MG tablet [Pharmacy Med Name: ATORVASTATIN 80 MG TABLET]; TAKE 1 TABLET BY MOUTH EVERY DAY  Dispense: 90 tablet; Refill: 2    If protocol passes may refill for 12 months if within 3 months of last provider visit (or a total of 15 months).

## 2021-06-16 NOTE — TELEPHONE ENCOUNTER
Telephone Encounter by Jeanie Modi LPN at 3/21/2019  9:02 AM     Author: Jeanie Modi LPN Service: -- Author Type: Licensed Nurse    Filed: 3/21/2019  9:05 AM Encounter Date: 3/20/2019 Status: Signed    : Jeanie Modi LPN (Licensed Nurse)       Medication: Lorazepam  Last Date Filled 8/31/18   pulled: YES       Only PCP Prescribing? : NO  Taken as prescribed from physician notes? unknown    CSA in last year: NO  Random Utox in last year: NO  (if any of the above answer NO - schedule with PCP)     Opioids + benzodiazepines? NO  (if the above answer YES - schedule with PCP every 6 months)     All responses suggest: Refilling prescription

## 2021-06-16 NOTE — PATIENT INSTRUCTIONS - HE
Nadir,    It was a pleasure to see you today at [facility].    My nurse or I will release your test results to [facility] with an explanation about what they mean and what to do next.    Please reduce the chlorthalidone to one half tablet by mouth daily (12.5 mg)    Please call us if you have any questions or concerns about your heart.      Nikolas Mcnair M.D.  [facility]

## 2021-06-16 NOTE — TELEPHONE ENCOUNTER

## 2021-06-17 NOTE — PATIENT INSTRUCTIONS - HE
Patient Instructions by Adolfo Kraus MD at 6/27/2019  9:55 AM     Author: Adolfo Kraus MD Service: -- Author Type: Physician    Filed: 6/27/2019  5:37 PM Encounter Date: 6/27/2019 Status: Addendum    : Adolfo Kraus MD (Physician)    Related Notes: Original Note by Adolfo Kraus MD (Physician) filed at 6/27/2019 10:31 AM       1.  No changes in medications    2. Lab testing today.    3. Follow up in three months.   Patient Education    Home Fire Safety  Each year, thousands of people, including children, are injured and killed in home fires. Children are often curious about fire, and may not understand the dangers. This makes home fire safety practices especially important. Three important things you can do to keep your home safe from fire are:    Install smoke alarms in your home and make sure they work properly.    Teach children not to play with matches, lighters, and other materials that can be used to start fires. And keep these materials out of childrens reach.    Teach children what to do in case of fire. Create a fire safety action plan and practice it.  Read on for more details about keeping your family and home safe from fire.        Being Prepared for a Fire  A home fire can happen at any time. The following can help you be prepared:    Install smoke alarms on every level of your home, including the basement and outside all sleeping areas. This simple step cuts your familys risk of dying in a fire nearly in half.    Test smoke alarms monthly, and change the batteries once a year or when the alarm chirps.    Dont disable smoke alarms, even for a short time.    Ask your local fire department for tips on where to place smoke alarms in your home.    Replace all smoke alarms every 10 years.    Consider using voice smoke alarms. These alarms allow you to substitute your own voice for the alarm sound. They are helpful because many children dont wake up to the sound of a regular smoke  alarm.    Install carbon monoxide detectors near sleeping areas.    Be aware that carbon monoxide is a byproduct of smoke that can be deadly. Its a gas that you cant see, smell, or taste.    Consider buying a combination smoke alarm / carbon monoxide detector.    Keep fire extinguishers in the home.    Keep them in accessible locations, especially in the kitchen.    Check usage dates to make sure they are not .    Use fire extinguishers only when the fire is in a contained area and is not spreading. (Otherwise, you should focus on getting out of the home.)    Train adults to use fire extinguishers. (Children should focus on getting out of the home during a fire.)    If you live in an apartment, talk to your landlord about where smoke alarms are and how often they are tested. Also ask about fire extinguisher locations and emergency exit routes.  Indoor Fire Safety  Many things in your home are potential fire hazards. Follow these steps to help keep your home safe.    Be careful in the kitchen.    Never leave food thats cooking unattended.    If a fire breaks out in a cooking pan, put a lid on it to smother it. And never throw water on a grease fire. It will make the fire worse.    Conduct a home safety inspection. Look for anything, such as frayed wires and cords, that can cause a fire. Fix or remove any fire safety hazards you find.    Keep all matches and lighters in a secured drawer or cabinet out of the reach of children. Use childproof lighters.    Check to make sure all appliances, including the stove, are turned off before leaving the home.    Know where the gas main shut-off is located.    Make sure space heaters are stable and have protective covers. Keep them at least 3 feet from anything that can burn, such as curtains. Dont use space heaters in areas where young kids spend time alone.    Keep flammable liquids such as kerosene and gasoline locked up and safely stored away from kids and  heat.    Keep all smoking materials out of reach of children. And never smoke in bed. If possible, smoke outdoors only.  Outdoor Fire Safety  Fire can be a hazard outdoors as well as indoors. When outdoors, be sure to do the following:    Always supervise kids near a barbecue grill, campfire, or portable stove.    Dont use fire pits around children. Kids can fall into them, and pits can be hot even after the fire goes out.    Keep a garden hose or fire extinguisher handy when cooking outdoors in case of fire.  Teaching Your Child About Fire Safety  One of the best ways to keep your home safe from fire is education. Make sure everyone in your family knows fire safety rules, including children.    Teach your children the dangers of matches, lighters, and other dangerous items.    Teach them to never touch these and other objects that are hot, such as candles.    Have them tell you right away whenever they find matches or lighters. Explain that these items are tools for grown-ups, not toys. And never amuse children with matches or lighters.    Round up all matches and lighters and store them safely. In case you missed some, ask your children to tell you where any are located throughout your home.    Never leave a child alone in a room with a lit candle. Dont allow teens to have candles in their rooms.    Show children what to do in case of fire.    Be sure your kids know what the fire alarm sounds like and what to do if it goes off.    Teach kids what to do if their clothes catch fire: Stop, Drop to the ground, and Roll until the fire is put out. They should also cover their face with their hands. Practice these steps with your children. Make sure they understand that running will make the fire burn faster.    Show children how to crawl below smoke during a fire.    Make sure kids know at least two escape routes from each room in the home. These escape routes can be windows.    Teach kids to test doors for nearby fire  by feeling for heat with the back of their hand. If the door is warm or hot, they should try their second exit.    Explain to children that they cant hide from a fire. Hiding in a closet or under a bed wont make them safe. Instead, they should try to escape the home. And if they cant escape, they should let others know they are trapped. They can do this by shutting the door to the room, opening a window, and turning on the lights.    Talk to your local fire department.    Introduce your children to a . Let them know that firefighters will look different when in full protective gear. Tell them to never hide from firefighters, and to follow all directions from firefighters during a fire.    Find out if the fire department has a fire safety program for kids.      Create a Fire Safety Plan  Create a plan for your family to follow in case of a fire. Try making it a family project. Important steps for the plan include leaving the home right away and having a designated meeting place.    Make sure your child understands to get out and stay out. He or she should get out of the home immediately and not go back in, even if family members or pets are still inside.    Decide on a safe meeting place away from the home for everyone to gather.    Teach children to call 911 or emergency services from a cell phone or neighbors phone. Make sure they know to do this only after they are safely out of the home.    Teach your children the fire safety plan. Practice it and make sure they understand it.    Have fire drills twice a year to keep your children prepared in case of fire.    Visit the National Fire Protection Association web site at www.nfpa.org for more information.      5071-3376 [employer]. [address] [place] [address]. All rights reserved. This information is not intended as a substitute for professional medical care. Always follow your healthcare professional's instructions.            Advance Directive  Patients advance directive was discussed and I am comfortable with the patients wishes.  Patient Education   Personalized Prevention Plan  You are due for the preventive services outlined below.  Your care team is available to assist you in scheduling these services.  If you have already completed any of these items, please share that information with your care team to update in your medical record.  Health Maintenance   Topic Date Due   ? ZOSTER VACCINES (2 of 3) 03/20/2012   ? DIABETES URINE MICROALBUMIN  12/09/2017   ? DIABETES FOLLOW-UP  06/28/2018   ? DIABETES FOOT EXAM  12/28/2018   ? ADVANCE DIRECTIVES DISCUSSED WITH PATIENT  05/01/2019   ? DIABETES HEMOGLOBIN A1C  05/14/2019   ? DIABETES OPHTHALMOLOGY EXAM  10/29/2019   ? FALL RISK ASSESSMENT  04/03/2020   ? ASTHMA CONTROL TEST  04/29/2020   ? ASTHMA FOLLOW-UP  04/29/2020   ? TD 18+ HE  12/04/2024   ? COLONOSCOPY  10/23/2027   ? PNEUMOCOCCAL POLYSACCHARIDE VACCINE AGE 65 AND OVER  Completed   ? INFLUENZA VACCINE RULE BASED  Completed   ? PNEUMOCOCCAL CONJUGATE VACCINE FOR ADULTS (PCV13 OR PREVNAR)  Completed

## 2021-06-17 NOTE — PATIENT INSTRUCTIONS - HE
"Patient Instructions by Catarino Burnett DO at 7/16/2019  1:00 PM     Author: Catarino Burnett DO Service: -- Author Type: Physician    Filed: 7/16/2019  1:39 PM Encounter Date: 7/16/2019 Status: Addendum    : Catarino Burnett DO (Physician)    Related Notes: Original Note by Catarino Burnett DO (Physician) filed at 7/16/2019  1:39 PM         What is sleep apnea?    Sleep apnea is a condition that makes you stop breathing for short periods while you are asleep. There are 2 types of sleep apnea. One is called \"obstructive sleep apnea,\" and the other is called \"central sleep apnea.\"  In obstructive sleep apnea, you stop breathing because your throat narrows or closes (figure 1). In central sleep apnea, you stop breathing because your brain does not send the right signals to your muscles to make you breathe. When people talk about sleep apnea, they are usually referring to obstructive sleep apnea, which is what this article is about.  People with sleep apnea do not know that they stop breathing when they are asleep. But they do sometimes wake up startled or gasping for breath. They also often hear from loved ones that they snore.  What are the symptoms of sleep apnea? -- The main symptoms of sleep apnea are loud snoring, tiredness, and daytime sleepiness. Other symptoms can include:  ?Restless sleep  ?Waking up choking or gasping  ?Morning headaches, dry mouth, or sore throat  ?Waking up often to urinate  ?Waking up feeling unrested or groggy  ?Trouble thinking clearly or remembering things  Some people with sleep apnea don't have symptoms, or they don't know they have them. They might figure that it's normal to be tired or to snore a lot.  Should I see a doctor or nurse? -- Yes. If you think you might have sleep apnea, see your doctor.  Is there a test for sleep apnea? -- Yes. If your doctor or nurse suspects you have sleep apnea, he or she might send you for a \"sleep study.\" Sleep studies can sometimes be " "done at home, but they are usually done in a sleep lab. For the study, you spend the night in the lab, and you are hooked up to different machines that monitor your heart rate, breathing, and other body functions. The results of the test will tell your doctor or nurse if you have the disorder.  Is there anything I can do on my own to help my sleep apnea? -- Yes. Here are some things that might help:  ?Stay off your back when sleeping. (This is not always practical, because people cannot control their position while asleep. Plus, it only helps some people.)  ?Lose weight, if you are overweight  ?Avoid alcohol, because it can make sleep apnea worse  How is sleep apnea treated? -- The most effective treatment for sleep apnea is a device that keeps your airway open while you sleep. Treatment with this device is called \"continuous positive airway pressure,\" or CPAP. People getting CPAP wear a face mask at night that keeps them breathing (figure 2).  If your doctor or nurse recommends a CPAP machine, try to be patient about using it. The mask might seem uncomfortable to wear at first, and the machine might seem noisy, but using the machine can really pay off. People with sleep apnea who use a CPAP machine feel more rested and generally feel better.  There is also another device that you wear in your mouth called an \"oral appliance\" or \"mandibular advancement device.\" It also helps keep your airway open while you sleep.  In rare cases, when nothing else helps, doctors recommend surgery to keep the airway open. Surgery to do this is not always effective, and even when it is, the problem can come back.  Is sleep apnea dangerous? -- It can be. People with sleep apnea do not get good-quality sleep, so they are often tired and not alert. This puts them at risk for car accidents and other types of accidents. Plus, studies show that people with sleep apnea are more likely than others to have high blood pressure, heart attacks, " and other serious heart problems. In people with severe sleep apnea, getting treated (for example, with a CPAP machine) can help prevent some of these problems.    GRAPHICS  Airway in a person with sleep apnea    Normally when a person sleeps, the airway remains open, and air can pass from the nose and mouth to the lungs. In a person with sleep apnea, parts of the throat and mouth drop into the airway and block off the flow of air. This can cause loud snoring and interrupt breathing for short periods.  Graphic 90986 Version 5.0    Continuous positive airway pressure (CPAP) for sleep apnea    The CPAP mask gently blows air into your nose while you sleep. It puts just enough pressure on your airway to keep it from closing. The mask in this picture fits over just the nose. Other CPAP devices have masks that fit over the nose and mouth.     Equipment Instructions    We will process your PAP order and send it to a Durable Medical Equipment (DME) provider.    The medical equipment company should call you within 7 days.  If you have not heard from the company, please contact them to see if they received your order and are planning to call you.    Please call us at [phone] if you are unable to contact the medical equipment company or if they do not have the order.    If you are starting a new PAP machine, please call us after you use it the first night to let us know how it went. This call also helps us know that you received your equipment and that everything is ready. Please use our central phone number [phone]    Contact information for [employer] company:    [employer] Tel: [phone]    Pressure Desensitization Protocol    1.  Put the mask on your face without putting the straps on while doing an activity that you enjoy such as watching TV, listening to the radio, surfing the Internet, or reading.  Try to do this for 15 minutes a day for one week.    2.  Put the mask on your face with the straps on while  doing activity that you enjoy.  Try to do this for 15 minutes a day for another week.    3.  Put the mask on and attach the hose to the machine and turn on the machine.  Try to focus on activities that you enjoy for 15 minutes.  Perform this activity for one week.    4.  The mask on and attach the hose in the machine while doing an activity as you enjoy for 30 minutes.  Try this for one week.    5.  Repeat step 4.  Until you can increase the hours of usage to 2 hours.    May go back to previous steps if there is any discomfort at any time.  Also try to sleep with the machine every night for as long as you can tolerate it.    Please bring your machine, mask, hose and power cord on the next clinical visit with me. Thank you.

## 2021-06-19 NOTE — PROGRESS NOTES
Nadir Cantu  1950      Assessment and Plan:  1 subungual sliver foreign body removed from third finger left hand without problem  2 referral to hand surgeon chronic problems with right thumb some hyperlaxity and tendinitis      Chief Complaint: Foreign body and thumb problems    Visit diagnoses:    1. Thumb pain, right  Ambulatory referral to Orthopedics   2. Acute foreign body of middle finger, initial encounter         Meds:  Current Outpatient Prescriptions   Medication Sig Dispense Refill     albuterol (PROAIR HFA;PROVENTIL HFA;VENTOLIN HFA) 90 mcg/actuation inhaler Inhale 2 puffs every 6 (six) hours as needed for wheezing or shortness of breath. 1 Inhaler 12     aluminum hydrox-magnesium carb 160-105 mg Chew Chew.       aluminum-magnesium hydroxide 200-200 mg/5 mL suspension Take 30 mL by mouth 4 (four) times a day as needed for indigestion.       aspirin (ASPIRIN LOW DOSE) 81 MG EC tablet Take by mouth 2 (two) times a day.        atorvastatin (LIPITOR) 40 MG tablet Take by mouth.       azelastine (ASTELIN) 137 mcg (0.1 %) nasal spray into each nostril.       benzonatate (TESSALON PERLES) 100 MG capsule Take 1 capsule (100 mg total) by mouth every 6 (six) hours as needed for cough. 30 capsule 0     calcium carbonate-simethicone (GAS-X WITH MAALOX) 500-125 mg Chew Chew 1 tablet daily.       calcium, as carbonate, (TUMS) 200 mg calcium (500 mg) chewable tablet Chew 1 tablet daily. Patient takes 4500 mg daily.       celecoxib (CELEBREX) 100 MG capsule Take 1 capsule (100 mg total) by mouth daily. 30 capsule 2     codeine-guaiFENesin (CHERATUSSIN AC)  mg/5 mL liquid Take 5 mL by mouth 3 (three) times a day as needed for cough. 118 mL 0     fluticasone (FLONASE) 50 mcg/actuation nasal spray INHALE 1 TO 2 SPRAYS INTO EACH NOSTRIL ONCE DAILY 48 g 2     lansoprazole (PREVACID) 30 MG capsule TAKE 1 CAPSULE BY MOUTH TWICE DAILY 180 capsule 2     lansoprazole (PREVACID) 30 MG capsule Take 2 capsules (60  mg total) by mouth daily. 30 capsule 11     LORazepam (ATIVAN) 1 MG tablet TAKE 1 TABLET (1 MG TOTAL) BY MOUTH AS NEEDED FOR ANXIETY. 60 tablet 2     losartan (COZAAR) 50 MG tablet Take 1.5 tablets daily 45 tablet 9     methylcellulose (CITRUCEL) 500 mg Tab Take 500 mg by mouth daily. Take 6 tablets by mouth daily.       metoclopramide (REGLAN) 10 MG tablet Take by mouth.       metroNIDAZOLE (METROGEL) 1 % gel Apply 1 application topically daily. Apply sparingly to affected area(s) once daily.       ranitidine (ZANTAC) 150 MG tablet Take 1 tablet (150 mg total) by mouth 2 (two) times a day. 60 tablet 9     simethicone (GAS-X) 80 MG chewable tablet Chew.       triamcinolone (KENALOG) 0.1 % cream Apply to rash twice daily 60 g 1     No current facility-administered medications for this visit.        Allergies   Allergen Reactions     Asa-Acetaminophen-Caff-Buffers Nausea And Vomiting     Aspirin      Niacin      Tetanus And Diphtheria Toxoids, Adsorbed, Adult Angiodema     Tongue swells up     Tetanus Toxoid, Adsorbed Swelling     Tetanus Vaccines And Toxoid        ROS: complete review of symptoms otherwise negative except as noted below    S: He has a sliver under the third finger left hand nail bed no infection he also wants to see a specialist regarding his right thumb       O:   Vitals:    07/23/18 1108   BP: 126/68   Patient Site: Left Arm   Patient Position: Sitting   Cuff Size: Adult Regular   Pulse: 74   SpO2: 97%   Weight: (!) 248 lb 2 oz (112.5 kg)       Physical Exam:  General-  VS- see above    Musculoskeletal-the sliver was removed without problem from his left third finger his right thumb show some hyperlaxity and discomfort and some swelling over the IP joint will refer to hand specialist            Adolfo Zhu MD

## 2021-06-19 NOTE — LETTER
Letter by Adolfo Kraus MD at      Author: Adolfo Kraus MD Service: -- Author Type: --    Filed:  Encounter Date: 4/3/2019 Status: (Other)         [facility] INTERNAL MEDICINE  04/03/19    Patient: Nadir Cantu  YOB: 1950  Medical Record Number: [id number]  CSN: [id number]                                                                              Non-opioid Controlled Substance Agreement    I understand that my care provider has prescribed a controlled substance to help manage my condition(s). I am taking this medicine to help me function or work. I know this is strong medicine, and that it can cause serious side effects. Controlled substances can be sedating, addicting and may cause a dependency on the drug. They can affect my ability to drive or think, and cause depression. They need to be taken exactly as prescribed. Combining controlled substances with certain medicines or chemicals (such as cocaine, sedatives and tranquilizers, sleeping pills, meth) can be dangerous or even fatal. Also, if I stop controlled substances suddenly, I may have severe withdrawal symptoms.  If not helpful, I may be asked to stop them.    The risks, benefits, and side effects of these medicine(s) were explained to me. I agree that:    1. I will take part in other treatments as advised by my care team. This may be psychiatry or counseling, physical therapy, behavioral therapy, group treatment or a referral to a pain clinic. I will reduce or stop my medicine when my care team tells me to do so.  2. I will take my medicines as prescribed. I will not change the dose or schedule unless my care team tells me to. There will be no refills if I run out early.  I may be contactedwithout warning and asked to complete a urine drug test or pill count at any time.   3. I will keep all my appointments, and understand this is part of the monitoring of controlled substances. My care team may require an office visit for EVERY controlled  substance refill. If I miss appointments or dont follow instructions, my care team may stop my medicine.  4. I will not ask other providers to prescribe controlled substances, and I will not accept controlled substances from other people. If I need another prescribed controlled substance for a new reason, I will tell my care team within 1 business day.  5. I will use one pharmacy to fill all of my controlled substance prescriptions, and it is up to me to make sure that I do not run out of my medicines on weekends or holidays. If my care team is willing to refill my controlled substance prescription without a visit, I must request refills only during office hours, refills may take up to 3 days to process, and it may take up to 5 to 7 days for my medicine to be mailed and ready at my pharmacy. Prescriptions will not be mailed anywhere except my pharmacy.    6. I am responsible for my prescriptions. If the medicine/prescription is lost or stolen, it will not be replaced. I also agree not to share controlled substance medicines with anyone.          [facility] INTERNAL MEDICINE  04/03/19  Patient:  Nadir Cantu  YOB: 1950  Medical Record Number: [id number]  CSN: [id number]    7. I agree to not use ANY illegal or recreational drugs. This includes marijuana, cocaine, bath salts or other drugs. I agree not to use alcohol unless my care team says I may. I agree to give urine samples whenever asked. If I dont give a urine sample, the care team may stop my medicine.    8. If I enroll in the [place] Medical Marijuana program, I will tell my care team. I will also sign an agreement to share my medical records with my care team.    9. I will bring in my list of medicines (or my medicine bottles) each time I come to the clinic.   10. I will tell my care team right away if I become pregnant or have a new medical problem treated outside of my regular clinic.  11. I understand that this medicine can affect my thinking and  judgment. It may be unsafe for me to drive, use machinery and do dangerous tasks. I will not do any of these things until I know how the medicine affects me. If my dose changes, I will wait to see how it affects me. I will contact my care team if I have concerns about medicine side effects.    I understand that if I do not follow any of the conditions above, my prescriptions or treatment may be stopped.      I agree that my provider, clinic care team, and pharmacy may work with any city, state or federal law enforcement agency that investigates the misuse, sale, or other diversion of my controlled medicine. I will allow my provider to discuss my care with or share a copy of this agreement with any other treating provider, pharmacy or emergency room where I receive care. I agree to give up (waive) any right of privacy or confidentiality with respect to these consents.   I have read this agreement and have asked questions about anything I did not understand.    ___________________________________________________________________________  Patient signature - Date/Time  -Nadir Cantu                                      ___________________________________________________________________________  Witness signature                                                                    ___________________________________________________________________________  Provider signature- Adolfo Kraus MD

## 2021-06-19 NOTE — LETTER
Letter by Adolfo Kraus MD at      Author: Adolfo Kraus MD Service: -- Author Type: --    Filed:  Encounter Date: 7/2/2019 Status: (Other)         Nadir Cantu  [address] [facility] [address]       July 2, 2019       Dear Mr. Cantu,    Below are the results from your recent visit:    Resulted Orders   Glycosylated Hemoglobin A1c   Result Value Ref Range    Hemoglobin A1c 6.4 (H) 3.5 - 6.0 %   PSA, Annual Screen (Prostatic-Specific Antigen)   Result Value Ref Range    PSA 1.5 0.0 - 4.5 ng/mL    Narrative    Method is Abbott Prostate-Specific Antigen (PSA)  Standard-WHO 1st International (90:10)   Lipid Profile   Result Value Ref Range    Triglycerides 132 <=149 mg/dL    Cholesterol 137 <=199 mg/dL    LDL Calculated 73 <=129 mg/dL    HDL Cholesterol 38 (L) >=40 mg/dL    Patient Fasting > 8hrs? Yes    Comprehensive Metabolic Panel   Result Value Ref Range    Sodium 138 136 - 145 mmol/L    Potassium 4.3 3.5 - 5.0 mmol/L    Chloride 105 98 - 107 mmol/L    CO2 21 (L) 22 - 31 mmol/L    Anion Gap, Calculation 12 5 - 18 mmol/L    Glucose 107 70 - 125 mg/dL    BUN 19 8 - 22 mg/dL    Creatinine 0.84 0.70 - 1.30 mg/dL    GFR MDRD Af Amer >60 >60 mL/min/1.73m2    GFR MDRD Non Af Amer >60 >60 mL/min/1.73m2    Bilirubin, Total 0.8 0.0 - 1.0 mg/dL    Calcium 10.4 8.5 - 10.5 mg/dL    Protein, Total 7.2 6.0 - 8.0 g/dL    Albumin 4.4 3.5 - 5.0 g/dL    Alkaline Phosphatase 44 (L) 45 - 120 U/L    AST 17 0 - 40 U/L    ALT 26 0 - 45 U/L    Narrative    Fasting Glucose reference range is 70-99 mg/dL per  American Diabetes Association (ADA) guidelines.   HM1 (CBC with Diff)   Result Value Ref Range    WBC 6.2 4.0 - 11.0 thou/uL    RBC 4.64 4.40 - 6.20 mill/uL    Hemoglobin 14.4 14.0 - 18.0 g/dL    Hematocrit 42.4 40.0 - 54.0 %    MCV 91 80 - 100 fL    MCH 30.9 27.0 - 34.0 pg    MCHC 33.9 32.0 - 36.0 g/dL    RDW 13.0 11.0 - 14.5 %    Platelets 273 140 - 440 thou/uL    MPV 8.2 7.0 - 10.0 fL    Neutrophils % 65 50 - 70 %     Lymphocytes % 25 20 - 40 %    Monocytes % 6 2 - 10 %    Eosinophils % 3 0 - 6 %    Basophils % 1 0 - 2 %    Neutrophils Absolute 4.0 2.0 - 7.7 thou/uL    Lymphocytes Absolute 1.5 0.8 - 4.4 thou/uL    Monocytes Absolute 0.4 0.0 - 0.9 thou/uL    Eosinophils Absolute 0.2 0.0 - 0.4 thou/uL    Basophils Absolute 0.0 0.0 - 0.2 thou/uL       Your tests are good.  The prostate PSA test is good.  The minor abnormalities are not significant.   Please call with questions or contact us using [employer].    Sincerely,        Electronically signed by Adolfo Kraus MD

## 2021-06-20 NOTE — LETTER
Letter by Adolfo Kraus MD at      Author: Adolfo Kraus MD Service: -- Author Type: --    Filed:  Encounter Date: 8/7/2020 Status: (Other)         Nadir Cantu  [address] [facility] [address]       August 7, 2020     Dear Mr. Cantu,    Below are the results from your recent visit:    Resulted Orders   Lipid Profile   Result Value Ref Range    Triglycerides 126 <=149 mg/dL    Cholesterol 130 <=199 mg/dL    LDL Calculated 66 <=129 mg/dL    HDL Cholesterol 39 (L) >=40 mg/dL    Patient Fasting > 8hrs? Yes    Glycosylated Hemoglobin A1c   Result Value Ref Range    Hemoglobin A1c 7.0 (H) <=5.6 %      Comment:      Normal <5.7% Prediabete 5.7-6.4% Diabletes 6.5% or higher - adopted from ADA consensus guidelines   Comprehensive Metabolic Panel   Result Value Ref Range    Sodium 138 136 - 145 mmol/L    Potassium 4.1 3.5 - 5.0 mmol/L    Chloride 99 98 - 107 mmol/L    CO2 26 22 - 31 mmol/L    Anion Gap, Calculation 13 5 - 18 mmol/L    Glucose 118 70 - 125 mg/dL    BUN 19 8 - 28 mg/dL    Creatinine 0.88 0.70 - 1.30 mg/dL    GFR MDRD Af Amer >60 >60 mL/min/1.73m2    GFR MDRD Non Af Amer >60 >60 mL/min/1.73m2    Bilirubin, Total 0.8 0.0 - 1.0 mg/dL    Calcium 9.9 8.5 - 10.5 mg/dL    Protein, Total 7.4 6.0 - 8.0 g/dL    Albumin 4.3 3.5 - 5.0 g/dL    Alkaline Phosphatase 54 45 - 120 U/L    AST 16 0 - 40 U/L    ALT 21 0 - 45 U/L    Narrative    Fasting Glucose reference range is 70-99 mg/dL per  American Diabetes Association (ADA) guidelines.       Your tests are overall satisfactory.  The A1c is up some and so I am glad you are going to try the metformin.      Please call with questions or contact us using [employer].    Sincerely,        Electronically signed by Adolfo Kraus MD

## 2021-06-21 NOTE — LETTER
Letter by Adolfo Kraus MD at      Author: Adolfo Kraus MD Service: -- Author Type: --    Filed:  Encounter Date: 3/17/2021 Status: (Other)         Nadir Cantu  [address] [facility] [address]     March 17, 2021     Dear Mr. Cantu,    Below are the results from your recent visit:    Resulted Orders   Echo Complete   Result Value Ref Range    LV volume diastolic 75 62.0 - 150.0 cm3    LV volume systolic 37 21.0 - 61.0 cm3    HR 73 bpm    IVSd 1.5 (!) 0.6 - 1.0 cm    LVIDd 3.1 (!) 4.2 - 5.8 cm    LVIDs 2.2 (!) 2.5 - 4.0 cm    LVOT diam 2 cm    LVOT mean gradient 2 mmHg    LVOT peak VTI 18.4 cm    LVOT mean charito 59.3 cm/s    LVOT peak charito 85 cm/s    LVOT peak gradient 3 mmHg    LV PWd 1.3 (!) 0.6 - 1.0 cm    MV E' lat charito 7.31 cm/s    MV E' med charito 5.26 cm/s    AV mean charito 89.4 cm/s    AV mean gradient 4 mmHg    AV VTI 22.7 cm    AV peak charito 132 cm/s    AO root 3.3 cm    AO ascending 3 cm    LA size 3.3 cm    LA/AO root ratio 1 no units    MV decel time 331 ms    MV peak A charito 91.3 cm/s    MV peak E charito 65.6 cm/s    LA area 2 13.8 cm2    LA area 1 18 cm2    LA length 5.61 cm    TAPSE 2.5 cm    BSA 2.35 m2    Hieght 73 in    Weight 3,776 lbs    /80 mmHg    IVS/PW ratio 1.2     LV FS 29.0 28.0 - 44.0 %    Echo LVEF calculated 51 55 - 75 %    LA volume 37.6 mL    LV mass 146.7 g    AV area 2.5 cm2    LVOT area 3.14 cm2    LVOT SV 57.8 cm3    LV systolic volume index 15.7 11.0 - 31.0 cm3/m2    LV diastolic volume index 31.9 34.0 - 74.0 cm3/m2    LA volume index 16.0 mL/m2    LV mass index 62.4 g/m2    LV SVi 24.6 ml/m2    LV CO 4.2 l/min    LV Ci 1.8 l/min/m2    Height 73.0 in    Weight 236 lbs    AV DIM IND VTI 0.8     AV DIM IND charito 0.6     MV E/A Ratio 0.7     AV peak gradient 7.0 mmHg    MV med E/e' ratio 12.5     MV lat E/e' ratio 9.0     MV Avg E/e' Ratio 10.4 cm/s    Echo LVEF Estimated 55 %    Narrative      When compared to the previous study dated 10/13/2019, no significant   change. No  significant valvular abnormalities are noted on screening   Doppler exam.    Left ventricle ejection fraction is normal. The estimated left   ventricular ejection fraction is 55%.    Normal left ventricular size and systolic function.    Normal right ventricular size and systolic function.    Mild concentric hypertrophy noted.    No hemodynamically significant valvular heart abnormalities.          Your heart Ultrasound looks good, no changes of concern.  No explanation for shortness of breath.     Please call with questions or contact us using [employer].    Sincerely,        Electronically signed by Adolfo Kraus MD

## 2021-06-21 NOTE — LETTER
Letter by Adolfo Kraus MD at      Author: Adolfo Kraus MD Service: -- Author Type: --    Filed:  Encounter Date: 10/19/2020 Status: (Other)         Nadir Cantu  [address] [facility] [address]       October 19, 2020     Dear Mr. Cantu,    Below are the results from your recent visit:    Resulted Orders   Microalbumin, Random Urine   Result Value Ref Range    Microalbumin, Random Urine 2.14 (H) 0.00 - 1.99 mg/dL    Creatinine, Urine 153.9 mg/dL    Microalbumin/Creatinine Ratio Random Urine 13.9 <=19.9 mg/g    Narrative    Microalbumin, Random Urine  <2.0 mg/dL . . . . . . . . Normal  3.0-30.0 mg/dL . . . . . . Microalbuminuria  >30.0 mg/dL . . . . . .  . Clinical Proteinuria    Microalbumin/Creatinine Ratio, Random Urine  <20 mg/g . . . . .. . . . Normal   mg/g . . . . . . . Microalbuminuria  >300 mg/g . . . . . . . . Clinical Proteinuria       Glycosylated Hemoglobin A1c   Result Value Ref Range    Hemoglobin A1c 7.4 (H) <=5.6 %      Comment:      Normal <5.7% Prediabete 5.7-6.4% Diabletes 6.5% or higher - adopted from ADA consensus guidelines       Your tests are satisfactory.  The slightly high hemoglobin A1c and random urine microalbumin are not significant.   Please call with questions or contact us using [employer].    Sincerely,        Electronically signed by Adolfo Kraus MD

## 2021-06-21 NOTE — PROGRESS NOTES
" ASSESSMENT AND PLAN:    1. Obstructive apnea  Not treating, was mild.  No attacks of daytime somnolence, does take naps.     2. Type 2 diabetes mellitus without complication, without long-term current use of insulin (H)  Clinically not having symptomatic hyperglycemia or hypoglycemia.  No change for now.   - Comprehensive Metabolic Panel  - Glycosylated Hemoglobin A1c    3. Class 2 obesity due to excess calories with body mass index (BMI) of 333 in adult,   We discussed the value of further weight loss - 1-15 pounds.      4. Exercise intolerance  Noted CATHERINE.  This is not new, but seems progressive.  Did see cardiology recently.  No orthopnea, or cough, or chest pain.  Coronary CTA and stress testing have been negative.  He does have non-occlusive CAD.  EF has been on high side 75%.  Possibly there is progressing diastolic dysfunction, perhaps related to STEVE, or from obesity.  Will get lab testing and echocardiogram.  No change in medications for now.  We discussed a walking exercise program.    - Thyroid Stimulating Hormone (TSH)  - T4, Free  - Vitamin D, Total (25-Hydroxy)  - Echo Complete; Future  - HM2(CBC w/o Differential)    5. Flu vaccine need  - Influenza High Dose, Seasonal    6. Peripheral neuropathy  Symptoms of feet tingling are very suggestive.  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  Discussed how this can precede and outpace type 2 DM.      CHIEF COMPLAINT:  Chief Complaint   Patient presents with     Shortness of Breath     x 2 years - off and on     feet     x 15 years \"ampllifying\"     HISTORY OF PRESENT ILLNESS:  Nadir Cantu is a 68 y.o. male with increasing exercise intolerance, and CATHERINE.  No LE edema, or orthopnea, or chest pain, or cough, or fever.  No pre-syncope or palpitations.  He does not feel depressed.  He continues to teach and enjoys it.  Sleeps well.  Is a cipriano, no daytime attacks of somnolence.  No headache or fever.  No " diarrhea or nausea or emesis.  Not much exercise.      REVIEW OF SYSTEMS:   See HPI, all other systems on review are negative.    Active Ambulatory Problems     Diagnosis Date Noted     Irritable Bowel Syndrome      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      Chronic Reflux Esophagitis      Obesity      Obstructive apnea      Mixed Hyperlipoproteinemia      Chronic Pain Due To Trauma      Cervicalgia      Type 2 diabetes mellitus without complication (H)      Hearing Loss      Left Rotator Cuff Tendonitis      Aspirin intolerance 11/23/2016     Asthma      Back pain 03/28/2018     Peripheral vascular disease (H)      Resolved Ambulatory Problems     Diagnosis Date Noted     Urinary Frequency More Than Twice At Night (Nocturia)      Testicular Pain      Fatigue      Palpitations      Chest Pain      Arthralgia      Myalgia And Myositis      Dyslipidemia      Sinusitis      Lower Back Pain      Blood Pressure Isolated Elevated      Retinal Vasculitis Of The Right Eye      Bacterial Endocarditis      Ischemic Stroke      Headache      Impaired fasting glucose      Blurry Vision      Carotid Artery Occlusion      Bronchitis      Lightheadedness      Rheumatoid Vasculitis      Eye Pain      Acute Colitis      Cataract      Acute pharyngitis      Anemia, unspecified 12/03/2014     Carotid artery narrowing 05/12/2014     HLD (hyperlipidemia) 05/12/2014     Obstructive apnea 12/15/2014     Tachycardia 11/03/2016     Past Medical History:   Diagnosis Date     Anxiety      Asthma      Chronic back pain      Diabetes mellitus (H)      Hearing loss      Hyperlipidemia      Hypertension      Obesity      STEVE (obstructive sleep apnea)      Peripheral vascular disease (H)      Reflux esophagitis      Salmonella dysentery 2014     Past Surgical History:   Procedure Laterality Date     CAROTID ENDARTERECTOMY  2013     CATARACT OS  09/21/2014     RI APPENDECTOMY      Description: Appendectomy;  Recorded: 05/22/2009;     RI  BIOPSY OF SKIN LESION      Description: Biopsy Skin;  Recorded: 05/22/2009;     MT LAP,CHOLECYSTECTOMY      Description: Cholecystectomy Laparoscopic;  Recorded: 02/14/2011;     VITALS:  Vitals:    11/14/18 1319   BP: 110/80   Patient Site: Left Arm   Patient Position: Sitting   Cuff Size: Adult Large   Pulse: 77   SpO2: 95%   Weight: (!) 246 lb 1.6 oz (111.6 kg)     Wt Readings from Last 3 Encounters:   11/14/18 (!) 246 lb 1.6 oz (111.6 kg)   09/17/18 (!) 243 lb (110.2 kg)   07/23/18 (!) 248 lb 2 oz (112.5 kg)     PHYSICAL EXAM:  Constitutional:  Well appearing in NAD, alert and oriented  Neck:  Supple, no significant adenopathy.  Cardiac:  S1 S2 without murmur, rhythm regular  Lungs: Clear to auscultation, without wheezes or rales  Abdomen:   Soft, flat and non-tender, without guarding, rebound, or mass.    Extremities: Joints without effusion or inflammation, distal pulses diminished, no significant edema  Neurologic:  Speech clear, motor intact, no focal findings, gait normal     Psychiatric:  Mood appropriate, memory intact.     DECISION TO OBTAIN OLD RECORDS AND/OR OBTAIN HISTORY FROM SOMEONE OTHER THAN PATIENT, AND/OR ACCESSING CARE EVERYWHERE):  1  0     REVIEW AND SUMMARIZATION OF OLD RECORDS, AND/OR OBTAINING HISTORY FROM SOMEONE OTHER THAN PATIENT, AND/OR DISCUSSION OF CASE WITH ANOTHER HEALTH CARE PROVIDER:  2 reviewed cardiology evaluation    REVIEW AND/OR ORDER OF OF CLINICAL LAB TESTS: 1  0.    REVIEW AND/OR ORDER OF RADIOLOGY TESTS: 1 reviewed CTA coronaries, and CT chest.    REVIEW AND/OR ORDER OF MEDICAL TESTS (EKG/ECHO/COLONOSCOPY/EGD): 1  Reviewed stress test    INDEPENDENT  VISUALIZATION OF IMAGE, TRACING, OR SPECIMEN ITSELF (2 EACH):  0     TOTAL: 4    Current Outpatient Medications   Medication Sig Dispense Refill     albuterol (PROAIR HFA;PROVENTIL HFA;VENTOLIN HFA) 90 mcg/actuation inhaler Inhale 2 puffs every 6 (six) hours as needed for wheezing or shortness of breath. 1 Inhaler 12      aluminum hydrox-magnesium carb 160-105 mg Chew Chew.       aluminum-magnesium hydroxide 200-200 mg/5 mL suspension Take 30 mL by mouth 4 (four) times a day as needed for indigestion.       aspirin (ASPIRIN LOW DOSE) 81 MG EC tablet Take by mouth 2 (two) times a day.        atorvastatin (LIPITOR) 40 MG tablet Take by mouth.       azelastine (ASTELIN) 137 mcg (0.1 %) nasal spray into each nostril.       calcium carbonate-simethicone (GAS-X WITH MAALOX) 500-125 mg Chew Chew 1 tablet daily.       calcium, as carbonate, (TUMS) 200 mg calcium (500 mg) chewable tablet Chew 1 tablet daily. Patient takes 4500 mg daily.       fluticasone (FLONASE) 50 mcg/actuation nasal spray INHALE 1 TO 2 SPRAYS INTO EACH NOSTRIL ONCE DAILY 48 g 2     lansoprazole (PREVACID) 30 MG capsule Take 2 capsules (60 mg total) by mouth daily. 30 capsule 11     LORazepam (ATIVAN) 1 MG tablet TAKE 1 TABLET (1 MG TOTAL) BY MOUTH AS NEEDED FOR ANXIETY. 60 tablet 2     losartan (COZAAR) 50 MG tablet Take 1.5 tablets daily 45 tablet 9     methylcellulose (CITRUCEL) 500 mg Tab Take 500 mg by mouth daily. Take 6 tablets by mouth daily.       metroNIDAZOLE (METROGEL) 1 % gel Apply 1 application topically daily. Apply sparingly to affected area(s) once daily.       ranitidine (ZANTAC) 150 MG tablet Take 1 tablet (150 mg total) by mouth 2 (two) times a day. 60 tablet 9     simethicone (GAS-X) 80 MG chewable tablet Chew.       benzonatate (TESSALON PERLES) 100 MG capsule Take 1 capsule (100 mg total) by mouth every 6 (six) hours as needed for cough. (Patient not taking: Reported on 11/14/2018.      ) 30 capsule 0     celecoxib (CELEBREX) 100 MG capsule Take 1 capsule (100 mg total) by mouth daily. (Patient not taking: Reported on 11/14/2018.      ) 30 capsule 2     codeine-guaiFENesin (CHERATUSSIN AC)  mg/5 mL liquid Take 5 mL by mouth 3 (three) times a day as needed for cough. (Patient not taking: Reported on 11/14/2018.      ) 118 mL 0     gabapentin  (NEURONTIN) 300 MG capsule   0     lansoprazole (PREVACID) 30 MG capsule TAKE 1 CAPSULE BY MOUTH TWICE DAILY 180 capsule 2     metoclopramide (REGLAN) 10 MG tablet Take by mouth.       triamcinolone (KENALOG) 0.1 % cream Apply to rash twice daily (Patient not taking: Reported on 11/14/2018.      ) 60 g 1     No current facility-administered medications for this visit.      Adolfo Kraus MD  Internal Medicine  [facility]

## 2021-06-21 NOTE — LETTER
Letter by Adolfo Kraus MD at      Author: Adolfo Kraus MD Service: -- Author Type: --    Filed:  Encounter Date: 2/18/2021 Status: (Other)         [facility]  02/18/21    Patient: Nadir Cantu  YOB: 1950  Medical Record Number: [id number]  CSN: [id number]                                                                              Non-opioid Controlled Substance Agreement    I understand that my care provider has prescribed a controlled substance to help manage my condition(s). I am taking this medicine to help me function or work. I know this is strong medicine, and that it can cause serious side effects. Controlled substances can be sedating, addicting and may cause a dependency on the drug. They can affect my ability to drive or think, and cause depression. They need to be taken exactly as prescribed. Combining controlled substances with certain medicines or chemicals (such as cocaine, sedatives and tranquilizers, sleeping pills, meth) can be dangerous or even fatal. Also, if I stop controlled substances suddenly, I may have severe withdrawal symptoms.  If not helpful, I may be asked to stop them.    The risks, benefits, and side effects of these medicine(s) were explained to me. I agree that:    1. I will take part in other treatments as advised by my care team. This may be psychiatry or counseling, physical therapy, behavioral therapy, group treatment or a referral to a pain clinic. I will reduce or stop my medicine when my care team tells me to do so.  2. I will take my medicines as prescribed. I will not change the dose or schedule unless my care team tells me to. There will be no refills if I run out early.  I may be contactedwithout warning and asked to complete a urine drug test or pill count at any time.   3. I will keep all my appointments, and understand this is part of the monitoring of controlled substances. My care team may require an office visit  for EVERY controlled substance refill. If I miss appointments or dont follow instructions, my care team may stop my medicine.  4. I will not ask other providers to prescribe controlled substances, and I will not accept controlled substances from other people. If I need another prescribed controlled substance for a new reason, I will tell my care team within 1 business day.  5. I will use one pharmacy to fill all of my controlled substance prescriptions, and it is up to me to make sure that I do not run out of my medicines on weekends or holidays. If my care team is willing to refill my controlled substance prescription without a visit, I must request refills only during office hours, refills may take up to 3 days to process, and it may take up to 5 to 7 days for my medicine to be mailed and ready at my pharmacy. Prescriptions will not be mailed anywhere except my pharmacy.    6. I am responsible for my prescriptions. If the medicine/prescription is lost or stolen, it will not be replaced. I also agree not to share controlled substance medicines with anyone.          [facility]  02/18/21  Patient:  Nadir Cantu  YOB: 1950  Medical Record Number: [id number]  CSN: [id number]    7. I agree to not use ANY illegal or recreational drugs. This includes marijuana, cocaine, bath salts or other drugs. I agree not to use alcohol unless my care team says I may. I agree to give urine samples whenever asked. If I dont give a urine sample, the care team may stop my medicine.    8. If I enroll in the [place] Medical Marijuana program, I will tell my care team. I will also sign an agreement to share my medical records with my care team.    9. I will bring in my list of medicines (or my medicine bottles) each time I come to the clinic.   10. I will tell my care team right away if I become pregnant or have a new medical problem treated outside of my regular clinic.  11. I understand that  this medicine can affect my thinking and judgment. It may be unsafe for me to drive, use machinery and do dangerous tasks. I will not do any of these things until I know how the medicine affects me. If my dose changes, I will wait to see how it affects me. I will contact my care team if I have concerns about medicine side effects.    I understand that if I do not follow any of the conditions above, my prescriptions or treatment may be stopped.      I agree that my provider, clinic care team, and pharmacy may work with any city, state or federal law enforcement agency that investigates the misuse, sale, or other diversion of my controlled medicine. I will allow my provider to discuss my care with or share a copy of this agreement with any other treating provider, pharmacy or emergency room where I receive care. I agree to give up (waive) any right of privacy or confidentiality with respect to these consents.   I have read this agreement and have asked questions about anything I did not understand.    ___________________________________________________________________________  Patient signature - Date/Time  -Nadir Cantu                                      ___________________________________________________________________________  Witness signature                                                                    ___________________________________________________________________________  Provider signature- Adolfo Kraus MD

## 2021-06-22 NOTE — PROGRESS NOTES
RESPIRATORY CARE NOTE     Patient Name: Nadir Cantu  Today's Date: 12/18/2018     Six minute walk and Complete PFT done. Pt performed tests with good effort, 2.5 mg Albuterol neb given. Test results meet ATS criteria. Results scanned into epic. Pt left in no distress.       Santa Martinez

## 2021-06-23 NOTE — PATIENT INSTRUCTIONS - HE
1. Arnuity 200 mcg one puff daily , rinse your mouth after each use  2. Albuterol HFA two puffs every 4 hours and before activities as needed  3. Continue flonase two sprays each nostril daily  4. Continue astelin two sprays each nostril daily  5. Lansoprazole 1 tab twice a day (morning and supper)  6. Ranitidine before bedtime  7. Referral to physical therapy  8. Continue CPAP therapy   9. Contact me in 3 weeks  10. Follow up in 6 months

## 2021-06-23 NOTE — PROGRESS NOTES
"PULMONARY OUTPATIENT FOLLOW UP NOTE    Assessment:     1. Asthma mild persistent  Reports chest tightness and exertional dyspnea. FENO 35.  Resume Inhale corticosteroid (ICS). Albuterol HFA as needed  2. Allergic rhinitis   Nasal steroid and astelin.   3. STEVE on CPAP  4. GERD  Raise the head of the bed, avoid eating close to bedtime at least 3 hours.   Continue PPI two times a day and H2 blocker at bedtime   5. Obesity     Plan:   1. Arnuity 200 mcg one puff daily , rinse your mouth after each use  2. Albuterol HFA two puffs every 4 hours and before activities as needed  3. Continue flonase two sprays each nostril daily  4. Continue astelin two sprays each nostril daily  5. Lansoprazole 1 tab twice a day (morning and supper)  6. Ranitidine before bedtime  7. Referral to physical therapy  8. Continue CPAP therapy   9. Contact me in 3 weeks  10. Follow up in 6 months     Boy Jesus  Pulmonary / Critical Care  2/6/2019      CC:      Chief Complaint   Patient presents with     Asthma     Follow Up       HPI:       Nadir Cantu is an 69 y.o. male who presents for follow up.   Patient has history of allergic rhinitis, asthma, HTN, STEVE on CPAP, GERD.  Reports chest tightness and shortness of breath.   No cough or wheezes.   Stopped using ICS.   Denies chest pain. No swelling of LEs, orthopnea or PND.  Uses nasal steroid spray for history of allergic rhinitis. Reports mild nasal congestion, no significant postnasal drip.   Has dog, cat, parrots since 1994.    History of acid reflux, takes PPI and H2 blocker, recentesophagogram showed hiatal hernia and acid reflux.  Denies tobacco use in the past.   History of sleep apnea, on CPAP therapy, refresh in AM. Patient is followed by Dr. Valdivia.     Past Medical History:   Diagnosis Date     Anxiety      Asthma      Chronic back pain      Diabetes mellitus (H)     \"pre-DM\"     Hearing loss      Hyperlipidemia      Hypertension      Obesity      STEVE (obstructive " sleep apnea)      Peripheral vascular disease (H)      Reflux esophagitis      Salmonella dysentery 2014     Medications:     Prior to Admission medications    Medication Sig Start Date End Date Taking? Authorizing Provider   aluminum-magnesium hydroxide 200-200 mg/5 mL suspension Take 30 mL by mouth 4 (four) times a day as needed for indigestion.   Yes Andrew Saavedra MD   atorvastatin (LIPITOR) 40 MG tablet TAKE 1 TABLET BY MOUTH DAILY 9/5/16  Yes Andrew Saavedra MD   calcium carbonate-simethicone (GAS-X WITH MAALOX) 500-125 mg Chew Chew 1 tablet daily.   Yes Andrew Saavedra MD   calcium, as carbonate, (TUMS) 200 mg calcium (500 mg) chewable tablet Chew 1 tablet daily. Patient takes 4500 mg daily.   Yes Darrell GREGORIO MD   cyclobenzaprine (FLEXERIL) 5 MG tablet Take 5 mg by mouth 2 (two) times a day as needed for muscle spasms.   Yes PROVIDER, HISTORICAL   fexofenadine (ALLEGRA) 180 MG tablet Take 180 mg by mouth daily as needed.   Yes Andrew Saavedra MD   fluticasone (FLONASE) 50 mcg/actuation nasal spray INHALE 1 TO 2 SPRAYS INTO EACH NOSTRIL ONCE DAILY 9/19/16  Yes Andrew Saavedra MD   lansoprazole (PREVACID) 30 MG capsule TAKE 1 CAPSULE BY MOUTH TWICE DAILY 7/1/16  Yes PROVIDER, HISTORICAL   lansoprazole (PREVACID) 30 MG capsule TAKE 1 CAPSULE BY MOUTH TWICE DAILY 1/11/17  Yes Andrew Saavedra MD   LORazepam (ATIVAN) 1 MG tablet TAKE 1 TABLET (1 MG TOTAL) BY MOUTH AS NEEDED FOR ANXIETY. 11/16/16  Yes Andrew Saavedra MD   losartan (COZAAR) 25 MG tablet Take 1 tablet (25 mg total) by mouth daily. 12/9/16  Yes Andrew Saavedra MD   methylcellulose (CITRUCEL) 500 mg Tab Take 500 mg by mouth daily. Take 6 tablets by mouth daily.   Yes Andrew Saavedra MD   metroNIDAZOLE (METROGEL) 1 % gel Apply 1 application topically daily. Apply sparingly to affected area(s) once daily.   Yes Andrew Saavedra MD   ranitidine (ZANTAC) 150 MG tablet Take 1 tablet (150 mg total) by mouth 2 (two) times a day. 2/11/16 2/10/17 Yes Andrew Saavedra MD  "    Social History     Socioeconomic History     Marital status:      Spouse name: Not on file     Number of children: Not on file     Years of education: Not on file     Highest education level: Not on file   Social Needs     Financial resource strain: Not on file     Food insecurity - worry: Not on file     Food insecurity - inability: Not on file     Transportation needs - medical: Not on file     Transportation needs - non-medical: Not on file   Occupational History     Occupation: 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Employer: [employer]     Comment: [employer]   Tobacco Use     Smoking status: Former Smoker     Types: Cigarettes     Last attempt to quit: 1969     Years since quittin.8     Smokeless tobacco: Never Used   Substance and Sexual Activity     Alcohol use: No     Drug use: No     Sexual activity: Yes     Partners: Female   Other Topics Concern     Not on file   Social History Narrative     Not on file     Family History   Problem Relation Age of Onset     Heart disease Father      Heart disease Sister      Review of Systems  A 12 point comprehensive review of systems was negative except as noted.        Objective:     Vitals:    19 0834   BP: 112/70   Pulse: 95   SpO2: 95%   Weight: (!) 255 lb (115.7 kg)   Height: 6' 0.25\" (1.835 m)     Gen: obese, awake, alert, no distress  HEENT: pink conjunctiva, moist mucosa, Mallampati II/IV  Neck: no thyromegaly, masses or JVD  Lungs: clear  CV: regular, no murmurs or gallops appreciated  Abdomen: soft, NT, BS wnl  Ext: no edema, no clubbing or distal cyanosis  Neuro: CN II-XII intact, non focal      Diagnostic tests:   PFTs (2017)  FEV1/FVC is 90% and is normal.  FEV1 is 92% predicted and is normal.  FVC is 79% predicted and reduced.  There was no improvement in spirometry after a single inhaled dose of bronchodilator.  TLC is 97% predicted and is normal.  RV is 138% predicted and is increased.  DLCO is 96% predicted and is " normal.    Esophagogram   1. Moderate sized sliding hiatal hernia with moderate gastroesophageal reflux to the mid esophageal level.  2. Extensive tertiary contractions mid and distal esophagus. No evidence for fixed stricture or ulceration.  3. Stomach and duodenum appear to be within normal limits.  4. There appears to be a somewhat prominent cricopharyngeus muscle with the cervical esophagus otherwise normal.    Stress test (2/18/2016)  Exercise stress nuclear study is negative for inducible myocardial ischemia or infarction, despite the chest discomfort reported by the patient. EF 70%    Echocardiogram (1/10/2017)    Left ventricle ejection fraction is normal. The estimated left ventricular ejection fraction is 75%.    no significant valvular abnormalities    EXTRACARDIAC OVERREAD OF CARDIAC CT   4/25/2017 10:40 AM   INDICATION: Chest pain  COMPARISON: None.   FINDINGS:    LIMITED CHEST: Negative.  LIMITED MEDIASTINUM: Negative.  LIMITED UPPER ABDOMEN: Small hiatal hernia.  CONCLUSION:    1.  No significant incidental extracardiac findings.  2.  Please refer to cardiologist's dictation for the cardiac CT report.    Lung bases of previous abdomen CT scan done on 8/11/2014)  No pulmonary infiltrates
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
## 2021-06-25 NOTE — PROGRESS NOTES
Progress Notes by Surekha Claire MD at 7/19/2017 11:30 AM     Author: Surekha Claire MD Service: -- Author Type: Physician    Filed: 7/19/2017 12:43 PM Encounter Date: 7/19/2017 Status: Signed    : Surekha Claire MD (Physician)           Click to link to [facility] HEART CARE NOTE    Thank you, Dr. Saavedra, for asking us to see Nadir Cantu at the [facility].      Assessment/Recommendations   Assessment:    1.  Nocturnal dyspnea: Likely secondary to STEVE.  He is currently undergoing a repeat evaluation.  2.  Chest discomfort: Complaints of chest tightness during his dyspneic episodes in the past.  Cardiac workup is unremarkable without any obstructive disease noted on the CT.  3.  Hypertension: Now on losartan and blood pressure well controlled.  4.  Morbid obesity: Discussed exercise and weight loss.    Plan on follow-up in 1 year.       History of Present Illness    Mr. Nadir Cantu is a 67 y.o. male with history of peripheral arterial disease status post left carotid endarterectomy, morbid obesity, diabetes mellitus type 2, mild STEVE, hypertension and CVA being seen today in follow-up.   He was having continued shortness of breath so despite normal stress testing we proceeded with a CT coronary angiogram with calcium scoring.  This showed no evidence of obstructive coronary artery disease.  He reports that his breathing problem is now mostly noted at night.  He wakes up at night at times with his heart pounding and he also is gasping for air.  He has been wearing oximeter and in sleep his O2 sats reportedly go down to the mid 80s.  He is scheduled for a repeat sleep study.  In the past he was told that he had mild sleep apnea.  He underwent a monitor in the past and that did not show any significant arrhythmia during his symptoms.  Echocardiogram did not show any valvular abnormalities and preserved left ventricular systolic  "function without signs of pulmonary hypertension on echocardiogram.    CT coronary angiogram 4/2017    The total Agatston calcium score is 69. A calcium score in this range places the individual in the 25-50th percentile when compared to an age and gender matched control group and implies a low risk of cardiac events in the next ten years.    Coronary atherosclerosis with no severe stenosis identified    Mildly dilated proximal pulmonary arteries       Physical Examination Review of Systems   Vitals:    07/19/17 1130   BP: 120/86   Pulse: 80   Resp: 18   SpO2: 96%     Body mass index is 34.83 kg/(m^2).  Wt Readings from Last 3 Encounters:   07/19/17 (!) 264 lb (119.7 kg)   07/05/17 (!) 264 lb (119.7 kg)   06/29/17 (!) 261 lb 6.4 oz (118.6 kg)       General Appearance:   alert, no apparent distress   HEENT:  no scleral icterus; the mucous membranes are pink and moist                                  Neck: jugular venous pressure normal   Chest: the spine is straight and the chest is symmetric   Lungs:   respirations unlabored; the lungs are clear to auscultation   Cardiovascular:   regular rhythm with normal first and second heart sounds and no murmurs or gallops   Abdomen:  no organomegaly, masses, bruits, or tenderness; bowel sounds are present   Extremities: no edema   Skin: no xanthelasma    General: WNL  Eyes: WNL  Ears/Nose/Throat: WNL  Lungs: WNL  Heart: Chest Pain, Shortness of Breath with activity  Stomach: WNL  Bladder: WNL  Muscle/Joints: WNL  Skin: WNL  Nervous System: Dizziness  Mental Health: WNL     Blood: WNL     Medical History  Surgical History Family History Social History   Past Medical History:   Diagnosis Date   ? Anxiety    ? Asthma    ? Chronic back pain    ? Diabetes mellitus     \"pre-DM\"   ? Hyperlipidemia    ? Hypertension    ? Obesity    ? STEVE (obstructive sleep apnea)    ? Reflux esophagitis    ? Salmonella dysentery 2014    Past Surgical History:   Procedure Laterality Date   ? CAROTID " ENDARTERECTOMY  2013   ? CATARACT OS  09/21/2014   ? OR APPENDECTOMY      Description: Appendectomy;  Recorded: 05/22/2009;   ? OR BIOPSY OF SKIN LESION      Description: Biopsy Skin;  Recorded: 05/22/2009;   ? OR LAP,CHOLECYSTECTOMY      Description: Cholecystectomy Laparoscopic;  Recorded: 02/14/2011;    Family History   Problem Relation Age of Onset   ? Heart disease Father    ? Heart disease Sister     Social History     Social History   ? Marital status:      Spouse name: N/A   ? Number of children: N/A   ? Years of education: N/A     Occupational History   ? 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[employer]     Social History Main Topics   ? Smoking status: Former Smoker     Types: Cigarettes     Quit date: 4/25/1969   ? Smokeless tobacco: Never Used   ? Alcohol use No   ? Drug use: No   ? Sexual activity: Yes     Partners: Female     Other Topics Concern   ? Not on file     Social History Narrative          Medications  Allergies   Current Outpatient Prescriptions   Medication Sig Dispense Refill   ? albuterol (PROVENTIL HFA;VENTOLIN HFA) 90 mcg/actuation inhaler Inhale 2 puffs every 6 (six) hours as needed for wheezing or shortness of breath. 1 Inhaler 12   ? aluminum-magnesium hydroxide 200-200 mg/5 mL suspension Take 30 mL by mouth 4 (four) times a day as needed for indigestion.     ? atorvastatin (LIPITOR) 40 MG tablet Take 1 tablet (40 mg total) by mouth daily. 90 tablet 0   ? azelastine (ASTELIN) 137 mcg (0.1 %) nasal spray Use in each nostril twice daily as directed 30 mL 12   ? calcium carbonate-simethicone (GAS-X WITH MAALOX) 500-125 mg Chew Chew 1 tablet daily.     ? calcium, as carbonate, (TUMS) 200 mg calcium (500 mg) chewable tablet Chew 1 tablet daily. Patient takes 4500 mg daily.     ? fluticasone (FLONASE) 50 mcg/actuation nasal spray INHALE 1 TO 2 SPRAYS INTO EACH NOSTRIL ONCE DAILY 48 g 2   ? lansoprazole (PREVACID) 30 MG capsule TAKE 1 CAPSULE BY MOUTH TWICE DAILY 60  capsule 10   ? LORazepam (ATIVAN) 1 MG tablet TAKE 1 TABLET (1 MG TOTAL) BY MOUTH AS NEEDED FOR ANXIETY. 90 tablet 2   ? losartan (COZAAR) 50 MG tablet Take 1.5 tablets daily 45 tablet 11   ? methylcellulose (CITRUCEL) 500 mg Tab Take 500 mg by mouth daily. Take 6 tablets by mouth daily.     ? metroNIDAZOLE (METROGEL) 1 % gel Apply 1 application topically daily. Apply sparingly to affected area(s) once daily.     ? ranitidine (ZANTAC) 150 MG tablet TAKE 1 TABLET BY MOUTH TWICE A DAY 60 tablet 11   ? triamcinolone (KENALOG) 0.1 % cream Apply to rash twice daily 60 g 1     No current facility-administered medications for this visit.       Allergies   Allergen Reactions   ? Asa-Acetaminophen-Caff-Buffers Nausea And Vomiting   ? Aspirin    ? Niacin    ? Tetanus And Diphtheria Toxoids, Adsorbed, Adult Angiodema     Tongue swells up   ? Tetanus Toxoid, Adsorbed Swelling   ? Tetanus Vaccines And Toxoid          Lab Results    Chemistry/lipid CBC Cardiac Enzymes/BNP/TSH/INR   Lab Results   Component Value Date    CHOL 154 12/09/2016    HDL 34 (L) 12/09/2016    LDLCALC 87 12/09/2016    TRIG 166 (H) 12/09/2016    CREATININE 0.92 04/06/2017    BUN 20 04/06/2017    K 4.6 04/06/2017     04/06/2017     04/06/2017    CO2 24 04/06/2017    Lab Results   Component Value Date    WBC 7.8 04/06/2017    HGB 14.6 04/06/2017    HCT 43.5 04/06/2017    MCV 88 04/06/2017     04/06/2017    Lab Results   Component Value Date    CKTOTAL 82 07/25/2013    TSH 2.40 04/06/2017

## 2021-06-25 NOTE — TELEPHONE ENCOUNTER
----- Message from Ceasar Duarte RN sent at 6/3/2021  4:20 PM CDT -----  Regarding: FW: MY PT    ----- Message -----  From: Allie Anna CMA  Sent: 6/3/2021   3:55 PM CDT  To: Ceasar Duarte RN  Subject: MY PT                                            PT SAW [facility] IN MARCH, WONDERING IF HE STILL NEEDS TO SEE DAISY? ALSO, PT WONDERING HOW LONG HE NEEDS TO STAY ON THE BLOOD THINNER?    THANKS,  SAMANTHA

## 2021-06-25 NOTE — TELEPHONE ENCOUNTER
Return call from patient. Advised patient recall is in place to be seen by Dr. Jerez and patient is technically overdue for appointment. Patient said he was just seen recently by Cardiologist and wants to wait a month or two before he is seen by Dr. Anna. Encouraged patient to arrange as soon as he can. Patient asking if he needs to continue blood thinners. Per Dr. Anna's 3/25/20 office visit, patient to remain on DAPT (ASA and plavix) indefinitely. Patient verbalized understanding. No further questions at this time. Encouraged him to call should questions or concerns arise. [facility]

## 2021-06-25 NOTE — PROGRESS NOTES
Progress Notes by Surekha Claire MD at 1/16/2017  8:10 AM     Author: Surekha Claire MD Service: -- Author Type: Physician    Filed: 1/16/2017  8:55 AM Encounter Date: 1/16/2017 Status: Signed    : Surekha Claire MD (Physician)           Click to link to [facility] HEART CARE NOTE    Thank you, Dr. Saavedra, for asking us to see Nadir Cantu at the [facility].      Assessment/Recommendations   Assessment:    1.  Dyspnea with occasional chest tightness: Cardiac workup as of yet has been unremarkable including stress testing last year with event monitor and echocardiogram.  Recommended pulmonary workup which she is currently undergoing.  We'll check a CT PE protocol as well.  If workup continues to be unremarkable could consider a CT coronary angiogram.  Also suspect that deconditioning may be playing a part.  2.  Hypertension: Well-controlled.  3.  Peripheral arterial disease: Continue on aspirin and statin therapy.  4.  Follow-up in a few months.         History of Present Illness    Mr. Nadir Cantu is a 66 y.o. male with history of peripheral arterial disease status post left carotid endarterectomy, morbid obesity, diabetes mellitus type 2, mild STEVE, hypertension and CVA being seen today in follow-up.   He saw me in couple months ago complaining of worsening shortness of breath.  He had noticed worsening shortness of breath over the past year with occasional chest tightness.  He underwent a stress test back in February that was negative for inducible ischemia.  He underwent coronary angiogram 2009 that showed just mild coronary artery disease.  He was also complaining of occasional palpitations.  30 day event monitor was unremarkable.  Echocardiogram showed no significant 5 abnormalities and preserved left ventricular systolic function.  He complains of significant dyspnea on exertion.  He denies orthopnea or edema.  Occasionally  he is wheezing with the shortness of breath and feels like he can't get a full breath.    [facility] Nuclear Cardiology   Exercise Stress Test Report         Patient: Nadir Cantu   MRN: [id number]  : 1950  Primary Care Provider: Andrew Saavedra MD  Ordering Physician: Andrew Saavedra MD  Indication: Chest pain, unspecified chest pain type [R07.9] Chest pain chronic, low to mod probability of CAD  Chest pain, acute, nonspecific, low prob CAD     STRESS SUMMARY:  The patient exercised for 6 minutes and 0 seconds according to the [place] protocol, stopping due to fatigue and dyspnea. 3 out of 10 chest discomfort was reported at peak exercise with resolution by 4 minutes of recovery. The stress electrocardiogram was negative for inducible ischemia.  The peak heart rate was 151 beats per minute, which is 98% of the age predicted maximum. The blood pressure pedro from 114/80 at rest to 202/72 at the peak of exercise. The supervising cardiologist was Dr. Surekha Mariscal .    TECHNICAL COMMENTS:   Nuclear imaging was accomplished utilizing 3.98 mCi of thallium injected at rest and 43.5 mCi of technetium 99m sestamibi injected at the peak of exercise. The  images demonstrate diaphragmatic attenuation; final image quality is satisfactory.    FINDINGS: The exercise stress and rest images demonstrate normal left ventricular size and tracer uptake. The gated images reveal normal resting regional wall motion and global systolic performance. The measured resting ejection fraction is 70%.      CONCLUSION:   1. Exercise stress nuclear study is negative for inducible myocardial ischemia or infarction, despite the chest discomfort reported by the patient.    COMMENTS:  Comparison with the images of the exam of 2013 demonstrates no significant interval change.    Sandro Mcnair MD  2016 2:17 PM    Echocardiogram 1/10/17  Summary     Left ventricle ejection fraction is normal. The  estimated left ventricular ejection fraction is 75%.    no significant valvular abnormalities         PATIENT ACTIVATED MONITOR (DANYELLE)     1. Indication for study: Palpitations.  2. The monitor was worn from 11/14 through 12/13/2016. Compliance with the monitor  was fair with 70% of the recording interval having interpretable data.  3. The baseline transmission demonstrated sinus rhythm with heart rates in the 90s.  The MS interval, QRS duration and QT intervals all appear to be normal.  4. The patient had a total of 25 manual transmissions. The patient had a variety  of symptoms including fluttering in the chest, chest pain, palpitations with rapid  heart rate and lightheadedness. The patient demonstrated sinus rhythm and/or sinus  tachycardia for all recorded strips. The heart rates ranged anywhere from a low of  60 beats per minute to a high of 110 beats per minute. Only 1 episode had isolated  PACs. Otherwise, no arrhythmia was demonstrated.  5. There was no evidence of other tachyarrhythmia identified. There was no  significant bradycardia or pause.     IMPRESSION:  1. Essentially normal multiday patient activated monitor.  2. The patient's symptomatic events did not correlate with any significant  arrhythmia. Given the nature of the symptomatic complaints and the lack of  arrhythmia findings diagnosis such as vasodepressor presyncope should be considered.        MD Quang VALLES 12/19/2016 18:09:18  T 12/19/2016 18:28:24  R 12/19/2016 18:28:24  [id number]          Physical Examination Review of Systems   Vitals:    01/16/17 0820   BP: 126/84   Pulse: 98   Resp: 18     Body mass index is 34.96 kg/(m^2).  Wt Readings from Last 3 Encounters:   01/16/17 (!) 265 lb (120.2 kg)   01/10/17 (!) 265 lb (120.2 kg)   12/28/16 (!) 265 lb (120.2 kg)       General Appearance:   alert, no apparent distress   HEENT:  no scleral icterus; the mucous membranes are pink and moist                                  Neck:  "jugular venous pressure normal   Chest: the spine is straight and the chest is symmetric   Lungs:   respirations unlabored; the lungs are clear to auscultation   Cardiovascular:   regular rhythm with normal first and second heart sounds and no murmurs or gallops; carotid pulses are intact and there are no carotid bruits.   Abdomen:  no organomegaly, masses, bruits, or tenderness; bowel sounds are present   Extremities: no cyanosis, clubbing, or edema   Skin: no xanthelasma    General: WNL  Eyes: WNL  Ears/Nose/Throat: WNL  Lungs: Shortness of Breath  Heart: Chest Pain, Shortness of Breath with activity  Stomach: Heartburn  Bladder: WNL  Muscle/Joints: WNL  Skin: WNL  Nervous System: WNL  Mental Health: WNL     Blood: WNL     Medical History  Surgical History Family History Social History   Past Medical History   Diagnosis Date   ? Anxiety    ? Chronic back pain    ? Diabetes mellitus      \"pre-DM\"   ? Hyperlipidemia    ? Obesity    ? STEVE (obstructive sleep apnea)    ? Reflux esophagitis    ? Salmonella dysentery 2014    Past Surgical History   Procedure Laterality Date   ? Pr biopsy of skin lesion       Description: Biopsy Skin;  Recorded: 05/22/2009;   ? Pr appendectomy       Description: Appendectomy;  Recorded: 05/22/2009;   ? Pr lap,cholecystectomy       Description: Cholecystectomy Laparoscopic;  Recorded: 02/14/2011;   ? Cataract os  09/21/2014   ? Carotid endarterectomy  2013    Family History   Problem Relation Age of Onset   ? Heart disease Father    ? Heart disease Sister     Social History     Social History   ? Marital status:      Spouse name: N/A   ? Number of children: N/A   ? Years of education: N/A     Occupational History   ? 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     Social History Main Topics   ? Smoking status: Former Smoker     Types: Cigarettes   ? Smokeless tobacco: Not on file   ? Alcohol use No   ? Drug use: No   ? Sexual activity: Yes     Partners: Female "     Other Topics Concern   ? Not on file     Social History Narrative          Medications  Allergies   Current Outpatient Prescriptions   Medication Sig Dispense Refill   ? aluminum-magnesium hydroxide 200-200 mg/5 mL suspension Take 30 mL by mouth 4 (four) times a day as needed for indigestion.     ? atorvastatin (LIPITOR) 40 MG tablet TAKE 1 TABLET BY MOUTH DAILY 90 tablet 1   ? calcium carbonate-simethicone (GAS-X WITH MAALOX) 500-125 mg Chew Chew 1 tablet daily.     ? calcium, as carbonate, (TUMS) 200 mg calcium (500 mg) chewable tablet Chew 1 tablet daily. Patient takes 4500 mg daily.     ? cyclobenzaprine (FLEXERIL) 5 MG tablet Take 5 mg by mouth 2 (two) times a day as needed for muscle spasms.     ? fexofenadine (ALLEGRA) 180 MG tablet Take 180 mg by mouth daily as needed.     ? fluticasone (FLONASE) 50 mcg/actuation nasal spray INHALE 1 TO 2 SPRAYS INTO EACH NOSTRIL ONCE DAILY 48 g 1   ? lansoprazole (PREVACID) 30 MG capsule TAKE 1 CAPSULE BY MOUTH TWICE DAILY  7   ? lansoprazole (PREVACID) 30 MG capsule TAKE 1 CAPSULE BY MOUTH TWICE DAILY 60 capsule 10   ? LORazepam (ATIVAN) 1 MG tablet TAKE 1 TABLET (1 MG TOTAL) BY MOUTH AS NEEDED FOR ANXIETY. 90 tablet 2   ? losartan (COZAAR) 25 MG tablet Take 1 tablet (25 mg total) by mouth daily. 90 tablet 3   ? methylcellulose (CITRUCEL) 500 mg Tab Take 500 mg by mouth daily. Take 6 tablets by mouth daily.     ? metroNIDAZOLE (METROGEL) 1 % gel Apply 1 application topically daily. Apply sparingly to affected area(s) once daily.     ? ranitidine (ZANTAC) 150 MG tablet Take 1 tablet (150 mg total) by mouth 2 (two) times a day. 60 tablet 11     No current facility-administered medications for this visit.       Allergies   Allergen Reactions   ? Asa-Acetaminophen-Caff-Buffers Nausea And Vomiting   ? Aspirin    ? Niacin    ? Tetanus And Diphtheria Toxoids, Adsorbed, Adult Angiodema     Tongue swells up   ? Tetanus Toxoid, Adsorbed Swelling   ? Tetanus Vaccines And Toxoid           Lab Results    Chemistry/lipid CBC Cardiac Enzymes/BNP/TSH/INR   Lab Results   Component Value Date    CHOL 154 12/09/2016    HDL 34 (L) 12/09/2016    LDLCALC 87 12/09/2016    TRIG 166 (H) 12/09/2016    CREATININE 0.87 10/27/2016    BUN 19 10/27/2016    K 4.4 10/27/2016     10/27/2016     10/27/2016    CO2 24 10/27/2016    Lab Results   Component Value Date    WBC 8.5 11/12/2015    HGB 14.3 11/12/2015    HCT 42.1 11/12/2015    MCV 87 11/12/2015     11/12/2015    Lab Results   Component Value Date    CKTOTAL 82 07/25/2013    TSH 2.81 10/27/2016

## 2021-06-26 NOTE — PROGRESS NOTES
Progress Notes by Surekha Claire MD at 9/17/2018 10:10 AM     Author: Surekha Claire MD Service: -- Author Type: Physician    Filed: 9/25/2018  5:04 PM Encounter Date: 9/17/2018 Status: Signed    : Surekha Claire MD (Physician)           Click to link to [facility] HEART CARE NOTE    Thank you, Dr. Kraus, for asking us to see Nadir Cantu at the [facility].      Assessment/Recommendations   Assessment:    1.    Obstructive sleep apnea: Moderate sleep apnea not compliant with CPAP.  2.    Coronary artery disease: Nonobstructive coronary artery disease on CT coronary angiogram done a year ago.  No anginal complaints.  Continue on daily aspirin and statin therapy.  3.  Hypertension: Blood pressure is well controlled today.  4.  Morbid obesity: Has been able to lose weight with diet.  Encouraged him to continue with diet, increased exercise.  Follow-up in a year.       History of Present Illness    Mr. Nadir Cantu is a 68 y.o. male with history of peripheral arterial disease status post left carotid endarterectomy, morbid obesity, diabetes mellitus type 2, mild STEVE, hypertension and CVA being seen today in follow-up.   CT coronary angiogram with calcium scoring on 4/2017 showed no evidence of obstructive coronary artery disease.  Denies any chest pain.  He has been able to lose 20 pounds through diet.  He admits to not exercising much and does have some shortness of breath with exertion that is attributed to asthma.  He was found to have sleep apnea and does not wear a CPAP.   He also complains of joint discomfort and back problems that cause difficulty with exercise.     CT coronary angiogram 4/2017    The total Agatston calcium score is 69. A calcium score in this range places the individual in the 25-50th percentile when compared to an age and gender matched control group and implies a low risk of cardiac events in the next ten  "years.    Coronary atherosclerosis with no severe stenosis identified  Mildly dilated proximal pulmonary arteries       Physical Examination Review of Systems   Vitals:    09/17/18 1028   BP: 104/68   Pulse: 80   Resp: 16     Body mass index is 32.73 kg/(m^2).  Wt Readings from Last 3 Encounters:   09/17/18 (!) 243 lb (110.2 kg)   07/23/18 (!) 248 lb 2 oz (112.5 kg)   03/28/18 (!) 244 lb 9.6 oz (110.9 kg)       General Appearance:   alert, no apparent distress   HEENT:  no scleral icterus; the mucous membranes are pink and moist                                  Neck: jugular venous pressure normal   Chest: the spine is straight and the chest is symmetric   Lungs:   respirations unlabored; the lungs are clear to auscultation   Cardiovascular:   regular rhythm with normal first and second heart sounds and no murmurs or gallops   Abdomen:  no organomegaly, masses, bruits, or tenderness; bowel sounds are present   Extremities: no cyanosis, clubbing, or edema   Skin: no xanthelasma    General: WNL  Eyes: WNL  Ears/Nose/Throat: WNL  Lungs: Shortness of Breath  Heart: Shortness of Breath with activity  Stomach: Heartburn  Bladder: WNL  Muscle/Joints: Joint Pain  Skin: WNL  Nervous System: WNL  Mental Health: WNL     Blood: WNL     Medical History  Surgical History Family History Social History   Past Medical History:   Diagnosis Date   ? Anxiety    ? Asthma    ? Chronic back pain    ? Diabetes mellitus (H)     \"pre-DM\"   ? Hearing loss    ? Hyperlipidemia    ? Hypertension    ? Obesity    ? STEVE (obstructive sleep apnea)    ? Peripheral vascular disease (H)    ? Reflux esophagitis    ? Salmonella dysentery 2014    Past Surgical History:   Procedure Laterality Date   ? CAROTID ENDARTERECTOMY  2013   ? CATARACT OS  09/21/2014   ? [place] APPENDECTOMY      Description: Appendectomy;  Recorded: 05/22/2009;   ? [place] BIOPSY OF SKIN LESION      Description: Biopsy Skin;  Recorded: 05/22/2009;   ? [place] LAP,CHOLECYSTECTOMY      Description: " Cholecystectomy Laparoscopic;  Recorded: 02/14/2011;    Family History   Problem Relation Age of Onset   ? Heart disease Father    ? Heart disease Sister     Social History     Social History   ? Marital status:      Spouse name: N/A   ? Number of children: N/A   ? Years of education: N/A     Occupational History   ? 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 Smoking status: Former Smoker     Types: Cigarettes     Quit date: 4/25/1969   ? Smokeless tobacco: Never Used   ? Alcohol use No   ? Drug use: No   ? Sexual activity: Yes     Partners: Female     Other Topics Concern   ? Not on file     Social History Narrative          Medications  Allergies   Current Outpatient Prescriptions   Medication Sig Dispense Refill   ? albuterol (PROAIR HFA;PROVENTIL HFA;VENTOLIN HFA) 90 mcg/actuation inhaler Inhale 2 puffs every 6 (six) hours as needed for wheezing or shortness of breath. 1 Inhaler 12   ? aluminum hydrox-magnesium carb 160-105 mg Chew Chew.     ? aspirin (ASPIRIN LOW DOSE) 81 MG EC tablet Take by mouth 2 (two) times a day.      ? atorvastatin (LIPITOR) 40 MG tablet Take by mouth.     ? azelastine (ASTELIN) 137 mcg (0.1 %) nasal spray into each nostril.     ? calcium carbonate-simethicone (GAS-X WITH MAALOX) 500-125 mg Chew Chew 1 tablet daily.     ? calcium, as carbonate, (TUMS) 200 mg calcium (500 mg) chewable tablet Chew 1 tablet daily. Patient takes 4500 mg daily.     ? fluticasone (FLONASE) 50 mcg/actuation nasal spray INHALE 1 TO 2 SPRAYS INTO EACH NOSTRIL ONCE DAILY 48 g 2   ? LORazepam (ATIVAN) 1 MG tablet TAKE 1 TABLET (1 MG TOTAL) BY MOUTH AS NEEDED FOR ANXIETY. 60 tablet 2   ? losartan (COZAAR) 50 MG tablet Take 1.5 tablets daily 45 tablet 9   ? methylcellulose (CITRUCEL) 500 mg Tab Take 500 mg by mouth daily. Take 6 tablets by mouth daily.     ? metoclopramide (REGLAN) 10 MG tablet Take by mouth.     ? metroNIDAZOLE (METROGEL) 1 % gel Apply 1  application topically daily. Apply sparingly to affected area(s) once daily.     ? ranitidine (ZANTAC) 150 MG tablet Take 1 tablet (150 mg total) by mouth 2 (two) times a day. 60 tablet 9   ? triamcinolone (KENALOG) 0.1 % cream Apply to rash twice daily 60 g 1   ? aluminum-magnesium hydroxide 200-200 mg/5 mL suspension Take 30 mL by mouth 4 (four) times a day as needed for indigestion.     ? benzonatate (TESSALON PERLES) 100 MG capsule Take 1 capsule (100 mg total) by mouth every 6 (six) hours as needed for cough. 30 capsule 0   ? celecoxib (CELEBREX) 100 MG capsule Take 1 capsule (100 mg total) by mouth daily. 30 capsule 2   ? codeine-guaiFENesin (CHERATUSSIN AC)  mg/5 mL liquid Take 5 mL by mouth 3 (three) times a day as needed for cough. 118 mL 0   ? gabapentin (NEURONTIN) 300 MG capsule   0   ? lansoprazole (PREVACID) 30 MG capsule TAKE 1 CAPSULE BY MOUTH TWICE DAILY 180 capsule 2   ? lansoprazole (PREVACID) 30 MG capsule Take 2 capsules (60 mg total) by mouth daily. 30 capsule 11   ? simethicone (GAS-X) 80 MG chewable tablet Chew.       No current facility-administered medications for this visit.       Allergies   Allergen Reactions   ? Asa-Acetaminophen-Caff-Buffers Nausea And Vomiting   ? Aspirin    ? Niacin    ? Tetanus And Diphtheria Toxoids, Adsorbed, Adult Angioedema     Tongue swells up   ? Tetanus Toxoid, Adsorbed Swelling   ? Tetanus Vaccines And Toxoid          Lab Results    Chemistry/lipid CBC Cardiac Enzymes/BNP/TSH/INR   Lab Results   Component Value Date    CHOL 132 03/28/2018    HDL 34 (L) 03/28/2018    LDLCALC 72 03/28/2018    TRIG 131 03/28/2018    CREATININE 0.84 03/28/2018    BUN 17 03/28/2018    K 4.5 03/28/2018     (L) 03/28/2018     03/28/2018    CO2 21 (L) 03/28/2018    Lab Results   Component Value Date    WBC 7.8 04/06/2017    HGB 14.6 04/06/2017    HCT 43.5 04/06/2017    MCV 88 04/06/2017     04/06/2017    Lab Results   Component Value Date    CKTOTAL 82  07/25/2013    TSH 2.40 04/06/2017

## 2021-06-27 NOTE — PROGRESS NOTES
Progress Notes by Carol Jack CNP at 5/3/2019  2:50 PM     Author: Carol Jack CNP Service: -- Author Type: Nurse Practitioner    Filed: 5/3/2019  3:31 PM Encounter Date: 5/3/2019 Status: Signed    : Carol Jack CNP (Nurse Practitioner)                 Click to link to [facility]       [facility] HEART CARE NOTE      Assessment/Recommendations   1.  Coronary artery disease: Coronary angiogram on April 18, 2019 with drug-eluting stent to mid LAD, drug-eluting stent to distal LAD, and PTCA of first diagonal.  Dual antiplatelet therapy is being used with aspirin indefinitely and ticagrelor for 1 year.  Plan to change Brilinta to Plavix after 1 year and continue indefinitely.  We discussed the importance of antiplatelet therapy and talking with his cardiologist prior to stopping these medications for any reason.  Puncture site is soft with no hematoma.      Risk factor modification and lifestyle management topics were discussed including managing comorbidities, weight loss, heart healthy diet and exercise.  He is participating in cardiac rehab.    2.  Dyslipidemia: Nadir Cantu is on high intensity statin therapy with atorvastatin 80 mg daily.  His dose was increased following PCI.  LDL is 80.  We discussed a diet low in saturated fat, weight loss, and exercise along with medication for better control of cholesterol.     3.  Hypotension: Orthostatic blood pressure 94/60 sitting and 86/60 standing with lightheadedness.  He was started on metoprolol following PCI.  He was encouraged to stay hydrated which he struggles with.  Dr. Kraus recommended decreasing losartan earlier today.  Decrease losartan from 75 mg daily to 50 mg daily.  He will call the clinic if he continues to have lightheadedness or low blood pressures.    4.  Diabetes:  We discussed the importance of tightly controlled blood sugars to minimize risk of worsening coronary artery disease.  Primary  care provider to manage diabetes.    5.  Obstructive sleep apnea: He states that he had a sleep study a few years ago and was diagnosed with mild sleep apnea.  He has been unable to wear CPAP mask.  He is interested in referral to sleep medicine to reevaluate.  Referral sent today.    Nadir will follow up with Dr. Claire on June 17.     History of Present Illness    Nadir Cantu is seen at [facility] for post coronary intervention follow up.  He has a history of dyslipidemia, hypertension, diabetes, obstructive sleep apnea (untreated), and asthma.  He had increasing shortness of breath and chest pain.  Stress test was abnormal in April.  Coronary angiogram on April 18, 2019 with drug-eluting stent to mid LAD, drug-eluting stent to distal LAD, and PTCA of first diagonal.  Dual antiplatelet therapy is being used with aspirin indefinitely and ticagrelor for 1 year.  Plan to change Brilinta to Plavix after 1 year and continue indefinitely.      He has had lightheadedness and dizziness since PCI.  Orthostatic blood pressure today are 94/60 with sitting and 86/60 with standing.  He did get mildly lightheaded with standing.  He had a high blood pressure yesterday of 158/80.  Typically his blood pressures have been lower.  Primary care provider recommended decreasing losartan earlier today.    He no longer has chest pain.  He is able to walk up a flight of stairs without chest pain and has only minimal shortness of breath.  He continues to feel fatigued.  He denies lower extremity edema.      Results for orders placed during the hospital encounter of 04/18/19   Cardiac Catheterization [CATH01] 04/19/2019    Narrative Nadir Cantu is a 69 y.o. old male with HTN, HL, DM, obesity, STEVE who   is here for w/u of UA, abnormal stress test.    - two severe lesions in mLAD and dLAD s/p EESx2, PTA to small D1 branch   including w/ cutting balloon angioplasty  - moderate rPLV disease can be treated medically as  the first step  - ASA 81mg daily indefinitely, ticagrelor 180mg once, followed by 90mg   twice daily for at least 12 months, at which point P2Y12 inhibitor should   be changed to clopidogrel and continued indefinitely if tolerated  - add metoprolol  - increase atorva to 80, low threshold for consideration of PCS K9   inhibitors  - continue aggressive risk factor modification        Findings:  LM:no obstruction  LAD:85% mid-vessel narrowing at the take off of a small D1, which in turn   has a 95% Ca2+ proximal stenosis, long area of 80% narrowing in distal   vessel  Lcx:mildly irregular  RCA:focal 50% narrowing in the large rPLV    LVEDP:13    Access:  R Radial artery    PCI:  LMT was engaged w/ a 6F EBU 3.5 Guide catheter and the lesions in D1 and   LAD were wired w/  Forte wires, and LAD lesions ballooned w/ a 2.5x12mm   Emerge, and the D1 w/ a 2.0x6mm Kotlik cutting balloon. Distal LAD was   then stented w/ a 2.5x24mm Synergy EES at 11 zay, post-dilating prox- and   mid-stent w/ a 2.51q08pk NC Emerge inflations of 12-14 zay.   Proximal/mid-LAD lesion was stented w/ a 4.0x20mm Synergy EES at 11 zay.   As there was plaque shift into the D1, that was re-wired and treated w/ a   2.5x12mm NC Emerge at 12 zay inflations. Based on IVUS findings, proximal   portion of the proximal stent was treated w/ a 4.5x12mm NC Emerge at 7   zay, with more plaque shift into the D1, hence the latter was re-wired w/   a Forte and 'dottered' w/ the 2.5x12m NC Emerge w/ no inflations but   restoring a KEANU 3 flow. Final angiography showed no dissection or   perforation and a KEANU 3 flow.    Closure:   Vasc Band    This is a complex modifier 22 procedure due to long areas of Ca2+ disease,   bifurcation lesion, longer than usual procedural time        Physical Examination Review of Systems   Vitals:    05/03/19 1444   BP: 90/56   Pulse: 76   Resp: 20   SpO2: 97%     Body mass index is 31.8 kg/m .  Wt Readings from Last 3 Encounters:  "  05/03/19 (!) 241 lb (109.3 kg)   05/01/19 (!) 242 lb (109.8 kg)   04/29/19 (!) 243 lb (110.2 kg)       General Appearance:     Alert, cooperative and in no acute distress.   ENT/Mouth: membranes moist, no oral lesions or bleeding gums.      EYES:  no scleral icterus, normal conjunctivae   Chest/Lungs:   lungs are clear to auscultation, no rales or wheezing, respirations unlabored   Cardiovascular:   Regular. Normal first and second heart sounds, no edema bilateral lower extremities    Abdomen:  Soft, nontender, nondistended, bowel sounds present   Extremities: no cyanosis or clubbing   Skin:  Neurologic: warm, dry.  mood and affect are appropriate, alert and oriented x3     Puncture Site: Right radial site is soft with no bruising.  Radial pulses and Pedal pulses intact and symmetrical.  CMS intact.      General: Weight Loss  Eyes: WNL  Ears/Nose/Throat: Hearing Loss  Lungs: Shortness of Breath  Heart: Shortness of Breath with activity  Stomach: WNL  Bladder: WNL  Muscle/Joints: WNL  Skin: WNL  Nervous System: Dizziness  Mental Health: WNL     Blood: WNL     Medical History  Surgical History Family History Social History   Past Medical History:   Diagnosis Date   ? Anxiety    ? Asthma    ? 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    ? Chronic back pain    ? Coronary artery disease due to lipid rich plaque 4/19/2019   ? Diabetes mellitus (H)     \"pre-DM\"   ? Dyslipidemia, goal LDL below 70    ? Essential hypertension    ? Hearing loss    ? Left Rotator Cuff Tendonitis    ? Obesity    ? STEVE (obstructive sleep apnea)    ? Peripheral vascular disease (H)    ? Reflux esophagitis    ? Salmonella dysentery 2014   ? Vitamin D deficiency     Past Surgical History:   Procedure Laterality Date   ? APPENDECTOMY     ? CAROTID ENDARTERECTOMY  2013   ? CATARACT OS  09/21/2014   ? CV CORONARY ANGIOGRAM N/A 4/19/2019    Procedure: Coronary Angiogram;  Surgeon: Tonny Anna MD;  Location: [facility] Cath Lab;  Service: " Cardiology   ? CV LEFT HEART CATHETERIZATION WO LEFT VETRICULOGRAM Left 2019    Procedure: Left Heart Catheterization Without Left Ventriculogram;  Surgeon: Tonny Anna MD;  Location: [facility] Cath Lab;  Service: Cardiology   ? LAPAROSCOPIC CHOLECYSTECTOMY     ? [place] BIOPSY OF SKIN LESION      Description: Biopsy Skin;  Recorded: 2009;    Family History   Problem Relation Age of Onset   ? Heart disease Father    ? Heart disease Sister     Social History     Socioeconomic History   ? Marital status:      Spouse name: Allie   ? Number of children: Not on file   ? Years of education: Not on file   ? Highest education level: Not on file   Occupational History   ? 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     Comment: [employer]   Social Needs   ? Financial resource strain: Not on file   ? Food insecurity:     Worry: Not on file     Inability: Not on file   ? Transportation needs:     Medical: Not on file     Non-medical: Not on file   Tobacco Use   ? Smoking status: Former Smoker     Types: Cigarettes     Last attempt to quit: 1969     Years since quittin.0   ? Smokeless tobacco: Never Used   Substance and Sexual Activity   ? Alcohol use: No   ? Drug use: No   ? Sexual activity: Yes     Partners: Female   Lifestyle   ? Physical activity:     Days per week: Not on file     Minutes per session: Not on file   ? Stress: Not on file   Relationships   ? Social connections:     Talks on phone: Not on file     Gets together: Not on file     Attends Sikh service: Not on file     Active member of club or organization: Not on file     Attends meetings of clubs or organizations: Not on file     Relationship status: Not on file   ? Intimate partner violence:     Fear of current or ex partner: Not on file     Emotionally abused: Not on file     Physically abused: Not on file     Forced sexual activity: Not on file    Other Topics Concern   ? Not on file   Social History Narrative   ? Not on file          Medications  Allergies   Current Outpatient Medications   Medication Sig Dispense Refill   ? albuterol (PROAIR HFA;PROVENTIL HFA;VENTOLIN HFA) 90 mcg/actuation inhaler Inhale 2 puffs every 6 (six) hours as needed for wheezing or shortness of breath. 1 Inhaler 12   ? aluminum-magnesium hydroxide 200-200 mg/5 mL suspension Take 30 mL by mouth 4 (four) times a day as needed for indigestion.     ? aspirin (ASPIRIN LOW DOSE) 81 MG EC tablet Take 81 mg by mouth daily.            ? atorvastatin (LIPITOR) 80 MG tablet Take 1 tablet (80 mg total) by mouth daily. 30 tablet 0   ? calcium, as carbonate, (TUMS) 200 mg calcium (500 mg) chewable tablet Chew 1 tablet as needed for heartburn.            ? coenzyme Q10 (COQ-10) 100 mg capsule Take 1 capsule (100 mg total) by mouth daily.  0   ? escitalopram oxalate (LEXAPRO) 10 MG tablet Take 1 tablet (10 mg total) by mouth daily. 30 tablet 2   ? fluticasone (FLONASE) 50 mcg/actuation nasal spray INHALE 1 TO 2 SPRAYS INTO EACH NOSTRIL ONCE DAILY 48 g 2   ? fluticasone furoate (ARNUITY ELLIPTA) 200 mcg/actuation DsDv Inhale 1 puff daily. 30 each 12   ? lansoprazole (PREVACID) 30 MG capsule Take 30 mg by mouth daily.     ? lansoprazole (PREVACID) 30 MG capsule Take 30 mg by mouth daily as needed (heartburn). Takes in addition to QAM dose if needed     ? LORazepam (ATIVAN) 1 MG tablet TAKE 1 TABLET (1 MG TOTAL) BY MOUTH AS NEEDED FOR ANXIETY. 60 tablet 2   ? losartan (COZAAR) 50 MG tablet Take 1 tablet (50 mg total) by mouth daily.  0   ? magnesium chloride (SLOW-MAG) 64 mg TbEC delayed-release tablet Take 2 tablets (128 mg total) by mouth daily.  0   ? metoprolol succinate (TOPROL-XL) 25 MG Take 1 tablet (25 mg total) by mouth daily. 30 tablet 0   ? metroNIDAZOLE (METROGEL) 1 % gel Apply 1 application topically daily. Apply sparingly to affected area(s) once daily.     ? nitroglycerin (NITROSTAT)  0.3 MG SL tablet Place 1 tablet (0.3 mg total) under the tongue every 5 (five) minutes as needed. 1 Bottle 5   ? ranitidine (ZANTAC) 150 MG tablet Take 1 tablet (150 mg total) by mouth 2 (two) times a day as needed for heartburn.     ? simethicone (GAS-X) 80 MG chewable tablet Chew 80 mg every 6 (six) hours as needed for flatulence.            ? ticagrelor (BRILINTA) 90 mg Tab Take 1 tablet (90 mg total) by mouth 2 (two) times a day. 60 tablet 0     No current facility-administered medications for this visit.       Allergies   Allergen Reactions   ? Aspirin Other (See Comments)     Upset stomach - can tolerate the low dose ASA   ? Niacin Other (See Comments)     Upset stomach   ? Tetanus And Diphtheria Toxoids, Adsorbed, Adult Angioedema     Tongue swells up         Lab Results    Chemistry CBC BNP   Lab Results   Component Value Date    CREATININE 0.83 04/20/2019    BUN 12 04/20/2019     04/20/2019    K 4.1 04/20/2019     04/20/2019    CO2 24 04/20/2019     Creatinine (mg/dL)   Date Value   04/20/2019 0.83   04/18/2019 0.83   04/18/2019 0.86   11/14/2018 0.80    Lab Results   Component Value Date    WBC 10.2 04/18/2019    WBC 9.9 04/18/2019    HGB 12.4 (L) 04/20/2019    HCT 43.2 04/18/2019    HCT 43.3 04/18/2019    MCV 88 04/18/2019    MCV 89 04/18/2019     04/20/2019    No results found for: BNP  No results found for: BNP         Carol Jack CNP  [facility]

## 2021-06-27 NOTE — PROGRESS NOTES
Progress Notes by Surekha Claire MD at 4/18/2019  1:40 PM     Author: Surekha Claire MD Service: -- Author Type: Physician    Filed: 4/18/2019  2:54 PM Encounter Date: 4/18/2019 Status: Signed    : Surekha Claire MD (Physician)           Click to link to [facility] HEART CARE NOTE    Thank you, Dr. Kraus, for asking us to see Nadir Cantu at the [facility].      Assessment/Recommendations   Assessment:    1.  Chest pain: Chest pain sounds typical for angina although CTA done fairly recently in April 2017 did not show any significant obstructive disease.  Stress testing was intermediate risk with a medium size area of ischemia in the mid to distal anterior, anteroseptal and apical segments.  Discussed at length with him and his wife.  If recurrent chest pain recommend calling 911 and going to the hospital.  He will be scheduled for coronary angiography with possible intervention tomorrow.    2.  Hypertension  3.  Morbid obesity  4.  Asthma  5.  STEVE not compliant with CPAP    Plan:  1.  N.p.o. past midnight tonight and plan for coronary geography possible intervention tomorrow.  He understands the risks and benefits of the procedure and agrees to proceed.  This will be done at [facility].  2.  Blood pressure is well controlled today.  Continue losartan 50 mg daily  3.  Continue statin therapy and aspirin  4.  May follow-up with me after the procedure       History of Present Illness    Mr. Nadir Cantu is a 69 y.o. male with history of anxiety, asthma, nonobstructive coronary artery disease on CTA done in April 2017, STEVE not compliant with CPAP, hypertension, and morbid obesity who I am seeing today in follow-up after abnormal stress test.  He recently saw his primary who ordered stress testing due to chest pain that sounded somewhat atypical at that time.  It was felt that it may be due to anxiety.  Stress testing came  back as abnormal.  There was a medium size area of ischemia involving the mid to distal anteroseptal, anterior and apical walls which appeared new compared to the prior study done in February 2016.  Patient reports that he has had long-standing dyspnea on exertion and asthma medication is helped with his breathing difficulty.  Over the past 6 months he has noted problems with chest tightness and pressure.  He notices that this is exertional and when he rests the pain will go away.  He has occasionally taken nitroglycerin for the pain which alleviates his symptoms.  Chest pain is mid to left-sided.    Twelve-lead EKG today demonstrates normal sinus rhythm at 94 bpm   with nonspecific ST-T abnormalities no change compared to prior    Exercise nuclear stress test 4/15/2019    The exercise nuclear stress test is abnormal.    Stress nuclear images demonstrate a medium area of ischemia involving the mid to distal anteroseptal, anterior and apical walls.    The left ventricular ejection fraction is 68% without wall motion abnormality.    When compared to the images of 2/18/2016, the anterior, anteroseptal and apical ischemia is new.    The patient is at an intermediate risk of future cardiac ischemic events.    The findings of this examination were communicated to Dr. Kraus.    Echocardiogram 11/23/2018    Left ventricle ejection fraction is normal. The calculated left ventricular ejection fraction is 65%.    Normal left ventricular size and systolic function.    Normal right ventricular size and systolic function.    No hemodynamically significant valvular heart abnormalities.    When compared to the previous study dated 1/10/2017, no significant change.     CT coronary angiogram with calcium scoring 4/25/2017    The total Agatston calcium score is 69. A calcium score in this range places the individual in the 25-50th percentile when compared to an age and gender matched control group and implies a low risk of cardiac  "events in the next ten years.    Coronary atherosclerosis with no severe stenosis identified    Mildly dilated proximal pulmonary arteries            Physical Examination Review of Systems   Vitals:    04/18/19 1353   BP: 125/76   Pulse: 80   SpO2: 95%     Body mass index is 33.4 kg/m .  Wt Readings from Last 3 Encounters:   04/18/19 (!) 248 lb (112.5 kg)   04/03/19 (!) 248 lb (112.5 kg)   02/06/19 (!) 255 lb (115.7 kg)       General Appearance:   alert, no apparent distress   HEENT:  no scleral icterus; the mucous membranes are pink and moist                                  Neck: jugular venous pressure normal   Chest: the spine is straight and the chest is symmetric   Lungs:   respirations unlabored; the lungs are clear to auscultation   Cardiovascular:   regular rhythm with normal first and second heart sounds and no murmurs or gallops   Abdomen:  no organomegaly, masses, bruits, or tenderness; bowel sounds are present   Extremities: no edema   Skin: no xanthelasma                                              Medical History  Surgical History Family History Social History   Past Medical History:   Diagnosis Date   ? Anxiety    ? Asthma    ? Chronic back pain    ? Diabetes mellitus (H)     \"pre-DM\"   ? Hearing loss    ? Hyperlipidemia    ? Hypertension    ? Obesity    ? STEVE (obstructive sleep apnea)    ? Peripheral vascular disease (H)    ? Reflux esophagitis    ? Salmonella dysentery 2014   ? Vitamin D deficiency     Past Surgical History:   Procedure Laterality Date   ? CAROTID ENDARTERECTOMY  2013   ? CATARACT OS  09/21/2014   ? PA APPENDECTOMY      Description: Appendectomy;  Recorded: 05/22/2009;   ? PA BIOPSY OF SKIN LESION      Description: Biopsy Skin;  Recorded: 05/22/2009;   ? PA LAP,CHOLECYSTECTOMY      Description: Cholecystectomy Laparoscopic;  Recorded: 02/14/2011;    Family History   Problem Relation Age of Onset   ? Heart disease Father    ? Heart disease Sister     Social History "     Socioeconomic History   ? Marital status:      Spouse name: Not on file   ? Number of children: Not on file   ? Years of education: Not on file   ? Highest education level: Not on file   Occupational History   ? Occupation: 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Employer: [employer]     Comment: [employer]   Social Needs   ? Financial resource strain: Not on file   ? Food insecurity:     Worry: Not on file     Inability: Not on file   ? Transportation needs:     Medical: Not on file     Non-medical: Not on file   Tobacco Use   ? Smoking status: Former Smoker     Types: Cigarettes     Last attempt to quit: 1969     Years since quittin.0   ? Smokeless tobacco: Never Used   Substance and Sexual Activity   ? Alcohol use: No   ? Drug use: No   ? Sexual activity: Yes     Partners: Female   Lifestyle   ? Physical activity:     Days per week: Not on file     Minutes per session: Not on file   ? Stress: Not on file   Relationships   ? Social connections:     Talks on phone: Not on file     Gets together: Not on file     Attends Nondenominational service: Not on file     Active member of club or organization: Not on file     Attends meetings of clubs or organizations: Not on file     Relationship status: Not on file   ? Intimate partner violence:     Fear of current or ex partner: Not on file     Emotionally abused: Not on file     Physically abused: Not on file     Forced sexual activity: Not on file   Other Topics Concern   ? Not on file   Social History Narrative   ? Not on file          Medications  Allergies   Current Outpatient Medications   Medication Sig Dispense Refill   ? albuterol (PROAIR HFA;PROVENTIL HFA;VENTOLIN HFA) 90 mcg/actuation inhaler Inhale 2 puffs every 6 (six) hours as needed for wheezing or shortness of breath. 1 Inhaler 12   ? aluminum hydrox-magnesium carb 160-105 mg Chew Chew.     ? aluminum-magnesium hydroxide 200-200 mg/5 mL suspension Take 30 mL by mouth 4 (four) times a day as  needed for indigestion.     ? aspirin (ASPIRIN LOW DOSE) 81 MG EC tablet Take by mouth 2 (two) times a day.      ? atorvastatin (LIPITOR) 40 MG tablet Take 1 tablet (40 mg total) by mouth daily. 90 tablet 3   ? calcium carbonate-simethicone (GAS-X WITH MAALOX) 500-125 mg Chew Chew 1 tablet daily.     ? calcium, as carbonate, (TUMS) 200 mg calcium (500 mg) chewable tablet Chew 1 tablet daily. Patient takes 4500 mg daily.     ? escitalopram oxalate (LEXAPRO) 10 MG tablet Take 1 tablet (10 mg total) by mouth daily. 30 tablet 2   ? fluticasone (FLONASE) 50 mcg/actuation nasal spray INHALE 1 TO 2 SPRAYS INTO EACH NOSTRIL ONCE DAILY 48 g 2   ? fluticasone furoate (ARNUITY ELLIPTA) 200 mcg/actuation DsDv Inhale 1 puff daily. 30 each 12   ? lansoprazole (PREVACID) 30 MG capsule Take 2 capsules (60 mg total) by mouth daily. 30 capsule 11   ? LORazepam (ATIVAN) 1 MG tablet TAKE 1 TABLET (1 MG TOTAL) BY MOUTH AS NEEDED FOR ANXIETY. 60 tablet 2   ? losartan (COZAAR) 50 MG tablet Take 1.5 tablets daily 45 tablet 9   ? methylcellulose (CITRUCEL) 500 mg Tab Take 500 mg by mouth daily. Take 6 tablets by mouth daily.     ? metoclopramide (REGLAN) 10 MG tablet Take by mouth.     ? metroNIDAZOLE (METROGEL) 1 % gel Apply 1 application topically daily. Apply sparingly to affected area(s) once daily.     ? nitroglycerin (NITROSTAT) 0.3 MG SL tablet Place 1 tablet (0.3 mg total) under the tongue every 5 (five) minutes as needed. 1 Bottle 5   ? ranitidine (ZANTAC) 150 MG tablet Take 1 tablet (150 mg total) by mouth 2 (two) times a day. 60 tablet 9   ? simethicone (GAS-X) 80 MG chewable tablet Chew.       No current facility-administered medications for this visit.       Allergies   Allergen Reactions   ? Asa-Acetaminophen-Caff-Buffers Nausea And Vomiting   ? Aspirin    ? Niacin    ? Tetanus And Diphtheria Toxoids, Adsorbed, Adult Angioedema     Tongue swells up   ? Tetanus Toxoid, Adsorbed Swelling   ? Tetanus Vaccines And Toxoid           Lab Results    Chemistry/lipid CBC Cardiac Enzymes/BNP/TSH/INR   Lab Results   Component Value Date    CHOL 132 03/28/2018    HDL 34 (L) 03/28/2018    LDLCALC 72 03/28/2018    TRIG 131 03/28/2018    CREATININE 0.80 11/14/2018    BUN 18 11/14/2018    K 4.9 11/14/2018     11/14/2018     11/14/2018    CO2 24 11/14/2018    Lab Results   Component Value Date    WBC 8.1 11/14/2018    HGB 13.6 (L) 12/18/2018    HCT 40.9 11/14/2018    MCV 87 11/14/2018     11/14/2018    Lab Results   Component Value Date    CKTOTAL 82 07/25/2013    TSH 2.81 11/14/2018

## 2021-06-27 NOTE — PROGRESS NOTES
Progress Notes by Surekha Claire MD at 6/17/2019 11:10 AM     Author: Surekha Claire MD Service: -- Author Type: Physician    Filed: 6/17/2019  1:46 PM Encounter Date: 6/17/2019 Status: Signed    : Surekha Claire MD (Physician)           Click to link to [facility] HEART CARE NOTE    Thank you, Dr. Kraus, for asking us to see Nadir Cantu at the [facility].      Assessment/Recommendations   Assessment:    1.  Coronary artery disease: Drug-eluting stent to mid and distal left anterior descending artery on 4/5/2019  2.  Morbid obesity: Has been able to lose about 35 pounds in the past couple years  3.  Hypertension: Well-controlled  4.  Dyslipidemia      Plan:  1.  Continue cardiac rehab  2.  Dual antiplatelet therapy for 1 year.  Continue on Brilinta 90 mg twice daily and aspirin 81 mg daily  3.  Continue on low-dose metoprolol and losartan.  4.  Continue on Lipitor 80 mg daily and needs repeat fasting lipids that he is going to have done by his primary  Follow-up in 3 months       History of Present Illness    Mr. Nadir Cantu is a 69 y.o. male with history of STEVE noncompliant with CPAP therapy, hypertension, morbid obesity, coronary artery disease status post recent stent placements I am seeing today in follow-up.  I saw him for a follow-up visit about a month ago and he describes exertional chest pain.  He underwent GINA to mid and distal left anterior descending on 4/19/2019.  He reports doing well since that time.  He denies any further chest pain and exercise tolerance has improved.  He denies any breathing difficulty either.  He still has some fatigue and admits to noncompliance with CPAP therapy.  He underwent left CEA 5 years ago.  Ultrasound of his carotids 2 years ago showed mild coronary artery disease.  He had been on 75 mg of losartan and this has been decreased due to dizziness and hypotension currently on 25 mg of  losartan.    Coronary Angiogram 4/19/19  Nadir Cantu is a 69 y.o. old male with HTN, HL, DM, obesity, STEVE who is here for w/u of UA, abnormal stress test.  - two severe lesions in mLAD and dLAD s/p EESx2, PTA to small D1 branch including w/ cutting balloon angioplasty  - moderate rPLV disease can be treated medically as the first step  - ASA 81mg daily indefinitely, ticagrelor 180mg once, followed by 90mg twice daily for at least 12 months, at which point P2Y12 inhibitor should be changed to clopidogrel and continued indefinitely if tolerated  - add metoprolol  - increase atorva to 80, low threshold for consideration of PCS K9 inhibitors  - continue aggressive risk factor modification  Findings:  LM:no obstruction  LAD:85% mid-vessel narrowing at the take off of a small D1, which in turn has a 95% Ca2+ proximal stenosis, long area of 80% narrowing in distal vessel  Lcx:mildly irregular  RCA:focal 50% narrowing in the large rPLV          Physical Examination Review of Systems   Vitals:    06/17/19 1109   BP: 98/62   Pulse: 78   Resp: 18     Body mass index is 30.87 kg/m .  Wt Readings from Last 3 Encounters:   06/17/19 (!) 234 lb (106.1 kg)   06/12/19 (!) 233 lb (105.7 kg)   06/05/19 (!) 236 lb (107 kg)       General Appearance:   alert, no apparent distress   HEENT:  no scleral icterus; the mucous membranes are pink and moist                                  Neck: No jvd   Chest: the spine is straight and the chest is symmetric   Lungs:   respirations unlabored; the lungs are clear to auscultation   Cardiovascular:   regular rhythm with normal first and second heart sounds and no murmurs or gallops   Abdomen:  no organomegaly, masses, bruits, or tenderness; bowel sounds are present   Extremities: no cyanosis, clubbing, or edema   Skin: no xanthelasma    General: WNL  Eyes: WNL  Ears/Nose/Throat: WNL  Lungs: WNL  Heart: WNL  Stomach: WNL  Bladder: WNL  Muscle/Joints: WNL  Skin: WNL  Nervous System: WNL  Mental  "Health: WNL     Blood: WNL     Medical History  Surgical History Family History Social History   Past Medical History:   Diagnosis Date   ? Anxiety    ? Asthma    ? 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    ? Chronic back pain    ? Coronary artery disease due to lipid rich plaque 4/19/2019   ? Diabetes mellitus (H)     \"pre-DM\"   ? Dyslipidemia, goal LDL below 70    ? Essential hypertension    ? Hearing loss    ? Left Rotator Cuff Tendonitis    ? Obesity    ? STEVE (obstructive sleep apnea)    ? Peripheral vascular disease (H)    ? Reflux esophagitis    ? Salmonella dysentery 2014   ? Vitamin D deficiency     Past Surgical History:   Procedure Laterality Date   ? APPENDECTOMY     ? CAROTID ENDARTERECTOMY  2013   ? CATARACT OS  09/21/2014   ? CV CORONARY ANGIOGRAM N/A 4/19/2019    Procedure: Coronary Angiogram;  Surgeon: Tonny Anna MD;  Location: [facility] Cath Lab;  Service: Cardiology   ? CV LEFT HEART CATHETERIZATION WO LEFT VETRICULOGRAM Left 4/19/2019    Procedure: Left Heart Catheterization Without Left Ventriculogram;  Surgeon: Tonny Anna MD;  Location: [facility] Cath Lab;  Service: Cardiology   ? LAPAROSCOPIC CHOLECYSTECTOMY     ? DE BIOPSY OF SKIN LESION      Description: Biopsy Skin;  Recorded: 05/22/2009;    Family History   Problem Relation Age of Onset   ? Heart disease Father    ? Heart disease Sister     Social History     Socioeconomic History   ? Marital status:      Spouse name: Allie   ? Number of children: Not on file   ? Years of education: Not on file   ? Highest education level: Not on file   Occupational History   ? 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 Financial resource strain: Not on file   ? Food insecurity:     Worry: Not on file     Inability: Not on file   ? Transportation needs:     Medical: Not on file     Non-medical: Not on file "   Tobacco Use   ? Smoking status: Former Smoker     Types: Cigarettes     Last attempt to quit: 1969     Years since quittin.1   ? Smokeless tobacco: Never Used   Substance and Sexual Activity   ? Alcohol use: No   ? Drug use: No   ? Sexual activity: Yes     Partners: Female   Lifestyle   ? Physical activity:     Days per week: Not on file     Minutes per session: Not on file   ? Stress: Not on file   Relationships   ? Social connections:     Talks on phone: Not on file     Gets together: Not on file     Attends Alevism service: Not on file     Active member of club or organization: Not on file     Attends meetings of clubs or organizations: Not on file     Relationship status: Not on file   ? Intimate partner violence:     Fear of current or ex partner: Not on file     Emotionally abused: Not on file     Physically abused: Not on file     Forced sexual activity: Not on file   Other Topics Concern   ? Not on file   Social History Narrative   ? Not on file          Medications  Allergies   Current Outpatient Medications   Medication Sig Dispense Refill   ? albuterol (PROAIR HFA;PROVENTIL HFA;VENTOLIN HFA) 90 mcg/actuation inhaler Inhale 2 puffs every 6 (six) hours as needed for wheezing or shortness of breath. 1 Inhaler 12   ? aluminum-magnesium hydroxide 200-200 mg/5 mL suspension Take 30 mL by mouth 4 (four) times a day as needed for indigestion.     ? aspirin (ASPIRIN LOW DOSE) 81 MG EC tablet Take 81 mg by mouth daily.            ? atorvastatin (LIPITOR) 80 MG tablet Take 1 tablet (80 mg total) by mouth daily. 30 tablet 0   ? calcium, as carbonate, (TUMS) 200 mg calcium (500 mg) chewable tablet Chew 1 tablet as needed for heartburn.            ? coenzyme Q10 (COQ-10) 100 mg capsule Take 1 capsule (100 mg total) by mouth daily.  0   ? escitalopram oxalate (LEXAPRO) 10 MG tablet Take 1 tablet (10 mg total) by mouth daily. 30 tablet 2   ? fluticasone furoate (ARNUITY ELLIPTA) 200 mcg/actuation DsDv  Inhale 1 puff daily. 30 each 12   ? fluticasone propionate (FLONASE) 50 mcg/actuation nasal spray INHALE 1 TO 2 SPRAYS INTO EACH NOSTRIL ONCE DAILY 48 g 2   ? lansoprazole (PREVACID) 30 MG capsule Take 30 mg by mouth daily.     ? lansoprazole (PREVACID) 30 MG capsule Take 30 mg by mouth daily as needed (heartburn). Takes in addition to QAM dose if needed     ? LORazepam (ATIVAN) 1 MG tablet TAKE 1 TABLET (1 MG TOTAL) BY MOUTH AS NEEDED FOR ANXIETY. 60 tablet 2   ? losartan (COZAAR) 25 MG tablet Take 1 tablet (25 mg total) by mouth daily.     ? magnesium chloride (SLOW-MAG) 64 mg TbEC delayed-release tablet Take 2 tablets (128 mg total) by mouth daily.  0   ? metoprolol succinate (TOPROL-XL) 25 MG Take 1 tablet (25 mg total) by mouth daily. 90 tablet 2   ? metroNIDAZOLE (METROGEL) 1 % gel Apply 1 application topically daily. Apply sparingly to affected area(s) once daily.     ? nitroglycerin (NITROSTAT) 0.3 MG SL tablet Place 1 tablet (0.3 mg total) under the tongue every 5 (five) minutes as needed. 1 Bottle 5   ? ranitidine (ZANTAC) 150 MG tablet Take 1 tablet (150 mg total) by mouth 2 (two) times a day as needed for heartburn.     ? simethicone (GAS-X) 80 MG chewable tablet Chew 80 mg every 6 (six) hours as needed for flatulence.            ? ticagrelor (BRILINTA) 90 mg Tab Take 1 tablet (90 mg total) by mouth 2 (two) times a day. 180 tablet 2     No current facility-administered medications for this visit.       Allergies   Allergen Reactions   ? Aspirin Other (See Comments)     Upset stomach - can tolerate the low dose ASA   ? Niacin Other (See Comments)     Upset stomach   ? Tetanus And Diphtheria Toxoids, Adsorbed, Adult Angioedema     Tongue swells up         Lab Results    Chemistry/lipid CBC Cardiac Enzymes/BNP/TSH/INR   Lab Results   Component Value Date    CHOL 148 04/18/2019    HDL 34 (L) 04/18/2019    LDLCALC 80 04/18/2019    TRIG 169 (H) 04/18/2019    CREATININE 0.83 04/20/2019    BUN 12 04/20/2019    K  4.1 04/20/2019     04/20/2019     04/20/2019    CO2 24 04/20/2019    Lab Results   Component Value Date    WBC 10.2 04/18/2019    WBC 9.9 04/18/2019    HGB 12.4 (L) 04/20/2019    HCT 43.2 04/18/2019    HCT 43.3 04/18/2019    MCV 88 04/18/2019    MCV 89 04/18/2019     04/20/2019    Lab Results   Component Value Date    CKTOTAL 82 07/25/2013    TROPONINI <0.01 04/19/2019    TSH 2.81 11/14/2018    INR 1.07 04/19/2019          Surekha Claire MD  [facility]
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
## 2021-06-28 NOTE — PROGRESS NOTES
Progress Notes by Lori Guerra CNP at 10/23/2019 12:30 PM     Author: Lori Guerra CNP Service: -- Author Type: Nurse Practitioner    Filed: 10/23/2019  2:54 PM Encounter Date: 10/23/2019 Status: Signed    : Lori Guerra CNP (Nurse Practitioner)        Zestar Study    Physical Examination  For abnormal findings, please evaluate if the finding is Clinically Significant (by 'CS') or Not Clinically Significant (by 'NCS')  General Appearance Normal  Head and Neck  Normal  Lungs    Normal  Cardiovascular  Normal  Abdomen   Normal  Musculoskeletal/Extremities Normal   Lymph Nodes  Normal  Skin    Normal  Neurological   Normal   Tremor present NCS +3/+3 bilateral hands    If present, evaluate severity on 1-10 scale    Lori Guerra CNP
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
## 2021-07-03 ENCOUNTER — HEALTH MAINTENANCE LETTER (OUTPATIENT)
Age: 71
End: 2021-07-03

## 2021-07-03 NOTE — ADDENDUM NOTE
Addendum Note by Benton Linda MD at 11/25/2020 10:35 AM     Author: Benton Linda MD Service: -- Author Type: Physician    Filed: 11/25/2020 10:35 AM Encounter Date: 11/24/2020 Status: Signed    : Benton Linda MD (Physician)    Addended by: BENTON LINDA on: 11/25/2020 10:35 AM        Modules accepted: Orders

## 2021-07-03 NOTE — ADDENDUM NOTE
Addendum Note by Benton Linda MD at 10/28/2020 11:18 AM     Author: Benton Linda MD Service: -- Author Type: Physician    Filed: 10/28/2020 11:18 AM Encounter Date: 10/27/2020 Status: Signed    : Benton Linda MD (Physician)    Addended by: BENTON LINDA on: 10/28/2020 11:18 AM        Modules accepted: Orders

## 2021-07-04 NOTE — ADDENDUM NOTE
Addendum Note by Benton Linda MD at 3/18/2021 12:33 PM     Author: Benton Linda MD Service: -- Author Type: Physician    Filed: 3/18/2021 12:33 PM Encounter Date: 3/18/2021 Status: Signed    : Benton Linda MD (Physician)    Addended by: BENTON LINDA on: 3/18/2021 12:33 PM        Modules accepted: Orders

## 2021-07-12 ENCOUNTER — OFFICE VISIT (OUTPATIENT)
Dept: PULMONOLOGY | Facility: OTHER | Age: 71
End: 2021-07-12
Payer: COMMERCIAL

## 2021-07-12 VITALS
OXYGEN SATURATION: 95 % | HEART RATE: 100 BPM | DIASTOLIC BLOOD PRESSURE: 74 MMHG | WEIGHT: 228.5 LBS | BODY MASS INDEX: 30.15 KG/M2 | SYSTOLIC BLOOD PRESSURE: 104 MMHG

## 2021-07-12 DIAGNOSIS — J30.9 ALLERGIC RHINITIS, UNSPECIFIED SEASONALITY, UNSPECIFIED TRIGGER: ICD-10-CM

## 2021-07-12 DIAGNOSIS — J45.30 MILD PERSISTENT ASTHMA WITHOUT COMPLICATION: Primary | ICD-10-CM

## 2021-07-12 DIAGNOSIS — K21.9 GASTROESOPHAGEAL REFLUX DISEASE WITHOUT ESOPHAGITIS: ICD-10-CM

## 2021-07-12 PROCEDURE — 99214 OFFICE O/P EST MOD 30 MIN: CPT | Performed by: INTERNAL MEDICINE

## 2021-07-12 RX ORDER — ALBUTEROL SULFATE 90 UG/1
2 AEROSOL, METERED RESPIRATORY (INHALATION) EVERY 6 HOURS
Qty: 18 G | Refills: 12 | Status: SHIPPED | OUTPATIENT
Start: 2021-07-12 | End: 2021-12-07

## 2021-07-12 ASSESSMENT — ASTHMA QUESTIONNAIRES
QUESTION_1 LAST FOUR WEEKS HOW MUCH OF THE TIME DID YOUR ASTHMA KEEP YOU FROM GETTING AS MUCH DONE AT WORK, SCHOOL OR AT HOME: SOME OF THE TIME
QUESTION_3 LAST FOUR WEEKS HOW OFTEN DID YOUR ASTHMA SYMPTOMS (WHEEZING, COUGHING, SHORTNESS OF BREATH, CHEST TIGHTNESS OR PAIN) WAKE YOU UP AT NIGHT OR EARLIER THAN USUAL IN THE MORNING: NOT AT ALL
QUESTION_5 LAST FOUR WEEKS HOW WOULD YOU RATE YOUR ASTHMA CONTROL: POORLY CONTROLLED
QUESTION_2 LAST FOUR WEEKS HOW OFTEN HAVE YOU HAD SHORTNESS OF BREATH: MORE THAN ONCE A DAY
QUESTION_4 LAST FOUR WEEKS HOW OFTEN HAVE YOU USED YOUR RESCUE INHALER OR NEBULIZER MEDICATION (SUCH AS ALBUTEROL): ONCE A WEEK OR LESS
ACT_TOTALSCORE: 15

## 2021-07-12 NOTE — PATIENT INSTRUCTIONS
1. BREO 200/25 one puff daily , rinse your mouth after each use  2. Albuterol HFA two puffs before activities and every 4 hours as needed  3. Continue flonase two sprays each nostril daily  4. Continue astelin two sprays each nostril daily  5. Prilosec 20 mg daily   6. Continue CPAP therapy   7. Increase activity as tolerated  8. Contact me in 3 to 4 weeks to let me know how are you doing..   9. Follow up in 6 months

## 2021-07-13 ASSESSMENT — ASTHMA QUESTIONNAIRES: ACT_TOTALSCORE: 15

## 2021-07-14 PROBLEM — I20.9 ANGINA PECTORIS (H): Status: RESOLVED | Noted: 2019-04-18 | Resolved: 2019-05-03
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
## 2021-08-12 ENCOUNTER — MYC REFILL (OUTPATIENT)
Dept: CARDIOLOGY | Facility: CLINIC | Age: 71
End: 2021-08-12

## 2021-08-12 DIAGNOSIS — I25.10 CORONARY ARTERY DISEASE INVOLVING NATIVE CORONARY ARTERY OF NATIVE HEART WITHOUT ANGINA PECTORIS: ICD-10-CM

## 2021-08-12 RX ORDER — CLOPIDOGREL BISULFATE 75 MG/1
75 TABLET ORAL DAILY
Qty: 90 TABLET | Refills: 1 | Status: SHIPPED | OUTPATIENT
Start: 2021-08-12 | End: 2022-06-14

## 2021-08-28 ENCOUNTER — HEALTH MAINTENANCE LETTER (OUTPATIENT)
Age: 71
End: 2021-08-28

## 2021-09-16 ENCOUNTER — OFFICE VISIT (OUTPATIENT)
Dept: INTERNAL MEDICINE | Facility: CLINIC | Age: 71
End: 2021-09-16
Payer: COMMERCIAL

## 2021-09-16 VITALS
SYSTOLIC BLOOD PRESSURE: 101 MMHG | WEIGHT: 230 LBS | HEART RATE: 64 BPM | OXYGEN SATURATION: 99 % | BODY MASS INDEX: 30.34 KG/M2 | DIASTOLIC BLOOD PRESSURE: 80 MMHG

## 2021-09-16 DIAGNOSIS — I25.10 CORONARY ARTERY DISEASE DUE TO LIPID RICH PLAQUE: ICD-10-CM

## 2021-09-16 DIAGNOSIS — Z13.220 SCREENING FOR HYPERLIPIDEMIA: ICD-10-CM

## 2021-09-16 DIAGNOSIS — E11.9 TYPE 2 DIABETES, HBA1C GOAL < 8% (H): ICD-10-CM

## 2021-09-16 DIAGNOSIS — G47.33 OBSTRUCTIVE SLEEP APNEA SYNDROME: Primary | Chronic | ICD-10-CM

## 2021-09-16 DIAGNOSIS — R39.11 BENIGN PROSTATIC HYPERPLASIA WITH URINARY HESITANCY: ICD-10-CM

## 2021-09-16 DIAGNOSIS — I10 ESSENTIAL HYPERTENSION: ICD-10-CM

## 2021-09-16 DIAGNOSIS — E66.01 MORBID (SEVERE) OBESITY DUE TO EXCESS CALORIES (H): Chronic | ICD-10-CM

## 2021-09-16 DIAGNOSIS — N40.1 BENIGN PROSTATIC HYPERPLASIA WITH URINARY HESITANCY: ICD-10-CM

## 2021-09-16 DIAGNOSIS — I25.83 CORONARY ARTERY DISEASE DUE TO LIPID RICH PLAQUE: ICD-10-CM

## 2021-09-16 LAB
ALBUMIN SERPL-MCNC: 4.2 G/DL (ref 3.5–5)
ALP SERPL-CCNC: 49 U/L (ref 45–120)
ALT SERPL W P-5'-P-CCNC: 19 U/L (ref 0–45)
ANION GAP SERPL CALCULATED.3IONS-SCNC: 16 MMOL/L (ref 5–18)
AST SERPL W P-5'-P-CCNC: 13 U/L (ref 0–40)
BACTERIA #/AREA URNS HPF: ABNORMAL /HPF
BILIRUB SERPL-MCNC: 0.6 MG/DL (ref 0–1)
BUN SERPL-MCNC: 23 MG/DL (ref 8–28)
CALCIUM SERPL-MCNC: 10.6 MG/DL (ref 8.5–10.5)
CHLORIDE BLD-SCNC: 102 MMOL/L (ref 98–107)
CO2 SERPL-SCNC: 22 MMOL/L (ref 22–31)
CREAT SERPL-MCNC: 1.06 MG/DL (ref 0.7–1.3)
CREAT UR-MCNC: 76 MG/DL
GFR SERPL CREATININE-BSD FRML MDRD: 70 ML/MIN/1.73M2
GLUCOSE BLD-MCNC: 173 MG/DL (ref 70–125)
HBA1C MFR BLD: 7.8 % (ref 0–5.6)
MICROALBUMIN UR-MCNC: 1.09 MG/DL (ref 0–1.99)
MICROALBUMIN/CREAT UR: 14.3 MG/G CR
POTASSIUM BLD-SCNC: 3.6 MMOL/L (ref 3.5–5)
PROT SERPL-MCNC: 7.2 G/DL (ref 6–8)
RBC #/AREA URNS AUTO: ABNORMAL /HPF
SODIUM SERPL-SCNC: 140 MMOL/L (ref 136–145)
SQUAMOUS #/AREA URNS AUTO: ABNORMAL /LPF
WBC #/AREA URNS AUTO: ABNORMAL /HPF

## 2021-09-16 PROCEDURE — 99213 OFFICE O/P EST LOW 20 MIN: CPT | Mod: 25 | Performed by: INTERNAL MEDICINE

## 2021-09-16 PROCEDURE — G0008 ADMIN INFLUENZA VIRUS VAC: HCPCS | Performed by: INTERNAL MEDICINE

## 2021-09-16 PROCEDURE — 99397 PER PM REEVAL EST PAT 65+ YR: CPT | Mod: 25 | Performed by: INTERNAL MEDICINE

## 2021-09-16 PROCEDURE — 81015 MICROSCOPIC EXAM OF URINE: CPT | Performed by: INTERNAL MEDICINE

## 2021-09-16 PROCEDURE — 80053 COMPREHEN METABOLIC PANEL: CPT | Performed by: INTERNAL MEDICINE

## 2021-09-16 PROCEDURE — 90662 IIV NO PRSV INCREASED AG IM: CPT | Performed by: INTERNAL MEDICINE

## 2021-09-16 PROCEDURE — 36415 COLL VENOUS BLD VENIPUNCTURE: CPT | Performed by: INTERNAL MEDICINE

## 2021-09-16 PROCEDURE — 82043 UR ALBUMIN QUANTITATIVE: CPT | Performed by: INTERNAL MEDICINE

## 2021-09-16 PROCEDURE — 83036 HEMOGLOBIN GLYCOSYLATED A1C: CPT | Performed by: INTERNAL MEDICINE

## 2021-09-16 RX ORDER — CIPROFLOXACIN 500 MG/1
500 TABLET, FILM COATED ORAL 2 TIMES DAILY
Qty: 14 TABLET | Refills: 0 | Status: SHIPPED | OUTPATIENT
Start: 2021-09-16 | End: 2022-06-06

## 2021-09-16 ASSESSMENT — ASTHMA QUESTIONNAIRES
QUESTION_4 LAST FOUR WEEKS HOW OFTEN HAVE YOU USED YOUR RESCUE INHALER OR NEBULIZER MEDICATION (SUCH AS ALBUTEROL): NOT AT ALL
QUESTION_2 LAST FOUR WEEKS HOW OFTEN HAVE YOU HAD SHORTNESS OF BREATH: NOT AT ALL
QUESTION_5 LAST FOUR WEEKS HOW WOULD YOU RATE YOUR ASTHMA CONTROL: WELL CONTROLLED
HOSPITALIZATION_OVERNIGHT_LAST_YEAR_TOTAL: ONE
QUESTION_3 LAST FOUR WEEKS HOW OFTEN DID YOUR ASTHMA SYMPTOMS (WHEEZING, COUGHING, SHORTNESS OF BREATH, CHEST TIGHTNESS OR PAIN) WAKE YOU UP AT NIGHT OR EARLIER THAN USUAL IN THE MORNING: NOT AT ALL
QUESTION_1 LAST FOUR WEEKS HOW MUCH OF THE TIME DID YOUR ASTHMA KEEP YOU FROM GETTING AS MUCH DONE AT WORK, SCHOOL OR AT HOME: NONE OF THE TIME
ACT_TOTALSCORE: 24
EMERGENCY_ROOM_LAST_YEAR_TOTAL: ONE

## 2021-09-16 ASSESSMENT — ACTIVITIES OF DAILY LIVING (ADL): CURRENT_FUNCTION: NO ASSISTANCE NEEDED

## 2021-09-16 NOTE — PROGRESS NOTES
"Answers for HPI/ROS submitted by the patient on 9/16/2021  In general, how would you rate your overall physical health?: good  Frequency of exercise:: 1 day/week  Do you usually eat at least 4 servings of fruit and vegetables a day, include whole grains & fiber, and avoid regularly eating high fat or \"junk\" foods? : No  Taking medications regularly:: No  Medication side effects:: Lightheadedness  Activities of Daily Living: no assistance needed  Home safety: no safety concerns identified  Hearing Impairment:: difficulty understanding soft or whispered speech  In the past 6 months, have you been bothered by leaking of urine?: Yes  In general, how would you rate your overall mental or emotional health?: excellent  Additional concerns today:: No  Duration of exercise:: Less than 15 minutes    SUBJECTIVE:   Nadir Cantu is a 71 year old male who presents for Preventive Visit.    HPI;  His CATHERINE is not cardiac.  Possibly an element of asthma.  Likely central obesity plays a role.  He has not been able to lose weight.  He otherwise feels well, using CPAP. No changes in bowel function.  Chronic hesitancy, nocturia, and mild dysuria.  The urostim has not helped symptoms much.  Continues to use myrbetriq and tamsulosin.  No fever, or chills. He has fear of repeat sepsis that he had before in [place].      {Healthy Habits:     In general, how would you rate your overall health?  Good    Frequency of exercise:  1 day/week    Duration of exercise:  Less than 15 minutes    Do you usually eat at least 4 servings of fruit and vegetables a day, include whole grains    & fiber and avoid regularly eating high fat or \"junk\" foods?  No    Taking medications regularly:  No    Medication side effects:  Lightheadedness    Ability to successfully perform activities of daily living:  No assistance needed    Home Safety:  No safety concerns identified    Hearing Impairment:  Difficulty understanding soft or whispered speech    In the " past 6 months, have you been bothered by leaking of urine? Yes    In general, how would you rate your overall mental or emotional health?  Excellent      PHQ-2 Total Score: 0    Additional concerns today:  No    Do you feel safe in your environment? Yes    Cognitive Screening   1) Repeat 3 items all three  2) Clock draw: NORMAL  3) 3 item recall: Recalls 3 objects  Results: 3 items recalled: COGNITIVE IMPAIRMENT LESS LIKELY    Mini-CogTM Copyright JOSE DANIEL Lau. Licensed by the author for use in [facility]; reprinted with permission (abilio@[facility]). All rights reserved.      Do you have sleep apnea, excessive snoring or daytime drowsiness?: uses CPAP    Reviewed and updated as needed this visit by clinical staff    Reviewed and updated as needed this visit by Provider    Social History     Tobacco Use     Smoking status: Never Smoker     Smokeless tobacco: Never Used   Substance Use Topics     Alcohol use: No     Alcohol Use 9/16/2021   Prescreen: >3 drinks/day or >7 drinks/week? No     Patient Care Team:  Adolfo Kraus MD as Assigned PCP  Carol Last NP as Assigned Heart and Vascular Provider    The following health maintenance items are reviewed in Epic and correct as of today:  Health Maintenance Due   Topic Date Due     DIABETIC FOOT EXAM  Never done     ANNUAL REVIEW OF HM ORDERS  Never done     ASTHMA ACTION PLAN  Never done     ZOSTER IMMUNIZATION (2 of 3) 03/20/2012     AORTIC ANEURYSM SCREENING (SYSTEM ASSIGNED)  01/20/2015     MEDICARE ANNUAL WELLNESS VISIT  06/27/2020     LIPID  08/06/2021     FALL RISK ASSESSMENT  08/06/2021     A1C  08/18/2021     INFLUENZA VACCINE (1) 09/01/2021     BMP  09/17/2021     MICROALBUMIN  10/15/2021     Review of Systems  See HPI    OBJECTIVE:     PHYSICAL EXAM:  General Appearance: In no acute distress  /80 (BP Location: Left arm, Patient Position: Sitting, Cuff Size: Adult Large)   Pulse 64   Wt 104.3 kg (230 lb)   SpO2 99%   BMI 30.34 kg/m   "  NECK:  without cervical or axillary adenopathy  RESPIRATORY: Clear   CARDIOVASCULAR: S1, S2  ABDOMEN: soft, flat, and non-tender, without mass, rebound, or guarding  EXTREMITIES: No joint swelling, no ulcer or edema  NEUROLOGIC: No arm or leg  weakness, speech is clear, gait is normal    ASSESSMENT / PLAN:     (G47.33) Obstructive sleep apnea syndrome  (primary encounter diagnosis)  Comment: using CPAP    (Z13.220) Hyperlipidemia  Comment: ongoing statin therapy    (I25.10,  I25.83) Coronary artery disease due to lipid rich plaque  Comment: stable without active angina, recent evaluation negative.   Plan: no changes in current treatments.     (E11.9) Type 2 diabetes mellitus without complication (H)  Comment: fasting hyperglycemia persists.    Plan: will increase empaglifozin to 25 mg po daily.   HEMOGLOBIN A1C, Albumin Random Urine         Quantitative with Creat Ratio, empagliflozin         (JARDIANCE) 25 MG TABS tablet,      (I10) Essential hypertension  Comment: good control  Plan: Comprehensive metabolic panel        No change in treatment    (E66.01) Morbid (severe) obesity due to excess calories (H)  Comment: he needs further weight loss.    Plan: we discussed increased efforts.     (N40.1,  R39.11) Benign prostatic hyperplasia with urinary hesitancy  Comment: chronic symptoms.  Can't exclude UTI or prostatic infection, these seem less likely.  Will test.  He will follow up with urology, Dr. Choudhury.   Plan: UA Macro with Reflex to Micro and Culture - lab        collect, ciprofloxacin (CIPRO) 500 MG tablet    Patient has been advised of split billing requirements and indicates understanding: Yes  COUNSELING:  Reviewed preventive health counseling, as reflected in patient instructions       weight loss and diet       Regular exercise    Estimated body mass index is 30.15 kg/m  as calculated from the following:    Height as of 3/16/21: 1.854 m (6' 1\").    Weight as of 7/12/21: 103.6 kg (228 lb 8 oz).    He " reports that he has never smoked. He has never used smokeless tobacco.    Appropriate preventive services were discussed with this patient, including applicable screening as appropriate for cardiovascular disease, diabetes, osteopenia/osteoporosis, and glaucoma.  As appropriate for age/gender, discussed screening for colorectal cancer, prostate cancer, breast cancer, and cervical cancer. Checklist reviewing preventive services available has been given to the patient.    Adolfo Kraus MD  [facility]    Identified Health Risks: coronary artery disease, obesity

## 2021-09-17 ENCOUNTER — MYC MEDICAL ADVICE (OUTPATIENT)
Dept: INTERNAL MEDICINE | Facility: CLINIC | Age: 71
End: 2021-09-17

## 2021-09-17 ASSESSMENT — ASTHMA QUESTIONNAIRES: ACT_TOTALSCORE: 24

## 2021-12-07 ENCOUNTER — MYC REFILL (OUTPATIENT)
Dept: PULMONOLOGY | Facility: OTHER | Age: 71
End: 2021-12-07
Payer: COMMERCIAL

## 2021-12-07 ENCOUNTER — MYC REFILL (OUTPATIENT)
Dept: INTERNAL MEDICINE | Facility: CLINIC | Age: 71
End: 2021-12-07
Payer: COMMERCIAL

## 2021-12-07 DIAGNOSIS — R07.9 ACUTE CHEST PAIN: ICD-10-CM

## 2021-12-07 DIAGNOSIS — E11.9 TYPE 2 DIABETES MELLITUS WITHOUT COMPLICATION, WITHOUT LONG-TERM CURRENT USE OF INSULIN (H): ICD-10-CM

## 2021-12-07 DIAGNOSIS — F41.0 PANIC ANXIETY SYNDROME: ICD-10-CM

## 2021-12-07 DIAGNOSIS — K21.00 REFLUX ESOPHAGITIS: ICD-10-CM

## 2021-12-07 DIAGNOSIS — J45.30 MILD PERSISTENT ASTHMA WITHOUT COMPLICATION: ICD-10-CM

## 2021-12-07 DIAGNOSIS — R30.0 DYSURIA: ICD-10-CM

## 2021-12-07 DIAGNOSIS — J45.41 MODERATE PERSISTENT ASTHMA WITH EXACERBATION: ICD-10-CM

## 2021-12-07 DIAGNOSIS — K21.9 GASTROESOPHAGEAL REFLUX DISEASE WITHOUT ESOPHAGITIS: Primary | ICD-10-CM

## 2021-12-07 RX ORDER — ALBUTEROL SULFATE 90 UG/1
2 AEROSOL, METERED RESPIRATORY (INHALATION) EVERY 6 HOURS
Qty: 18 G | Refills: 3 | Status: SHIPPED | OUTPATIENT
Start: 2021-12-07

## 2021-12-09 RX ORDER — METFORMIN HCL 500 MG
1000 TABLET, EXTENDED RELEASE 24 HR ORAL
Qty: 180 TABLET | Refills: 2 | Status: SHIPPED | OUTPATIENT
Start: 2021-12-09 | End: 2022-10-10

## 2021-12-09 RX ORDER — ESCITALOPRAM OXALATE 10 MG/1
10 TABLET ORAL DAILY
Qty: 90 TABLET | Refills: 2 | Status: SHIPPED | OUTPATIENT
Start: 2021-12-09 | End: 2022-06-06

## 2021-12-09 RX ORDER — NITROGLYCERIN 0.3 MG/1
0.3 TABLET SUBLINGUAL EVERY 5 MIN PRN
Qty: 25 TABLET | Refills: 4 | Status: SHIPPED | OUTPATIENT
Start: 2021-12-09 | End: 2024-07-05

## 2021-12-09 RX ORDER — TAMSULOSIN HYDROCHLORIDE 0.4 MG/1
0.8 CAPSULE ORAL DAILY
Qty: 180 CAPSULE | Refills: 2 | Status: SHIPPED | OUTPATIENT
Start: 2021-12-09 | End: 2022-06-06

## 2021-12-09 NOTE — TELEPHONE ENCOUNTER
"Routing refill request to provider for review/approval because:  Nitrates require provider's review. Did not put in a qty because last requested was \"1 bottle\" and RN unsure what 1 bottle equals.  PPI requires provider's review since pt is on Plavix.    Last Written Prescription:      Last office visit provider:  9/16/21    Requested Prescriptions   Pending Prescriptions Disp Refills     nitroGLYcerin (NITROSTAT) 0.3 MG sublingual tablet       Sig: Place 1 tablet (0.3 mg) under the tongue every 5 minutes as needed       Nitrates Failed - 12/7/2021  3:49 PM        Failed - Sublingual nitro order needs review     If refill exceeds 1 bottle per month, please forward request to provider.           Passed - Blood pressure under 140/90 in past 12 months     BP Readings from Last 3 Encounters:   09/16/21 101/80   07/12/21 104/74   03/24/21 108/72                 Passed - Pt is not on erectile dysfunction medications        Passed - Recent (12 mo) or future (30 days) visit within the authorizing provider's specialty     Patient has had an office visit with the authorizing provider or a provider within the authorizing providers department within the previous 12 mos or has a future within next 30 days. See \"Patient Info\" tab in inbasket, or \"Choose Columns\" in Meds & Orders section of the refill encounter.              Passed - Medication is active on med list        Passed - Patient is age 18 or older           omeprazole (PRILOSEC) 20 MG DR capsule 180 capsule 3     Sig: Take 1 capsule (20 mg) by mouth 2 times daily (before meals)       PPI Protocol Failed - 12/7/2021  3:49 PM        Failed - Not on Clopidogrel (unless Pantoprazole ordered)        Passed - No diagnosis of osteoporosis on record        Passed - Recent (12 mo) or future (30 days) visit within the authorizing provider's specialty     Patient has had an office visit with the authorizing provider or a provider within the authorizing providers department within " "the previous 12 mos or has a future within next 30 days. See \"Patient Info\" tab in inbasket, or \"Choose Columns\" in Meds & Orders section of the refill encounter.              Passed - Medication is active on med list        Passed - Patient is age 18 or older           tamsulosin (FLOMAX) 0.4 MG capsule 180 capsule 3       Alpha Blockers Passed - 12/7/2021  3:49 PM        Passed - Blood pressure under 140/90 in past 12 months     BP Readings from Last 3 Encounters:   09/16/21 101/80   07/12/21 104/74   03/24/21 108/72                 Passed - Recent (12 mo) or future (30 days) visit within the authorizing provider's specialty     Patient has had an office visit with the authorizing provider or a provider within the authorizing providers department within the previous 12 mos or has a future within next 30 days. See \"Patient Info\" tab in inbasket, or \"Choose Columns\" in Meds & Orders section of the refill encounter.              Passed - Patient does not have Tadalafil, Vardenafil, or Sildenafil on their medication list        Passed - Medication is active on med list        Passed - Patient is 18 years of age or older           escitalopram (LEXAPRO) 10 MG tablet 90 tablet 3       SSRIs Protocol Passed - 12/7/2021  3:49 PM        Passed - Recent (12 mo) or future (30 days) visit within the authorizing provider's specialty     Patient has had an office visit with the authorizing provider or a provider within the authorizing providers department within the previous 12 mos or has a future within next 30 days. See \"Patient Info\" tab in inbasket, or \"Choose Columns\" in Meds & Orders section of the refill encounter.              Passed - Medication is active on med list        Passed - Patient is age 18 or older           metFORMIN (GLUCOPHAGE-XR) 500 MG 24 hr tablet 180 tablet 3     Sig: Take 2 tablets (1,000 mg) by mouth daily (with dinner)       Biguanide Agents Passed - 12/7/2021  3:49 PM        Passed - Patient is " "age 10 or older        Passed - Patient has documented A1c within the specified period of time.     If HgbA1C is 8 or greater, it needs to be on file within the past 3 months.  If less than 8, must be on file within the past 6 months.     Recent Labs   Lab Test 09/16/21  1324   A1C 7.8*             Passed - Patient's CR is NOT>1.4 OR Patient's EGFR is NOT<45 within past 12 mos.     Recent Labs   Lab Test 09/16/21  1324 09/17/20  0733   GFRESTIMATED 70 >60   GFRESTBLACK  --  >60       Recent Labs   Lab Test 09/16/21  1324   CR 1.06             Passed - Patient does NOT have a diagnosis of CHF.        Passed - Medication is active on med list        Passed - Recent (6 mo) or future (30 days) visit within the authorizing provider's specialty     Patient had office visit in the last 6 months or has a visit in the next 30 days with authorizing provider or within the authorizing provider's specialty.  See \"Patient Info\" tab in inbasket, or \"Choose Columns\" in Meds & Orders section of the refill encounter.                 Josseline Loaiza RN 12/08/21 7:26 PM  "

## 2021-12-13 ENCOUNTER — MYC MEDICAL ADVICE (OUTPATIENT)
Dept: INTERNAL MEDICINE | Facility: CLINIC | Age: 71
End: 2021-12-13
Payer: COMMERCIAL

## 2021-12-16 DIAGNOSIS — E78.5 HYPERLIPIDEMIA LDL GOAL <100: ICD-10-CM

## 2021-12-16 DIAGNOSIS — E11.9 TYPE 2 DIABETES, HBA1C GOAL < 8% (H): Primary | ICD-10-CM

## 2021-12-17 ENCOUNTER — LAB (OUTPATIENT)
Dept: LAB | Facility: CLINIC | Age: 71
End: 2021-12-17
Payer: COMMERCIAL

## 2021-12-17 DIAGNOSIS — E78.5 HYPERLIPIDEMIA LDL GOAL <100: ICD-10-CM

## 2021-12-17 DIAGNOSIS — E11.9 TYPE 2 DIABETES, HBA1C GOAL < 8% (H): ICD-10-CM

## 2021-12-17 LAB
CHOLEST SERPL-MCNC: 163 MG/DL
FASTING STATUS PATIENT QL REPORTED: ABNORMAL
HBA1C MFR BLD: 7.1 % (ref 0–5.6)
HDLC SERPL-MCNC: 38 MG/DL
LDLC SERPL CALC-MCNC: 84 MG/DL
TRIGL SERPL-MCNC: 205 MG/DL

## 2021-12-17 PROCEDURE — 36415 COLL VENOUS BLD VENIPUNCTURE: CPT

## 2021-12-17 PROCEDURE — 80061 LIPID PANEL: CPT

## 2021-12-17 PROCEDURE — 83036 HEMOGLOBIN GLYCOSYLATED A1C: CPT

## 2021-12-22 ENCOUNTER — TRANSFERRED RECORDS (OUTPATIENT)
Dept: HEALTH INFORMATION MANAGEMENT | Facility: CLINIC | Age: 71
End: 2021-12-22
Payer: COMMERCIAL

## 2021-12-22 LAB — RETINOPATHY: NEGATIVE

## 2022-01-13 ENCOUNTER — TRANSFERRED RECORDS (OUTPATIENT)
Dept: HEALTH INFORMATION MANAGEMENT | Facility: CLINIC | Age: 72
End: 2022-01-13
Payer: COMMERCIAL

## 2022-01-19 ENCOUNTER — MYC MEDICAL ADVICE (OUTPATIENT)
Dept: INTERNAL MEDICINE | Facility: CLINIC | Age: 72
End: 2022-01-19
Payer: COMMERCIAL

## 2022-01-19 DIAGNOSIS — E78.5 DYSLIPIDEMIA, GOAL LDL BELOW 70: ICD-10-CM

## 2022-01-19 DIAGNOSIS — I10 ESSENTIAL HYPERTENSION: ICD-10-CM

## 2022-01-20 RX ORDER — CHLORTHALIDONE 25 MG/1
12.5 TABLET ORAL DAILY
Qty: 45 TABLET | Refills: 3 | Status: SHIPPED | OUTPATIENT
Start: 2022-01-20 | End: 2023-02-03

## 2022-01-20 RX ORDER — ATORVASTATIN CALCIUM 80 MG/1
80 TABLET, FILM COATED ORAL DAILY
Qty: 90 TABLET | Refills: 2 | Status: SHIPPED | OUTPATIENT
Start: 2022-01-20 | End: 2022-12-08

## 2022-06-01 ENCOUNTER — TELEPHONE (OUTPATIENT)
Dept: INTERNAL MEDICINE | Facility: CLINIC | Age: 72
End: 2022-06-01
Payer: COMMERCIAL

## 2022-06-01 NOTE — TELEPHONE ENCOUNTER
Please let them know that it is OK to hold the xarelto three days prior to the epidural injection. Dr. Nayana MD

## 2022-06-01 NOTE — TELEPHONE ENCOUNTER
Reason for Call:  Other call back    Detailed comments: [facility] Calling and asking to hold Xarelto 3 days prior to epidural steroid injection in lower back.  Not scheduled yet but wanted to get authorization first.    Radha Peterson-Alexus [phone]      Best Time: any    Can we leave a detailed message on this number? YES    Call taken on 6/1/2022 at 2:43 PM by Pam J. Behr

## 2022-06-06 ENCOUNTER — OFFICE VISIT (OUTPATIENT)
Dept: FAMILY MEDICINE | Facility: CLINIC | Age: 72
End: 2022-06-06
Payer: COMMERCIAL

## 2022-06-06 VITALS
HEART RATE: 88 BPM | SYSTOLIC BLOOD PRESSURE: 114 MMHG | BODY MASS INDEX: 30.54 KG/M2 | OXYGEN SATURATION: 98 % | DIASTOLIC BLOOD PRESSURE: 72 MMHG | WEIGHT: 230.4 LBS | HEIGHT: 73 IN

## 2022-06-06 DIAGNOSIS — E11.9 TYPE 2 DIABETES MELLITUS WITHOUT COMPLICATION, UNSPECIFIED WHETHER LONG TERM INSULIN USE (H): ICD-10-CM

## 2022-06-06 DIAGNOSIS — E66.01 MORBID (SEVERE) OBESITY DUE TO EXCESS CALORIES (H): ICD-10-CM

## 2022-06-06 DIAGNOSIS — I73.9 PERIPHERAL VASCULAR DISEASE (H): ICD-10-CM

## 2022-06-06 DIAGNOSIS — M67.40 GANGLION CYST: Primary | ICD-10-CM

## 2022-06-06 PROBLEM — R39.15 URINARY URGENCY: Status: ACTIVE | Noted: 2021-01-15

## 2022-06-06 PROBLEM — R39.198 SLOWING OF URINARY STREAM: Status: ACTIVE | Noted: 2019-10-25

## 2022-06-06 PROBLEM — R97.20 RAISED PROSTATE SPECIFIC ANTIGEN: Status: ACTIVE | Noted: 2019-12-12

## 2022-06-06 LAB — HBA1C MFR BLD: 7.3 % (ref 0–5.6)

## 2022-06-06 PROCEDURE — 83036 HEMOGLOBIN GLYCOSYLATED A1C: CPT | Performed by: FAMILY MEDICINE

## 2022-06-06 PROCEDURE — 99213 OFFICE O/P EST LOW 20 MIN: CPT | Performed by: FAMILY MEDICINE

## 2022-06-06 PROCEDURE — 36415 COLL VENOUS BLD VENIPUNCTURE: CPT | Performed by: FAMILY MEDICINE

## 2022-06-06 RX ORDER — TIZANIDINE 2 MG/1
TABLET ORAL
COMMUNITY
Start: 2022-01-18 | End: 2022-10-10

## 2022-06-06 RX ORDER — TRAMADOL HYDROCHLORIDE 50 MG/1
TABLET ORAL
COMMUNITY
Start: 2021-09-17

## 2022-06-06 ASSESSMENT — ASTHMA QUESTIONNAIRES
QUESTION_3 LAST FOUR WEEKS HOW OFTEN DID YOUR ASTHMA SYMPTOMS (WHEEZING, COUGHING, SHORTNESS OF BREATH, CHEST TIGHTNESS OR PAIN) WAKE YOU UP AT NIGHT OR EARLIER THAN USUAL IN THE MORNING: ONCE OR TWICE
QUESTION_4 LAST FOUR WEEKS HOW OFTEN HAVE YOU USED YOUR RESCUE INHALER OR NEBULIZER MEDICATION (SUCH AS ALBUTEROL): TWO OR THREE TIMES PER WEEK
QUESTION_5 LAST FOUR WEEKS HOW WOULD YOU RATE YOUR ASTHMA CONTROL: WELL CONTROLLED
ACT_TOTALSCORE: 17
QUESTION_2 LAST FOUR WEEKS HOW OFTEN HAVE YOU HAD SHORTNESS OF BREATH: ONCE A DAY
ACT_TOTALSCORE: 17
QUESTION_1 LAST FOUR WEEKS HOW MUCH OF THE TIME DID YOUR ASTHMA KEEP YOU FROM GETTING AS MUCH DONE AT WORK, SCHOOL OR AT HOME: A LITTLE OF THE TIME

## 2022-06-06 NOTE — PROGRESS NOTES
"Index  Assessment & Plan     ICD-10-CM    1. Ganglion cyst  M67.40    2. Type 2 diabetes mellitus without complication, unspecified whether long term insulin use (H)  E11.9 HEMOGLOBIN A1C     HEMOGLOBIN A1C   3. Peripheral vascular disease (H)  I73.9    4. Morbid (severe) obesity due to excess calories (H)  E66.01      Medication making: Patient has been doing some gardening and has noted a cystic structure on her knuckles last finger of both hands.  This appears to be ganglion cyst.  This is discussed with the patient.  Patient has a pending A1c from PCP regarding his diabetes.  This is done and reveals diabetes A1c in fair range.  Best practice advisory mentions peripheral vascular disease.  He follows with cardiology.  Best practice advisory mentions morbid obesity due to complication with diabetes and GERD.  He will benefit from some weight loss.  This is discussed with the patient.         BMI:   Estimated body mass index is 30.4 kg/m  as calculated from the following:    Height as of this encounter: 1.854 m (6' 1\").    Weight as of this encounter: 104.5 kg (230 lb 6.4 oz).   Weight management plan: Discussed healthy diet and exercise guidelines    MEDICATIONS:  Continue current medications without change  See Patient Instructions    Return in about 3 months (around 9/6/2022) for Routine preventive.    Florina Monson MD  [facility]    Kush Schmitz is a 72 year old who presents for the following health issues  accompanied by his N/A.    History of Present Illness       Reason for visit:  Problem with both index fingers. A1C test as well.  Symptom onset:  3-4 weeks ago  Symptoms include:  Lumps on fingers  Symptom intensity:  Moderate  Symptom progression:  Staying the same  Had these symptoms before:  No    He eats 2-3 servings of fruits and vegetables daily.He consumes 6 sweetened beverage(s) daily.He exercises with enough effort to increase his heart rate 10 to 19 " "minutes per day.  He is missing 2 dose(s) of medications per week.  He is not taking prescribed medications regularly due to remembering to take.           Review of Systems   All other systems reviewed and are negative.     Constitutional, HEENT, cardiovascular, pulmonary, gi and gu systems are negative, except as otherwise noted.      Objective    /72 (BP Location: Left arm, Patient Position: Sitting, Cuff Size: Adult Large)   Pulse 88   Ht 1.854 m (6' 1\")   Wt 104.5 kg (230 lb 6.4 oz)   SpO2 98%   BMI 30.40 kg/m    Body mass index is 30.4 kg/m .  Physical Exam   Examination of bilateral hand shows on the knuckles of both index finger there is a cystic small mass consistent with ganglion cyst.  On the left hand it is about 0.5 cm in on the right hand its about a quarter of a centimeter.  Nontender and freely mobile.    Results for orders placed or performed in visit on 06/06/22   HEMOGLOBIN A1C     Status: Abnormal   Result Value Ref Range    Hemoglobin A1C 7.3 (H) 0.0 - 5.6 %               "

## 2022-06-09 ENCOUNTER — OFFICE VISIT (OUTPATIENT)
Dept: CARDIOLOGY | Facility: CLINIC | Age: 72
End: 2022-06-09
Payer: COMMERCIAL

## 2022-06-09 VITALS
HEIGHT: 73 IN | BODY MASS INDEX: 29.95 KG/M2 | HEART RATE: 82 BPM | RESPIRATION RATE: 20 BRPM | DIASTOLIC BLOOD PRESSURE: 72 MMHG | WEIGHT: 226 LBS | SYSTOLIC BLOOD PRESSURE: 128 MMHG

## 2022-06-09 DIAGNOSIS — I65.29 STENOSIS OF CAROTID ARTERY, UNSPECIFIED LATERALITY: ICD-10-CM

## 2022-06-09 DIAGNOSIS — I25.10 CORONARY ARTERY DISEASE INVOLVING NATIVE CORONARY ARTERY OF NATIVE HEART WITHOUT ANGINA PECTORIS: Primary | ICD-10-CM

## 2022-06-09 PROCEDURE — 99214 OFFICE O/P EST MOD 30 MIN: CPT | Performed by: INTERNAL MEDICINE
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
## 2022-06-09 NOTE — PATIENT INSTRUCTIONS
Great to meet you again!    Let's plan to continue current meds    Re-check an event monitor and a carotid US    Continue exercise regimen and healthy diet    Follow up in 1 year or as needed
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
## 2022-06-16 ENCOUNTER — MYC MEDICAL ADVICE (OUTPATIENT)
Dept: FAMILY MEDICINE | Facility: CLINIC | Age: 72
End: 2022-06-16
Payer: COMMERCIAL

## 2022-06-16 DIAGNOSIS — M67.40 GANGLION CYST: Primary | ICD-10-CM

## 2022-06-20 ENCOUNTER — HOSPITAL ENCOUNTER (OUTPATIENT)
Dept: CARDIOLOGY | Facility: HOSPITAL | Age: 72
Discharge: HOME | End: 2022-06-20
Attending: INTERNAL MEDICINE | Admitting: INTERNAL MEDICINE
Payer: COMMERCIAL

## 2022-06-20 DIAGNOSIS — I25.10 CORONARY ARTERY DISEASE INVOLVING NATIVE CORONARY ARTERY OF NATIVE HEART WITHOUT ANGINA PECTORIS: ICD-10-CM

## 2022-06-20 DIAGNOSIS — I65.29 STENOSIS OF CAROTID ARTERY, UNSPECIFIED LATERALITY: ICD-10-CM

## 2022-06-20 PROCEDURE — 93270 REMOTE 30 DAY ECG REV/REPORT: CPT

## 2022-07-01 PROCEDURE — 93272 ECG/REVIEW INTERPRET ONLY: CPT | Performed by: INTERNAL MEDICINE

## 2022-07-06 ENCOUNTER — HOSPITAL ENCOUNTER (OUTPATIENT)
Dept: ULTRASOUND IMAGING | Facility: HOSPITAL | Age: 72
Discharge: HOME | End: 2022-07-06
Attending: INTERNAL MEDICINE | Admitting: INTERNAL MEDICINE
Payer: COMMERCIAL

## 2022-07-06 DIAGNOSIS — I65.29 STENOSIS OF CAROTID ARTERY, UNSPECIFIED LATERALITY: ICD-10-CM

## 2022-07-06 DIAGNOSIS — I25.10 CORONARY ARTERY DISEASE INVOLVING NATIVE CORONARY ARTERY OF NATIVE HEART WITHOUT ANGINA PECTORIS: ICD-10-CM

## 2022-07-06 PROCEDURE — 93880 EXTRACRANIAL BILAT STUDY: CPT

## 2022-07-15 ENCOUNTER — OFFICE VISIT (OUTPATIENT)
Dept: ORTHOPEDICS | Facility: CLINIC | Age: 72
End: 2022-07-15

## 2022-07-15 ENCOUNTER — ANCILLARY PROCEDURE (OUTPATIENT)
Dept: GENERAL RADIOLOGY | Facility: CLINIC | Age: 72
End: 2022-07-15
Attending: PEDIATRICS
Payer: COMMERCIAL

## 2022-07-15 VITALS
SYSTOLIC BLOOD PRESSURE: 110 MMHG | HEIGHT: 73 IN | DIASTOLIC BLOOD PRESSURE: 70 MMHG | WEIGHT: 226 LBS | BODY MASS INDEX: 29.95 KG/M2

## 2022-07-15 DIAGNOSIS — R22.33: ICD-10-CM

## 2022-07-15 DIAGNOSIS — R22.33: Primary | ICD-10-CM

## 2022-07-15 DIAGNOSIS — L98.9 SKIN LESION OF HAND: ICD-10-CM

## 2022-07-15 PROCEDURE — 73140 X-RAY EXAM OF FINGER(S): CPT | Mod: TC | Performed by: RADIOLOGY

## 2022-07-15 PROCEDURE — 99203 OFFICE O/P NEW LOW 30 MIN: CPT | Performed by: PEDIATRICS

## 2022-07-15 NOTE — PROGRESS NOTES
ASSESSMENT & PLAN    Diagnoses and all orders for this visit:    Mass of finger, bilateral  -     XR Finger Bilateral G/E 2 vw; Future    Skin lesion of hand    Other orders  -     Orthopedic  Referral      Consistent with a dermatologic condition. Findings at level of skin, mobile, not appearing associated with underlying joint or tendon.  Reviewed considerations with monitoring, potential medications (through derm), potential injection therapy (through other specialty), other eval.  I don't think these are clearly cystic with indication for aspiration.   He has dermatology contact. Plan that next.  Follow-up here prn.  Questions answered. Discussed signs and symptoms that may indicate more serious issues; the patient was instructed to seek appropriate care if noted. Nadir indicates understanding of these issues and agrees with the plan.        See Patient Instructions  Patient Instructions   Lesions over the dorsal aspect of the index finger MCP joint bilaterally I think are most consistent with knuckle pads, which is a thickening of tissue over the knuckle in the skin.  I do not think these are clearly musculoskeletal related, given overall clinical appearance; also, findings are more mobile within the skin rather than mobile with digit motion.  It is possible there is some cystic component to it, but I think these are more likely solid.  Reviewed options with monitoring (primarily more so if asymptomatic), additional imaging (considerations include ultrasound, to evaluate for cystic structure, possibly MRI), potential for aspiration if cystic structure confirmed, and referral on for further treatment for presumed knuckle pads.  Given these are symptomatic, would plan to have you see another provider.  With already established relationship with dermatology, plan for you to see them next.  Follow-up here is open ended. Contact clinic if any questions/concerns.    If you have any further questions for  "your physician or physician s care team you can call [phone] and use option 3 to leave a voice message. Calls received during business hours will be returned same day.        Malachi Flores DO  [facility] [place]      CC: Florina Monson      -----  No chief complaint on file.      SUBJECTIVE  Nadir Catnu is a/an 72 year old male who is seen in consultation at the request of  Florina Monson M.D. for evaluation of bilateral second MCP joint masses.  He has bumps on both index finger MCP joints.  Noticed them this spring with a lot of gardening. Only painful if he hits them. .     The patient is seen by themselves.  The patient is Right handed    Onset: 3-4 month(s) ago. Reports insidious onset without acute precipitating event.  Location of Pain: bilateral second MCP joints   Worsened by: pressure   Better with: nothing   Treatments tried: no treatment tried to date  Associated symptoms: no distal numbness or tingling; denies swelling or warmth    Orthopedic/Surgical history: NO  Social History/Occupation: retired     No family history pertinent to patient's problem today.     **  Occasionally pain at rest, but generally pain free at rest. Pain sometimes with motion on left. Size left > right.    Pain is more with bumping.  Can wax/wane in size. Can also get appearance of some ecchymosis there.  No noted mechanical symptoms.      REVIEW OF SYSTEMS:  Review of Systems   All other systems reviewed and are negative.        OBJECTIVE:  /70   Ht 1.854 m (6' 1\")   Wt 102.5 kg (226 lb)   BMI 29.82 kg/m     General: healthy, alert and in no distress  HEENT: no scleral icterus or conjunctival erythema  Skin: No jaundice.  CV: distal perfusion intact   Resp: normal respiratory effort without conversational dyspnea   Psych: normal mood and affect  Gait: normal steady gait with appropriate coordination and balance   Neuro: Normal light sensory exam of extremity "       Hand/wrist (bilateral):    Inspection:  No deformity noted.  No other swelling. No clear ecchymosis.  Focal, somewhat firm, nodular to plaque-like prominence, circumscribed, over each index MCP joint. Mobile, with skin.    Motion:  Digit motion full, no pain  Skin lesions do not appear to move with extensor tendons or with joint motion    Sensation:  Grossly intact.    Radial pulses normal, +2/4, capillary refill brisk.    Palpation:  Tender with direct pressure over skin lesion  Nontender remainder, index MCP joints elsewhere      RADIOLOGY:  I independently ordered, visualized and reviewed these images with the patient  No acute bony abnormality. Some degenerative change, no clear concerns at 2nd MCP joints to cause exam findings.    XR Finger Bilateral G/E 2 vw    Narrative    XR FINGER BILATERAL G/E 2 VW 7/15/2022 2:51 PM     HISTORY: Mass of finger, bilateral    COMPARISON: None.      Impression    IMPRESSION: No focal soft tissue abnormality is evident. Moderate  degenerative changes in the IP joint of the right thumb. Mild  degenerative changes in the first CMC joints and multiple bilateral  MCP and IP joints.    REYNA INMAN MD         SYSTEM ID:  [id number]

## 2022-07-15 NOTE — LETTER
7/15/2022         RE: Nadir Cantu  [address] [address]        Dear Colleague,    Thank you for referring your patient, Nadir Cantu, to the [facility] [place]. Please see a copy of my visit note below.    ASSESSMENT & PLAN    Diagnoses and all orders for this visit:    Mass of finger, bilateral  -     XR Finger Bilateral G/E 2 vw; Future    Skin lesion of hand    Other orders  -     Orthopedic  Referral      Consistent with a dermatologic condition. Findings at level of skin, mobile, not appearing associated with underlying joint or tendon.  Reviewed considerations with monitoring, potential medications (through derm), potential injection therapy (through other specialty), other eval.  I don't think these are clearly cystic with indication for aspiration.   He has dermatology contact. Plan that next.  Follow-up here prn.  Questions answered. Discussed signs and symptoms that may indicate more serious issues; the patient was instructed to seek appropriate care if noted. Nadir indicates understanding of these issues and agrees with the plan.        See Patient Instructions  Patient Instructions   Lesions over the dorsal aspect of the index finger MCP joint bilaterally I think are most consistent with knuckle pads, which is a thickening of tissue over the knuckle in the skin.  I do not think these are clearly musculoskeletal related, given overall clinical appearance; also, findings are more mobile within the skin rather than mobile with digit motion.  It is possible there is some cystic component to it, but I think these are more likely solid.  Reviewed options with monitoring (primarily more so if asymptomatic), additional imaging (considerations include ultrasound, to evaluate for cystic structure, possibly MRI), potential for aspiration if cystic structure confirmed, and referral on for further treatment for presumed knuckle pads.  Given these  "are symptomatic, would plan to have you see another provider.  With already established relationship with dermatology, plan for you to see them next.  Follow-up here is open ended. Contact clinic if any questions/concerns.    If you have any further questions for your physician or physician s care team you can call [phone] and use option 3 to leave a voice message. Calls received during business hours will be returned same day.        Malachi Flores [facility] [place]      CC: Florina Monson      -----  No chief complaint on file.      SUBJECTIVE  Nadir Cantu is a/an 72 year old male who is seen in consultation at the request of  Florina Monson M.D. for evaluation of bilateral second MCP joint masses.  He has bumps on both index finger MCP joints.  Noticed them this spring with a lot of gardening. Only painful if he hits them. .     The patient is seen by themselves.  The patient is Right handed    Onset: 3-4 month(s) ago. Reports insidious onset without acute precipitating event.  Location of Pain: bilateral second MCP joints   Worsened by: pressure   Better with: nothing   Treatments tried: no treatment tried to date  Associated symptoms: no distal numbness or tingling; denies swelling or warmth    Orthopedic/Surgical history: NO  Social History/Occupation: retired     No family history pertinent to patient's problem today.     **  Occasionally pain at rest, but generally pain free at rest. Pain sometimes with motion on left. Size left > right.    Pain is more with bumping.  Can wax/wane in size. Can also get appearance of some ecchymosis there.  No noted mechanical symptoms.      REVIEW OF SYSTEMS:  Review of Systems   All other systems reviewed and are negative.        OBJECTIVE:  /70   Ht 1.854 m (6' 1\")   Wt 102.5 kg (226 lb)   BMI 29.82 kg/m     General: healthy, alert and in no distress  HEENT: no scleral icterus or conjunctival erythema  Skin: " No jaundice.  CV: distal perfusion intact   Resp: normal respiratory effort without conversational dyspnea   Psych: normal mood and affect  Gait: normal steady gait with appropriate coordination and balance   Neuro: Normal light sensory exam of extremity       Hand/wrist (bilateral):    Inspection:  No deformity noted.  No other swelling. No clear ecchymosis.  Focal, somewhat firm, nodular to plaque-like prominence, circumscribed, over each index MCP joint. Mobile, with skin.    Motion:  Digit motion full, no pain  Skin lesions do not appear to move with extensor tendons or with joint motion    Sensation:  Grossly intact.    Radial pulses normal, +2/4, capillary refill brisk.    Palpation:  Tender with direct pressure over skin lesion  Nontender remainder, index MCP joints elsewhere      RADIOLOGY:  I independently ordered, visualized and reviewed these images with the patient  No acute bony abnormality. Some degenerative change, no clear concerns at 2nd MCP joints to cause exam findings.    XR Finger Bilateral G/E 2 vw    Narrative    XR FINGER BILATERAL G/E 2 VW 7/15/2022 2:51 PM     HISTORY: Mass of finger, bilateral    COMPARISON: None.      Impression    IMPRESSION: No focal soft tissue abnormality is evident. Moderate  degenerative changes in the IP joint of the right thumb. Mild  degenerative changes in the first CMC joints and multiple bilateral  MCP and IP joints.    MALACHI INMAN MD         SYSTEM ID:  [id number]               Again, thank you for allowing me to participate in the care of your patient.        Sincerely,        Malachi Flores DO

## 2022-07-15 NOTE — PATIENT INSTRUCTIONS
Lesions over the dorsal aspect of the index finger MCP joint bilaterally I think are most consistent with knuckle pads, which is a thickening of tissue over the knuckle in the skin.  I do not think these are clearly musculoskeletal related, given overall clinical appearance; also, findings are more mobile within the skin rather than mobile with digit motion.  It is possible there is some cystic component to it, but I think these are more likely solid.  Reviewed options with monitoring (primarily more so if asymptomatic), additional imaging (considerations include ultrasound, to evaluate for cystic structure, possibly MRI), potential for aspiration if cystic structure confirmed, and referral on for further treatment for presumed knuckle pads.  Given these are symptomatic, would plan to have you see another provider.  With already established relationship with dermatology, plan for you to see them next.  Follow-up here is open ended. Contact clinic if any questions/concerns.    If you have any further questions for your physician or physician s care team you can call [phone] and use option 3 to leave a voice message. Calls received during business hours will be returned same day.

## 2022-07-27 ENCOUNTER — MYC MEDICAL ADVICE (OUTPATIENT)
Dept: INTERNAL MEDICINE | Facility: CLINIC | Age: 72
End: 2022-07-27

## 2022-07-27 DIAGNOSIS — E11.9 TYPE 2 DIABETES, HBA1C GOAL < 8% (H): Primary | ICD-10-CM

## 2022-08-03 ENCOUNTER — MYC MEDICAL ADVICE (OUTPATIENT)
Dept: FAMILY MEDICINE | Facility: CLINIC | Age: 72
End: 2022-08-03

## 2022-08-03 DIAGNOSIS — Z78.9 PATIENT TRAVELS: Primary | ICD-10-CM

## 2022-08-12 ENCOUNTER — LAB (OUTPATIENT)
Dept: LAB | Facility: CLINIC | Age: 72
End: 2022-08-12
Attending: FAMILY MEDICINE
Payer: COMMERCIAL

## 2022-08-12 DIAGNOSIS — Z78.9 PATIENT TRAVELS: ICD-10-CM

## 2022-08-12 LAB — SARS-COV-2 RNA RESP QL NAA+PROBE: NEGATIVE

## 2022-08-12 PROCEDURE — U0005 INFEC AGEN DETEC AMPLI PROBE: HCPCS

## 2022-08-12 PROCEDURE — U0003 INFECTIOUS AGENT DETECTION BY NUCLEIC ACID (DNA OR RNA); SEVERE ACUTE RESPIRATORY SYNDROME CORONAVIRUS 2 (SARS-COV-2) (CORONAVIRUS DISEASE [COVID-19]), AMPLIFIED PROBE TECHNIQUE, MAKING USE OF HIGH THROUGHPUT TECHNOLOGIES AS DESCRIBED BY CMS-2020-01-R: HCPCS

## 2022-08-29 ENCOUNTER — TELEPHONE (OUTPATIENT)
Dept: INTERNAL MEDICINE | Facility: CLINIC | Age: 72
End: 2022-08-29

## 2022-08-29 DIAGNOSIS — F41.9 ANXIETY: Primary | ICD-10-CM

## 2022-08-29 RX ORDER — LORAZEPAM 0.5 MG/1
0.5 TABLET ORAL EVERY 6 HOURS PRN
Qty: 60 TABLET | Refills: 0 | Status: SHIPPED | OUTPATIENT
Start: 2022-08-29 | End: 2023-10-12

## 2022-08-29 NOTE — TELEPHONE ENCOUNTER
Central Prior Authorization Team   Phone: [phone]    PA Initiation    Medication: LORazepam (ATIVAN) 0.5 MG tablet  Insurance Company: [employer] [place] - Phone [phone] Fax [phone]  Pharmacy Filling the Rx: [id number] [place] - [address] [place] [address] AT [address]  Filling Pharmacy Phone: [phone]  Filling Pharmacy Fax:    Start Date: 8/29/2022

## 2022-08-31 NOTE — TELEPHONE ENCOUNTER
Prior Authorization Approval    Authorization Effective Date: 5/31/2022  Authorization Expiration Date: 8/29/2023  Medication: LORazepam (ATIVAN) 0.5 MG tablet  Approved Dose/Quantity:    Reference #:     Insurance Company: [employer] [place] - Phone [phone] Fax [phone]  Expected CoPay:       CoPay Card Available:      Foundation Assistance Needed:    Which Pharmacy is filling the prescription (Not needed for infusion/clinic administered): [id number] [place] - [address] [place] [address]  Pharmacy Notified: Yes  Patient Notified: Yes

## 2022-09-22 ENCOUNTER — TRANSFERRED RECORDS (OUTPATIENT)
Dept: HEALTH INFORMATION MANAGEMENT | Facility: CLINIC | Age: 72
End: 2022-09-22

## 2022-10-06 ENCOUNTER — TRANSFERRED RECORDS (OUTPATIENT)
Dept: HEALTH INFORMATION MANAGEMENT | Facility: CLINIC | Age: 72
End: 2022-10-06

## 2022-10-07 ENCOUNTER — HOSPITAL ENCOUNTER (OUTPATIENT)
Facility: CLINIC | Age: 72
End: 2022-10-07
Attending: UROLOGY | Admitting: UROLOGY
Payer: COMMERCIAL

## 2022-10-07 DIAGNOSIS — E11.9 TYPE 2 DIABETES, HBA1C GOAL < 8% (H): ICD-10-CM

## 2022-10-08 NOTE — TELEPHONE ENCOUNTER
"Routing refill request to provider for review/approval because:  Labs not current:  GFR,CR, K,     Last Written Prescription Date:  9/16/2021  Last Fill Quantity: 90,  # refills: 3   Last office visit provider:  6/6/2022 with Ermias     Requested Prescriptions   Pending Prescriptions Disp Refills     empagliflozin (JARDIANCE) 25 MG TABS tablet 90 tablet 3     Sig: Take 1 tablet (25 mg) by mouth daily       Sodium Glucose Co-Transport Inhibitor Agents Failed - 10/8/2022  2:57 PM        Failed - No creatinine >1.4 or GFR <45 within the past 12 mos     Recent Labs   Lab Test 09/16/21  1324 09/17/20  0733   GFRESTIMATED 70 >60   GFRESTBLACK  --  >60       Recent Labs   Lab Test 09/16/21  1324   CR 1.06             Failed - Patient has documented normal Potassium within the last 12 mos.     Recent Labs   Lab Test 09/16/21  1324   POTASSIUM 3.6             Passed - Patient has documented A1c within the specified period of time.     If HgbA1C is 8 or greater, it needs to be on file within the past 3 months.  If less than 8, must be on file within the past 6 months.     Recent Labs   Lab Test 06/06/22  1601   A1C 7.3*             Passed - Medication is active on med list        Passed - Patient is age 18 or older        Passed - Recent (6 mo) or future (30 days) visit within the authorizing provider's specialty     Patient had office visit in the last 6 months or has a visit in the next 30 days with authorizing provider or within the authorizing provider's specialty.  See \"Patient Info\" tab in inbasket, or \"Choose Columns\" in Meds & Orders section of the refill encounter.                 Carol Davis RN 10/08/22 2:57 PM  "

## 2022-10-10 ENCOUNTER — OFFICE VISIT (OUTPATIENT)
Dept: INTERNAL MEDICINE | Facility: CLINIC | Age: 72
End: 2022-10-10
Payer: COMMERCIAL

## 2022-10-10 ENCOUNTER — HEALTH MAINTENANCE LETTER (OUTPATIENT)
Age: 72
End: 2022-10-10

## 2022-10-10 VITALS
WEIGHT: 227.5 LBS | SYSTOLIC BLOOD PRESSURE: 118 MMHG | HEART RATE: 79 BPM | HEIGHT: 72 IN | DIASTOLIC BLOOD PRESSURE: 71 MMHG | BODY MASS INDEX: 30.81 KG/M2 | TEMPERATURE: 98.3 F | RESPIRATION RATE: 11 BRPM | OXYGEN SATURATION: 97 %

## 2022-10-10 DIAGNOSIS — N40.1 BENIGN PROSTATIC HYPERPLASIA WITH URINARY HESITANCY: Chronic | ICD-10-CM

## 2022-10-10 DIAGNOSIS — I25.83 CORONARY ARTERY DISEASE DUE TO LIPID RICH PLAQUE: Chronic | ICD-10-CM

## 2022-10-10 DIAGNOSIS — E11.42 DIABETIC POLYNEUROPATHY ASSOCIATED WITH TYPE 2 DIABETES MELLITUS (H): ICD-10-CM

## 2022-10-10 DIAGNOSIS — I10 ESSENTIAL HYPERTENSION: ICD-10-CM

## 2022-10-10 DIAGNOSIS — I25.10 CORONARY ARTERY DISEASE DUE TO LIPID RICH PLAQUE: Chronic | ICD-10-CM

## 2022-10-10 DIAGNOSIS — G47.33 OBSTRUCTIVE SLEEP APNEA SYNDROME: Chronic | ICD-10-CM

## 2022-10-10 DIAGNOSIS — E66.01 MORBID (SEVERE) OBESITY DUE TO EXCESS CALORIES (H): Chronic | ICD-10-CM

## 2022-10-10 DIAGNOSIS — Z79.899 CHRONIC PRESCRIPTION BENZODIAZEPINE USE: ICD-10-CM

## 2022-10-10 DIAGNOSIS — Z23 ENCOUNTER FOR IMMUNIZATION: ICD-10-CM

## 2022-10-10 DIAGNOSIS — R39.11 BENIGN PROSTATIC HYPERPLASIA WITH URINARY HESITANCY: Chronic | ICD-10-CM

## 2022-10-10 DIAGNOSIS — E78.5 HYPERLIPIDEMIA LDL GOAL <70: ICD-10-CM

## 2022-10-10 DIAGNOSIS — Z00.00 ENCOUNTER FOR MEDICARE ANNUAL WELLNESS EXAM: Primary | ICD-10-CM

## 2022-10-10 DIAGNOSIS — E11.9 TYPE 2 DIABETES, HBA1C GOAL < 8% (H): ICD-10-CM

## 2022-10-10 PROBLEM — R97.20 RAISED PROSTATE SPECIFIC ANTIGEN: Status: RESOLVED | Noted: 2019-12-12 | Resolved: 2022-10-10

## 2022-10-10 PROBLEM — R39.198 SLOWING OF URINARY STREAM: Status: RESOLVED | Noted: 2019-10-25 | Resolved: 2022-10-10

## 2022-10-10 PROBLEM — R39.15 URINARY URGENCY: Status: RESOLVED | Noted: 2021-01-15 | Resolved: 2022-10-10

## 2022-10-10 LAB
ALBUMIN SERPL BCG-MCNC: 4.4 G/DL (ref 3.5–5.2)
ALP SERPL-CCNC: 48 U/L (ref 40–129)
ALT SERPL W P-5'-P-CCNC: 22 U/L (ref 10–50)
ANION GAP SERPL CALCULATED.3IONS-SCNC: 16 MMOL/L (ref 7–15)
AST SERPL W P-5'-P-CCNC: 18 U/L (ref 10–50)
BILIRUB SERPL-MCNC: 0.4 MG/DL
BUN SERPL-MCNC: 21.6 MG/DL (ref 8–23)
CALCIUM SERPL-MCNC: 10.3 MG/DL (ref 8.8–10.2)
CHLORIDE SERPL-SCNC: 100 MMOL/L (ref 98–107)
CHOLEST SERPL-MCNC: 140 MG/DL
CREAT SERPL-MCNC: 0.91 MG/DL (ref 0.67–1.17)
DEPRECATED HCO3 PLAS-SCNC: 23 MMOL/L (ref 22–29)
ERYTHROCYTE [DISTWIDTH] IN BLOOD BY AUTOMATED COUNT: 13.4 % (ref 10–15)
GFR SERPL CREATININE-BSD FRML MDRD: 90 ML/MIN/1.73M2
GLUCOSE SERPL-MCNC: 148 MG/DL (ref 70–99)
HBA1C MFR BLD: 7.9 % (ref 0–5.6)
HCT VFR BLD AUTO: 44.6 % (ref 40–53)
HDLC SERPL-MCNC: 36 MG/DL
HGB BLD-MCNC: 14.3 G/DL (ref 13.3–17.7)
LDLC SERPL CALC-MCNC: 75 MG/DL
MCH RBC QN AUTO: 28.1 PG (ref 26.5–33)
MCHC RBC AUTO-ENTMCNC: 32.1 G/DL (ref 31.5–36.5)
MCV RBC AUTO: 88 FL (ref 78–100)
NONHDLC SERPL-MCNC: 104 MG/DL
PLATELET # BLD AUTO: 328 10E3/UL (ref 150–450)
POTASSIUM SERPL-SCNC: 4.1 MMOL/L (ref 3.4–5.3)
PROT SERPL-MCNC: 7.3 G/DL (ref 6.4–8.3)
RBC # BLD AUTO: 5.08 10E6/UL (ref 4.4–5.9)
SODIUM SERPL-SCNC: 139 MMOL/L (ref 136–145)
TRIGL SERPL-MCNC: 143 MG/DL
WBC # BLD AUTO: 8.4 10E3/UL (ref 4–11)

## 2022-10-10 PROCEDURE — 80061 LIPID PANEL: CPT | Performed by: INTERNAL MEDICINE

## 2022-10-10 PROCEDURE — 83036 HEMOGLOBIN GLYCOSYLATED A1C: CPT | Performed by: INTERNAL MEDICINE

## 2022-10-10 PROCEDURE — 36415 COLL VENOUS BLD VENIPUNCTURE: CPT | Performed by: INTERNAL MEDICINE

## 2022-10-10 PROCEDURE — 85027 COMPLETE CBC AUTOMATED: CPT | Performed by: INTERNAL MEDICINE

## 2022-10-10 PROCEDURE — G0439 PPPS, SUBSEQ VISIT: HCPCS | Performed by: INTERNAL MEDICINE

## 2022-10-10 PROCEDURE — 80053 COMPREHEN METABOLIC PANEL: CPT | Performed by: INTERNAL MEDICINE

## 2022-10-10 PROCEDURE — 99214 OFFICE O/P EST MOD 30 MIN: CPT | Mod: 25 | Performed by: INTERNAL MEDICINE

## 2022-10-10 PROCEDURE — 91312 COVID-19,PF,PFIZER BOOSTER BIVALENT: CPT | Performed by: INTERNAL MEDICINE

## 2022-10-10 PROCEDURE — 0124A COVID-19,PF,PFIZER BOOSTER BIVALENT: CPT | Performed by: INTERNAL MEDICINE

## 2022-10-10 RX ORDER — LORAZEPAM 1 MG/1
1 TABLET ORAL DAILY PRN
Qty: 60 TABLET | Refills: 0 | Status: SHIPPED | OUTPATIENT
Start: 2022-10-10 | End: 2023-10-12

## 2022-10-10 RX ORDER — METFORMIN HCL 500 MG
1000 TABLET, EXTENDED RELEASE 24 HR ORAL
Qty: 180 TABLET | Refills: 3 | Status: SHIPPED | OUTPATIENT
Start: 2022-10-10 | End: 2023-10-12

## 2022-10-10 RX ORDER — GABAPENTIN 100 MG/1
300 CAPSULE ORAL
Qty: 270 CAPSULE | Refills: 3 | Status: SHIPPED | OUTPATIENT
Start: 2022-10-10 | End: 2023-10-12

## 2022-10-10 ASSESSMENT — ENCOUNTER SYMPTOMS
HEARTBURN: 0
COUGH: 0
DYSURIA: 0
HEADACHES: 0
DIZZINESS: 0
EYE PAIN: 1
PALPITATIONS: 1
JOINT SWELLING: 0
ARTHRALGIAS: 1
NERVOUS/ANXIOUS: 0
DIARRHEA: 0
PARESTHESIAS: 0
NAUSEA: 0
CHILLS: 0
SORE THROAT: 0
MYALGIAS: 1
FREQUENCY: 1
HEMATOCHEZIA: 0
ABDOMINAL PAIN: 0
SHORTNESS OF BREATH: 1
CONSTIPATION: 0
WEAKNESS: 1
HEMATURIA: 0
FEVER: 0

## 2022-10-10 ASSESSMENT — ASTHMA QUESTIONNAIRES
QUESTION_3 LAST FOUR WEEKS HOW OFTEN DID YOUR ASTHMA SYMPTOMS (WHEEZING, COUGHING, SHORTNESS OF BREATH, CHEST TIGHTNESS OR PAIN) WAKE YOU UP AT NIGHT OR EARLIER THAN USUAL IN THE MORNING: ONCE A WEEK
QUESTION_2 LAST FOUR WEEKS HOW OFTEN HAVE YOU HAD SHORTNESS OF BREATH: MORE THAN ONCE A DAY
ACT_TOTALSCORE: 12
ACT_TOTALSCORE: 12
QUESTION_5 LAST FOUR WEEKS HOW WOULD YOU RATE YOUR ASTHMA CONTROL: SOMEWHAT CONTROLLED
QUESTION_1 LAST FOUR WEEKS HOW MUCH OF THE TIME DID YOUR ASTHMA KEEP YOU FROM GETTING AS MUCH DONE AT WORK, SCHOOL OR AT HOME: SOME OF THE TIME
QUESTION_4 LAST FOUR WEEKS HOW OFTEN HAVE YOU USED YOUR RESCUE INHALER OR NEBULIZER MEDICATION (SUCH AS ALBUTEROL): ONE OR TWO TIMES PER DAY

## 2022-10-10 ASSESSMENT — ACTIVITIES OF DAILY LIVING (ADL): CURRENT_FUNCTION: NO ASSISTANCE NEEDED

## 2022-10-10 NOTE — PROGRESS NOTES
"SUBJECTIVE:   Nadir is a 72 year old who presents for Preventive Visit.    HPI:  He has been well.  Plans TUR later in the year.  No further urosepsis.  No chest pain, but does not significant increase in CATHERINE without chest pain. Sleeps well, no unusual cough.     Patient has been advised of split billing requirements and indicates understanding: Yes  Are you in the first 12 months of your Medicare coverage?  No    Healthy Habits:     In general, how would you rate your overall health?  Good    Frequency of exercise:  2-3 days/week    Duration of exercise:  Less than 15 minutes    Do you usually eat at least 4 servings of fruit and vegetables a day, include whole grains    & fiber and avoid regularly eating high fat or \"junk\" foods?  No    Taking medications regularly:  Yes    Medication side effects:  None    Ability to successfully perform activities of daily living:  No assistance needed    Home Safety:  No safety concerns identified    Hearing Impairment:  Need to ask people to speak up or repeat themselves    In the past 6 months, have you been bothered by leaking of urine? Yes    In general, how would you rate your overall mental or emotional health?  Excellent      PHQ-2 Total Score: 0    Additional concerns today:  No    Do you feel safe in your environment? Yes    Have you ever done Advance Care Planning? (For example, a Health Directive, POLST, or a discussion with a medical provider or your loved ones about your wishes): Yes, advance care planning is on file.     Cognitive Screening  cognitive function is intact today.     Do you have sleep apnea, excessive snoring or daytime drowsiness?: yes    Reviewed and updated as needed this visit by clinical staff   Tobacco  Allergies  Meds            Reviewed and updated as needed this visit by Provider     Social History     Tobacco Use     Smoking status: Never     Smokeless tobacco: Never   Substance Use Topics     Alcohol use: No     Alcohol Use 10/10/2022 " "  Prescreen: >3 drinks/day or >7 drinks/week? Not Applicable     Current providers sharing in care for this patient include:     Patient Care Team:  Adolfo Kraus MD as PCP - General (Internal Medicine)  Tonny Anna MD as Assigned Heart and Vascular Provider  Malachi Flores DO as Assigned Musculoskeletal Provider  Boy Pickett MD as Assigned Pulmonology Provider    The following health maintenance items are reviewed in Epic and correct as of today:  Health Maintenance   Topic Date Due     DIABETIC FOOT EXAM  Never done     ASTHMA ACTION PLAN  Never done     AORTIC ANEURYSM SCREENING (SYSTEM ASSIGNED)  01/20/2015     COVID-19 Vaccine (5 - Booster for Moderna series) 08/26/2022     BMP  09/16/2022     MICROALBUMIN  09/16/2022     ANNUAL REVIEW OF HM ORDERS  09/16/2022     MEDICARE ANNUAL WELLNESS VISIT  09/16/2022     ZOSTER IMMUNIZATION (3 of 3) 11/22/2022     A1C  12/06/2022     LIPID  12/17/2022     EYE EXAM  12/22/2022     ASTHMA CONTROL TEST  04/10/2023     FALL RISK ASSESSMENT  10/10/2023     ADVANCE CARE PLANNING  06/27/2024     DTAP/TDAP/TD IMMUNIZATION (2 - Td or Tdap) 12/04/2024     COLORECTAL CANCER SCREENING  06/01/2031     HEPATITIS C SCREENING  Completed     PHQ-2 (once per calendar year)  Completed     INFLUENZA VACCINE  Completed     Pneumococcal Vaccine: 65+ Years  Completed     IPV IMMUNIZATION  Aged Out     MENINGITIS IMMUNIZATION  Aged Out     ROS: see HPI    OBJECTIVE:     PHYSICAL EXAM:  General Appearance: In no acute distress  /71 (BP Location: Left arm, Patient Position: Sitting, Cuff Size: Adult Large)   Pulse 79   Temp 98.3  F (36.8  C) (Oral)   Resp 11   Ht 1.83 m (6' 0.05\")   Wt 103.2 kg (227 lb 8 oz)   SpO2 97%   BMI 30.81 kg/m    NECK:  without cervical or axillary adenopathy  RESPIRATORY: Clear   CARDIOVASCULAR: S1, S2  ABDOMEN: soft, obese, and non-tender  EXTREMITIES: No joint swelling, no ulcer or edema    ASSESSMENT / PLAN:     Nadir" "was seen today for wellness visit.    Diagnoses and all orders for this visit:    Encounter for Medicare annual wellness exam    Essential hypertension  -     Comprehensive metabolic panel  -     CBC with platelets    Obstructive sleep apnea syndrome    Morbid (severe) obesity due to excess calories     Hyperlipidemia LDL goal <70  -     Lipid panel reflex to direct LDL Fasting    Type 2 diabetes, HbA1C goal < 8%   He will consider adding ozempic or trulicity. Wants to study his coverage first.   -     Albumin Random Urine Quantitative with Creat Ratio;   -     metFORMIN (GLUCOPHAGE XR) 500 MG 24 hr tablet; Take 2 tablets (1,000 mg) by mouth daily (with dinner)  -     Hemoglobin A1c;     Chronic prescription benzodiazepine use  -     LORazepam (ATIVAN) 1 MG tablet; Take 1 tablet (1 mg) by mouth daily as needed for anxiety    Benign prostatic hyperplasia with urinary hesitancy  Will be having TUR in the future    Coronary artery disease due to lipid rich plaque  Stress test last year, negative. Will repeat now to r/o angina equivalent and to assess for TUR surgery.   -     NM MPI Treadmill; Future    Diabetic polyneuropathy associated with type 2 diabetes mellitus   Wants to try gabapentin again at HS.    -     gabapentin (NEURONTIN) 100 MG capsule; Take 3 capsules (300 mg) by mouth nightly as needed for neuropathic pain    Encounter for immunization  -     COVID-19,PF,PFIZER BOOSTER BIVALENT 12+Yrs    COUNSELING:  Reviewed preventive health counseling, as reflected in patient instructions       Regular exercise       Healthy diet/nutrition       Colon cancer screening       Prostate cancer screening    Estimated body mass index is 29.82 kg/m  as calculated from the following:    Height as of 7/15/22: 1.854 m (6' 1\").    Weight as of 7/15/22: 102.5 kg (226 lb).    Weight management plan: Discussed healthy diet and exercise guidelines    He reports that he has never smoked. He has never used smokeless " tobacco.    Appropriate preventive services were discussed with this patient, including applicable screening as appropriate for cardiovascular disease, diabetes, osteopenia/osteoporosis, and glaucoma.  As appropriate for age/gender, discussed screening for colorectal cancer, prostate cancer, breast cancer, and cervical cancer. Checklist reviewing preventive services available has been given to the patient.    Reviewed patients plan of care and provided an AVS. The Basic Care Plan (routine screening as documented in Health Maintenance) for Nadir meets the Care Plan requirement. This Care Plan has been established and reviewed with the Patient.    Adolfo Kraus MD  [facility]    Identified Health Risks: obesity, history of CAD and type 2 DM

## 2022-10-10 NOTE — PATIENT INSTRUCTIONS
Patient Education   Personalized Prevention Plan  You are due for the preventive services outlined below.  Your care team is available to assist you in scheduling these services.  If you have already completed any of these items, please share that information with your care team to update in your medical record.  Health Maintenance Due   Topic Date Due     Diabetic Foot Exam  Never done     Asthma Action Plan - yearly  Never done     AORTIC ANEURYSM SCREENING (SYSTEM ASSIGNED)  01/20/2015     COVID-19 Vaccine (5 - Booster for Moderna series) 08/26/2022     Basic Metabolic Panel  09/16/2022     Kidney Microalbumin Urine Test  09/16/2022     ANNUAL REVIEW OF HM ORDERS  09/16/2022     Annual Wellness Visit  09/16/2022       Understanding USDA MyPlate  The USDA has guidelines to help you make healthy food choices. These are called MyPlate. MyPlate shows the food groups that make up healthy meals using the image of a place setting. Before you eat, think about the healthiest choices for what to put on your plate or in your cup or bowl. To learn more about building a healthy plate, visit www.choosemyplate.gov.    The food groups    Fruits. Any fruit or 100% fruit juice counts as part of the Fruit Group. Fruits may be fresh, canned, frozen, or dried, and may be whole, cut-up, or pureed. Make 1/2 of your plate fruits and vegetables.    Vegetables. Any vegetable or 100% vegetable juice counts as a member of the Vegetable Group. Vegetables may be fresh, frozen, canned, or dried. They can be served raw or cooked and may be whole, cut-up, or mashed. Make 1/2 of your plate fruits and vegetables.    Grains. All foods made from grains are part of the Grains Group. These include wheat, rice, oats, cornmeal, and barley. Grains are often used to make foods such as bread, pasta, oatmeal, cereal, tortillas, and grits. Grains should be no more than 1/4 of your plate. At least half of your grains should be whole grains.    Protein. This  group includes meat, poultry, seafood, beans and peas, eggs, processed soy products (such as tofu), nuts (including nut butters), and seeds. Make protein choices no more than 1/4 of your plate. Meat and poultry choices should be lean or low fat.    Dairy. The Dairy Group includes all fluid milk products and foods made from milk that contain calcium, such as yogurt and cheese. (Foods that have little calcium, such as cream, butter, and cream cheese, are not part of this group.) Most dairy choices should be low-fat or fat-free.    Oils. Oils aren't a food group, but they do contain essential nutrients. However it's important to watch your intake of oils. These are fats that are liquid at room temperature. They include canola, corn, olive, soybean, vegetable, and sunflower oil. Foods that are mainly oil include mayonnaise, certain salad dressings, and soft margarines. You likely already get your daily oil allowance from the foods you eat.  Things to limit  Eating healthy also means limiting these things in your diet:       Salt (sodium). Many processed foods have a lot of sodium. To keep sodium intake down, eat fresh vegetables, meats, poultry, and seafood when possible. Purchase low-sodium, reduced-sodium, or no-salt-added food products at the store. And don't add salt to your meals at home. Instead, season them with herbs and spices such as dill, oregano, cumin, and paprika. Or try adding flavor with lemon or lime zest and juice.    Saturated fat. Saturated fats are most often found in animal products such as beef, pork, and chicken. They are often solid at room temperature, such as butter. To reduce your saturated fat intake, choose leaner cuts of meat and poultry. And try healthier cooking methods such as grilling, broiling, roasting, or baking. For a simple lower-fat swap, use plain nonfat yogurt instead of mayonnaise when making potato salad or macaroni salad.    Added sugars. These are sugars added to foods.  They are in foods such as ice cream, candy, soda, fruit drinks, sports drinks, energy drinks, cookies, pastries, jams, and syrups. Cut down on added sugars by sharing sweet treats with a family member or friend. You can also choose fruit for dessert, and drink water or other unsweetened beverages.     [facility] last reviewed this educational content on 6/1/2020 2000-2021 [employer]. All rights reserved. This information is not intended as a substitute for professional medical care. Always follow your healthcare professional's instructions.          Signs of Hearing Loss      Hearing much better with one ear can be a sign of hearing loss.   Hearing loss is a problem shared by many people. In fact, it is one of the most common health problems, particularly as people age. Most people age 65 and older have some hearing loss. By age 80, almost everyone does. Hearing loss often occurs slowly over the years. So you may not realize your hearing has gotten worse.  Have your hearing checked  Call your healthcare provider if you:    Have to strain to hear normal conversation    Have to watch other people s faces very carefully to follow what they re saying    Need to ask people to repeat what they ve said    Often misunderstand what people are saying    Turn the volume of the television or radio up so high that others complain    Feel that people are mumbling when they re talking to you    Find that the effort to hear leaves you feeling tired and irritated    Notice, when using the phone, that you hear better with one ear than the other  [facility] last reviewed this educational content on 1/1/2020 2000-2021 [employer]. All rights reserved. This information is not intended as a substitute for professional medical care. Always follow your healthcare professional's instructions.          Urinary Incontinence (Male)    Urinary incontinence means not being able to control the release of urine from  the bladder.   Causes  Common causes of urinary incontinence in men include:    Infection    Certain medicines    Aging    Poor pelvic muscle tone    Bladder spasms    Obesity    Trouble urinating and fully emptying the bladder (urinary retention)  Other things that can cause incontinence are:     Nervous system diseases    Diabetes    Sleep apnea    Urinary tract infections    Prostate surgery    Pelvic injury  Constipation and smoking have also been identified as risk factors.   Symptoms    Urge incontinence (overactive bladder). This is a sudden urge to urinate. It occurs even though there may not be much urine in the bladder. The need to urinate often during the night is common. It's due to bladder spasms.    Stress incontinence. This is urine leakage that you can't control. It can occur with sneezing, coughing, and other actions that put stress on the bladder.    Treatment  Treatment depends on what is causing the condition. Bladder infections are treated with antibiotics. Urinary retention is treated with a bladder catheter.   Home care  Follow these guidelines when caring for yourself at home:    Don't have any foods and drinks that may irritate the bladder. This includes:  ? Chocolate  ? Alcohol  ? Caffeine  ? Carbonated drinks  ? Acidic fruits and juices    Limit fluids to 6 to 8 cups a day.    Lose weight if you are overweight. This will reduce your symptoms.    If advised, do regular pelvic muscle-strengthening exercises such as Kegel exercises.    If needed, wear absorbent pads to catch urine. Change the pads often. This is for good hygiene and to prevent skin and bladder infections.    Bathe daily for good hygiene.    If an antibiotic was prescribed to treat a bladder infection, take it until it's finished. Keep taking it even if you are feeling better. This is to make sure your infection has cleared.    If a catheter was left in place, keep bacteria from getting into the collection bag. Don't  disconnect the catheter from the collection bag.    Use a leg band to secure the catheter drainage tube, so it does not pull on the catheter. Drain the collection bag when it becomes full. To do this, use the drain spout at the bottom of the bag. Don't disconnect the bag from the catheter.    Don't pull on or try to remove a catheter. The catheter must be removed by a healthcare provider.    If you smoke, stop. Ask your provider for help if you can't do this on your own.  Follow-up care  Follow up with your healthcare provider, or as advised.  When to get medical advice  Call your healthcare provider right away if any of these occur:    Fever over 100.4 F (38 C), or as directed by your provider    Bladder pain or fullness    Belly swelling, nausea, or vomiting    Back pain    Weakness, dizziness, or fainting    If a catheter was left in place, return if:  ? The catheter falls out  ? The catheter stops draining for 6 hours  ? Your urine gets cloudy or smells bad  [employer] last reviewed this educational content on 1/1/2020 2000-2021 [employer]. All rights reserved. This information is not intended as a substitute for professional medical care. Always follow your healthcare professional's instructions.

## 2022-10-12 ENCOUNTER — LAB (OUTPATIENT)
Dept: LAB | Facility: CLINIC | Age: 72
End: 2022-10-12
Payer: COMMERCIAL

## 2022-10-12 DIAGNOSIS — E11.9 TYPE 2 DIABETES, HBA1C GOAL < 8% (H): ICD-10-CM

## 2022-10-12 LAB
CREAT UR-MCNC: 55.6 MG/DL
MICROALBUMIN UR-MCNC: <12 MG/L
MICROALBUMIN/CREAT UR: NORMAL MG/G{CREAT}

## 2022-10-12 PROCEDURE — 82043 UR ALBUMIN QUANTITATIVE: CPT

## 2022-10-13 ENCOUNTER — HOSPITAL ENCOUNTER (OUTPATIENT)
Dept: CARDIOLOGY | Facility: HOSPITAL | Age: 72
Discharge: HOME | End: 2022-10-13
Attending: INTERNAL MEDICINE
Payer: COMMERCIAL

## 2022-10-13 ENCOUNTER — HOSPITAL ENCOUNTER (OUTPATIENT)
Dept: NUCLEAR MEDICINE | Facility: HOSPITAL | Age: 72
Discharge: HOME | End: 2022-10-13
Attending: INTERNAL MEDICINE
Payer: COMMERCIAL

## 2022-10-13 DIAGNOSIS — I25.10 CORONARY ARTERY DISEASE DUE TO LIPID RICH PLAQUE: Chronic | ICD-10-CM

## 2022-10-13 DIAGNOSIS — I25.83 CORONARY ARTERY DISEASE DUE TO LIPID RICH PLAQUE: Chronic | ICD-10-CM

## 2022-10-13 LAB
CV STRESS CURRENT BP HE: NORMAL
CV STRESS CURRENT HR HE: 100
CV STRESS CURRENT HR HE: 100
CV STRESS CURRENT HR HE: 104
CV STRESS CURRENT HR HE: 106
CV STRESS CURRENT HR HE: 107
CV STRESS CURRENT HR HE: 109
CV STRESS CURRENT HR HE: 110
CV STRESS CURRENT HR HE: 115
CV STRESS CURRENT HR HE: 124
CV STRESS CURRENT HR HE: 124
CV STRESS CURRENT HR HE: 127
CV STRESS CURRENT HR HE: 127
CV STRESS CURRENT HR HE: 128
CV STRESS CURRENT HR HE: 128
CV STRESS CURRENT HR HE: 129
CV STRESS CURRENT HR HE: 135
CV STRESS CURRENT HR HE: 136
CV STRESS CURRENT HR HE: 136
CV STRESS CURRENT HR HE: 97
CV STRESS CURRENT HR HE: 97
CV STRESS CURRENT HR HE: 99
CV STRESS CURRENT HR HE: 99
CV STRESS DEVIATION TIME HE: NORMAL
CV STRESS ECHO PERCENT HR HE: NORMAL
CV STRESS EXERCISE STAGE HE: NORMAL
CV STRESS EXERCISE STAGE REACHED HE: NORMAL
CV STRESS FINAL RESTING BP HE: NORMAL
CV STRESS FINAL RESTING HR HE: 100
CV STRESS MAX HR HE: 136
CV STRESS MAX TREADMILL GRADE HE: 12
CV STRESS MAX TREADMILL SPEED HE: 2.5
CV STRESS PEAK DIA BP HE: NORMAL
CV STRESS PEAK SYS BP HE: NORMAL
CV STRESS PHASE HE: NORMAL
CV STRESS PROTOCOL HE: NORMAL
CV STRESS RESTING PT POSITION HE: NORMAL
CV STRESS RESTING PT POSITION HE: NORMAL
CV STRESS ST DEVIATION AMOUNT HE: NORMAL
CV STRESS ST DEVIATION ELEVATION HE: NORMAL
CV STRESS ST EVELATION AMOUNT HE: NORMAL
CV STRESS TEST TYPE HE: NORMAL
CV STRESS TOTAL STAGE TIME MIN 1 HE: NORMAL
RATE PRESSURE PRODUCT: NORMAL
STRESS ECHO BASELINE DIASTOLIC HE: 63
STRESS ECHO BASELINE HR: 108
STRESS ECHO BASELINE SYSTOLIC BP: 129
STRESS ECHO CALCULATED PERCENT HR: 92 %
STRESS ECHO LAST STRESS DIASTOLIC BP: 68
STRESS ECHO LAST STRESS HR: 135
STRESS ECHO LAST STRESS SYSTOLIC BP: 150
STRESS ECHO POST ESTIMATED WORKLOAD: 5.4
STRESS ECHO POST EXERCISE DUR MIN: 3
STRESS ECHO POST EXERCISE DUR SEC: 40
STRESS ECHO TARGET HR: 148

## 2022-10-13 PROCEDURE — 343N000001 HC RX 343: Performed by: INTERNAL MEDICINE

## 2022-10-13 PROCEDURE — 78452 HT MUSCLE IMAGE SPECT MULT: CPT | Mod: 26 | Performed by: INTERNAL MEDICINE

## 2022-10-13 PROCEDURE — 93018 CV STRESS TEST I&R ONLY: CPT | Performed by: INTERNAL MEDICINE

## 2022-10-13 PROCEDURE — A9500 TC99M SESTAMIBI: HCPCS | Performed by: INTERNAL MEDICINE

## 2022-10-13 PROCEDURE — 93016 CV STRESS TEST SUPVJ ONLY: CPT | Performed by: INTERNAL MEDICINE

## 2022-10-13 PROCEDURE — 93017 CV STRESS TEST TRACING ONLY: CPT

## 2022-10-13 PROCEDURE — 78452 HT MUSCLE IMAGE SPECT MULT: CPT

## 2022-10-13 RX ADMIN — Medication 8.1 MCI.: at 11:59

## 2022-10-13 RX ADMIN — Medication 30.3 MILLICURIE: at 13:01

## 2022-10-15 DIAGNOSIS — E11.9 TYPE 2 DIABETES, HBA1C GOAL < 8% (H): Primary | ICD-10-CM

## 2022-10-15 RX ORDER — DULAGLUTIDE 0.75 MG/.5ML
0.75 INJECTION, SOLUTION SUBCUTANEOUS
Qty: 3 ML | Refills: 3 | Status: SHIPPED | OUTPATIENT
Start: 2022-10-15 | End: 2023-02-27

## 2022-11-07 DIAGNOSIS — E78.5 DYSLIPIDEMIA, GOAL LDL BELOW 70: ICD-10-CM

## 2022-11-09 RX ORDER — ATORVASTATIN CALCIUM 80 MG/1
80 TABLET, FILM COATED ORAL DAILY
Qty: 90 TABLET | Refills: 2 | OUTPATIENT
Start: 2022-11-09

## 2022-11-09 NOTE — TELEPHONE ENCOUNTER
"Last Written Prescription Date:  1/20/22  Last Fill Quantity: 90,  # refills: 2   Last office visit provider:   10/10/22    Requested Prescriptions   Pending Prescriptions Disp Refills     atorvastatin (LIPITOR) 80 MG tablet 90 tablet 2     Sig: Take 1 tablet (80 mg) by mouth daily       Statins Protocol Passed - 11/7/2022  2:08 PM        Passed - LDL on file in past 12 months     Recent Labs   Lab Test 10/10/22  1132   LDL 75             Passed - No abnormal creatine kinase in past 12 months     No lab results found.             Passed - Recent (12 mo) or future (30 days) visit within the authorizing provider's specialty     Patient has had an office visit with the authorizing provider or a provider within the authorizing providers department within the previous 12 mos or has a future within next 30 days. See \"Patient Info\" tab in inbasket, or \"Choose Columns\" in Meds & Orders section of the refill encounter.              Passed - Medication is active on med list        Passed - Patient is age 18 or older             Catrina Damian RN 11/09/22 2:49 PM  "

## 2022-11-17 ENCOUNTER — OFFICE VISIT (OUTPATIENT)
Dept: INTERNAL MEDICINE | Facility: CLINIC | Age: 72
End: 2022-11-17
Payer: COMMERCIAL

## 2022-11-17 VITALS
BODY MASS INDEX: 31.88 KG/M2 | HEART RATE: 97 BPM | HEIGHT: 71 IN | TEMPERATURE: 97.7 F | SYSTOLIC BLOOD PRESSURE: 133 MMHG | WEIGHT: 227.7 LBS | OXYGEN SATURATION: 95 % | DIASTOLIC BLOOD PRESSURE: 79 MMHG

## 2022-11-17 DIAGNOSIS — E11.9 TYPE 2 DIABETES, HBA1C GOAL < 8% (H): Primary | ICD-10-CM

## 2022-11-17 DIAGNOSIS — N40.1 BENIGN PROSTATIC HYPERPLASIA WITH WEAK URINARY STREAM: Chronic | ICD-10-CM

## 2022-11-17 DIAGNOSIS — I10 ESSENTIAL HYPERTENSION: Chronic | ICD-10-CM

## 2022-11-17 DIAGNOSIS — I25.83 CORONARY ARTERY DISEASE DUE TO LIPID RICH PLAQUE: Chronic | ICD-10-CM

## 2022-11-17 DIAGNOSIS — I25.10 CORONARY ARTERY DISEASE DUE TO LIPID RICH PLAQUE: Chronic | ICD-10-CM

## 2022-11-17 DIAGNOSIS — Z01.818 PREOPERATIVE EXAMINATION: ICD-10-CM

## 2022-11-17 DIAGNOSIS — R39.12 BENIGN PROSTATIC HYPERPLASIA WITH WEAK URINARY STREAM: Chronic | ICD-10-CM

## 2022-11-17 DIAGNOSIS — G47.33 OBSTRUCTIVE SLEEP APNEA SYNDROME: Chronic | ICD-10-CM

## 2022-11-17 LAB
ANION GAP SERPL CALCULATED.3IONS-SCNC: 17 MMOL/L (ref 7–15)
ATRIAL RATE - MUSE: 97 BPM
BUN SERPL-MCNC: 24 MG/DL (ref 8–23)
CALCIUM SERPL-MCNC: 9.8 MG/DL (ref 8.8–10.2)
CHLORIDE SERPL-SCNC: 100 MMOL/L (ref 98–107)
CREAT SERPL-MCNC: 1 MG/DL (ref 0.67–1.17)
DEPRECATED HCO3 PLAS-SCNC: 22 MMOL/L (ref 22–29)
DIASTOLIC BLOOD PRESSURE - MUSE: NORMAL MMHG
ERYTHROCYTE [DISTWIDTH] IN BLOOD BY AUTOMATED COUNT: 13.8 % (ref 10–15)
GFR SERPL CREATININE-BSD FRML MDRD: 80 ML/MIN/1.73M2
GLUCOSE SERPL-MCNC: 201 MG/DL (ref 70–99)
HCT VFR BLD AUTO: 43.9 % (ref 40–53)
HGB BLD-MCNC: 14.2 G/DL (ref 13.3–17.7)
INTERPRETATION ECG - MUSE: NORMAL
MCH RBC QN AUTO: 28 PG (ref 26.5–33)
MCHC RBC AUTO-ENTMCNC: 32.3 G/DL (ref 31.5–36.5)
MCV RBC AUTO: 86 FL (ref 78–100)
P AXIS - MUSE: 39 DEGREES
PLATELET # BLD AUTO: 289 10E3/UL (ref 150–450)
POTASSIUM SERPL-SCNC: 3.1 MMOL/L (ref 3.4–5.3)
PR INTERVAL - MUSE: 172 MS
QRS DURATION - MUSE: 88 MS
QT - MUSE: 360 MS
QTC - MUSE: 457 MS
R AXIS - MUSE: 19 DEGREES
RBC # BLD AUTO: 5.08 10E6/UL (ref 4.4–5.9)
SODIUM SERPL-SCNC: 139 MMOL/L (ref 136–145)
SYSTOLIC BLOOD PRESSURE - MUSE: NORMAL MMHG
T AXIS - MUSE: 32 DEGREES
VENTRICULAR RATE- MUSE: 97 BPM
WBC # BLD AUTO: 14.6 10E3/UL (ref 4–11)

## 2022-11-17 PROCEDURE — 36415 COLL VENOUS BLD VENIPUNCTURE: CPT | Performed by: INTERNAL MEDICINE

## 2022-11-17 PROCEDURE — 93005 ELECTROCARDIOGRAM TRACING: CPT | Performed by: INTERNAL MEDICINE

## 2022-11-17 PROCEDURE — 80048 BASIC METABOLIC PNL TOTAL CA: CPT | Performed by: INTERNAL MEDICINE

## 2022-11-17 PROCEDURE — 85027 COMPLETE CBC AUTOMATED: CPT | Performed by: INTERNAL MEDICINE

## 2022-11-17 PROCEDURE — 99215 OFFICE O/P EST HI 40 MIN: CPT | Performed by: INTERNAL MEDICINE

## 2022-11-17 PROCEDURE — 93010 ELECTROCARDIOGRAM REPORT: CPT | Performed by: INTERNAL MEDICINE

## 2022-11-17 ASSESSMENT — ASTHMA QUESTIONNAIRES
QUESTION_1 LAST FOUR WEEKS HOW MUCH OF THE TIME DID YOUR ASTHMA KEEP YOU FROM GETTING AS MUCH DONE AT WORK, SCHOOL OR AT HOME: NONE OF THE TIME
QUESTION_5 LAST FOUR WEEKS HOW WOULD YOU RATE YOUR ASTHMA CONTROL: WELL CONTROLLED
ACT_TOTALSCORE: 24
QUESTION_4 LAST FOUR WEEKS HOW OFTEN HAVE YOU USED YOUR RESCUE INHALER OR NEBULIZER MEDICATION (SUCH AS ALBUTEROL): NOT AT ALL
ACT_TOTALSCORE: 24
QUESTION_3 LAST FOUR WEEKS HOW OFTEN DID YOUR ASTHMA SYMPTOMS (WHEEZING, COUGHING, SHORTNESS OF BREATH, CHEST TIGHTNESS OR PAIN) WAKE YOU UP AT NIGHT OR EARLIER THAN USUAL IN THE MORNING: NOT AT ALL
QUESTION_2 LAST FOUR WEEKS HOW OFTEN HAVE YOU HAD SHORTNESS OF BREATH: NOT AT ALL
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
## 2022-11-18 ENCOUNTER — TELEPHONE (OUTPATIENT)
Dept: INTERNAL MEDICINE | Facility: CLINIC | Age: 72
End: 2022-11-18

## 2022-11-18 DIAGNOSIS — E87.6 LOW BLOOD POTASSIUM: Primary | ICD-10-CM

## 2022-11-18 RX ORDER — POTASSIUM CHLORIDE 1500 MG/1
20 TABLET, EXTENDED RELEASE ORAL 2 TIMES DAILY
Qty: 8 TABLET | Refills: 0 | Status: SHIPPED | OUTPATIENT
Start: 2022-11-18 | End: 2022-11-22

## 2022-11-18 NOTE — TELEPHONE ENCOUNTER
Extra beats or premature ventricular contractions can happen in light of low potassium level and should get better after he completes his potassium.    He did have recent stress testing done which did not show any ischemia or infarction, so inferior infarct that was mentioned on EKG is less likely.    He can touch base again with  Routinely next week after he has repeat potassium levels.

## 2022-11-18 NOTE — TELEPHONE ENCOUNTER
Please let pt know:    Dr. Kraus is out of the office today.  Blood work done yesterday showed low potassium level.  I am sending prescription for potassium for you to take twice a day for the next 4 days and then repeat potassium levels later next week.    Hydrochlorothiazide blood pressure medication can cause low potassium levels.  Please discuss with Dr. Soler possible need for long-term potassium supplementation.

## 2022-11-18 NOTE — TELEPHONE ENCOUNTER
Spoke with patient and relayed information below from Dr Jane. Patient verbalized understanding and states he will make follow up appointments with Dr Kraus and redraw the labs as indicated.     Patient is also concerned about the read on his recent EKG. He is wondering if anything needs to be done about it. Writer encouraged him to reach out to his cardiologist, but will also have Dr Jane weigh in as well. Patient verbalized understanding and states he will reach out to cardiology.

## 2022-11-21 RX ORDER — CEFAZOLIN SODIUM/WATER 2 G/20 ML
2 SYRINGE (ML) INTRAVENOUS
Status: CANCELLED | OUTPATIENT
Start: 2022-11-21

## 2022-11-21 RX ORDER — CEFAZOLIN SODIUM/WATER 2 G/20 ML
2 SYRINGE (ML) INTRAVENOUS SEE ADMIN INSTRUCTIONS
Status: CANCELLED | OUTPATIENT
Start: 2022-11-21

## 2022-11-21 NOTE — TELEPHONE ENCOUNTER
Spoke with patient and relayed information below from Dr Jane. Patient verbalized understanding and has no further questions.

## 2022-11-23 ENCOUNTER — TELEPHONE (OUTPATIENT)
Dept: INTERNAL MEDICINE | Facility: CLINIC | Age: 72
End: 2022-11-23

## 2022-11-23 RX ORDER — LOSARTAN POTASSIUM 25 MG/1
25 TABLET ORAL DAILY
Qty: 90 TABLET | Refills: 3 | Status: SHIPPED | OUTPATIENT
Start: 2022-11-23 | End: 2023-10-12

## 2022-11-23 NOTE — TELEPHONE ENCOUNTER
He has completed the potassium replacement.  He has decided to delay having the surgery.  We will start low dose losartan for potassium sparing, and he will follow up in 3 weeks.  He is not having chest pain and I suspect the EKG abnormalities were related to hypokalemia.  Dr. Nayana MD

## 2022-12-06 ENCOUNTER — LAB (OUTPATIENT)
Dept: LAB | Facility: CLINIC | Age: 72
End: 2022-12-06
Payer: COMMERCIAL

## 2022-12-06 DIAGNOSIS — E87.6 LOW BLOOD POTASSIUM: ICD-10-CM

## 2022-12-06 LAB
ANION GAP SERPL CALCULATED.3IONS-SCNC: 8 MMOL/L (ref 3–14)
BUN SERPL-MCNC: 19 MG/DL (ref 7–30)
CALCIUM SERPL-MCNC: 9.2 MG/DL (ref 8.5–10.1)
CHLORIDE BLD-SCNC: 105 MMOL/L (ref 94–109)
CO2 SERPL-SCNC: 25 MMOL/L (ref 20–32)
CREAT SERPL-MCNC: 0.86 MG/DL (ref 0.66–1.25)
GFR SERPL CREATININE-BSD FRML MDRD: >90 ML/MIN/1.73M2
GLUCOSE BLD-MCNC: 196 MG/DL (ref 70–99)
MAGNESIUM SERPL-MCNC: 2.3 MG/DL (ref 1.6–2.3)
POTASSIUM BLD-SCNC: 3.9 MMOL/L (ref 3.4–5.3)
SODIUM SERPL-SCNC: 138 MMOL/L (ref 133–144)

## 2022-12-06 PROCEDURE — 83735 ASSAY OF MAGNESIUM: CPT

## 2022-12-06 PROCEDURE — 36415 COLL VENOUS BLD VENIPUNCTURE: CPT

## 2022-12-06 PROCEDURE — 80048 BASIC METABOLIC PNL TOTAL CA: CPT

## 2022-12-07 DIAGNOSIS — E78.5 DYSLIPIDEMIA, GOAL LDL BELOW 70: ICD-10-CM

## 2022-12-08 RX ORDER — ATORVASTATIN CALCIUM 80 MG/1
80 TABLET, FILM COATED ORAL DAILY
Qty: 90 TABLET | Refills: 3 | Status: SHIPPED | OUTPATIENT
Start: 2022-12-08 | End: 2023-10-12

## 2022-12-08 NOTE — TELEPHONE ENCOUNTER
"Last Written Prescription Date:  1/20/22  Last Fill Quantity: 90,  # refills: 2   Last office visit provider:  11/17/22     Requested Prescriptions   Pending Prescriptions Disp Refills     atorvastatin (LIPITOR) 80 MG tablet 90 tablet 2     Sig: Take 1 tablet (80 mg) by mouth daily       [facility] Protocol Passed - 12/8/2022  9:40 AM        Passed - LDL on file in past 12 months     Recent Labs   Lab Test 10/10/22  1132   LDL 75             Passed - No abnormal creatine kinase in past 12 months     No lab results found.             Passed - Recent (12 mo) or future (30 days) visit within the authorizing provider's specialty     Patient has had an office visit with the authorizing provider or a provider within the authorizing providers department within the previous 12 mos or has a future within next 30 days. See \"Patient Info\" tab in inbasket, or \"Choose Columns\" in Meds & Orders section of the refill encounter.              Passed - Medication is active on med list        Passed - Patient is age 18 or older             Clifford Collazo RN 12/08/22 9:40 AM  "

## 2022-12-27 ENCOUNTER — TRANSFERRED RECORDS (OUTPATIENT)
Dept: HEALTH INFORMATION MANAGEMENT | Facility: CLINIC | Age: 72
End: 2022-12-27

## 2022-12-27 LAB — RETINOPATHY: NEGATIVE

## 2023-01-02 ENCOUNTER — OFFICE VISIT (OUTPATIENT)
Dept: INTERNAL MEDICINE | Facility: CLINIC | Age: 73
End: 2023-01-02
Payer: COMMERCIAL

## 2023-01-02 VITALS
TEMPERATURE: 97.7 F | BODY MASS INDEX: 33.17 KG/M2 | HEART RATE: 101 BPM | WEIGHT: 236.9 LBS | HEIGHT: 71 IN | OXYGEN SATURATION: 96 % | SYSTOLIC BLOOD PRESSURE: 111 MMHG | DIASTOLIC BLOOD PRESSURE: 72 MMHG | RESPIRATION RATE: 16 BRPM

## 2023-01-02 DIAGNOSIS — E11.9 TYPE 2 DIABETES, HBA1C GOAL < 8% (H): Primary | ICD-10-CM

## 2023-01-02 DIAGNOSIS — Z01.818 PREOPERATIVE EXAMINATION: ICD-10-CM

## 2023-01-02 DIAGNOSIS — T82.855D STENOSIS OF CORONARY STENT, SUBSEQUENT ENCOUNTER: ICD-10-CM

## 2023-01-02 LAB
ANION GAP SERPL CALCULATED.3IONS-SCNC: 16 MMOL/L (ref 7–15)
ATRIAL RATE - MUSE: 98 BPM
BUN SERPL-MCNC: 18 MG/DL (ref 8–23)
CALCIUM SERPL-MCNC: 10.1 MG/DL (ref 8.8–10.2)
CHLORIDE SERPL-SCNC: 100 MMOL/L (ref 98–107)
CREAT SERPL-MCNC: 0.95 MG/DL (ref 0.67–1.17)
DEPRECATED HCO3 PLAS-SCNC: 21 MMOL/L (ref 22–29)
DIASTOLIC BLOOD PRESSURE - MUSE: NORMAL MMHG
ERYTHROCYTE [DISTWIDTH] IN BLOOD BY AUTOMATED COUNT: 13.9 % (ref 10–15)
GFR SERPL CREATININE-BSD FRML MDRD: 85 ML/MIN/1.73M2
GLUCOSE SERPL-MCNC: 160 MG/DL (ref 70–99)
HBA1C MFR BLD: 7.4 % (ref 0–5.6)
HCT VFR BLD AUTO: 45.5 % (ref 40–53)
HGB BLD-MCNC: 14.7 G/DL (ref 13.3–17.7)
INTERPRETATION ECG - MUSE: NORMAL
MCH RBC QN AUTO: 27.9 PG (ref 26.5–33)
MCHC RBC AUTO-ENTMCNC: 32.3 G/DL (ref 31.5–36.5)
MCV RBC AUTO: 87 FL (ref 78–100)
P AXIS - MUSE: 31 DEGREES
PLATELET # BLD AUTO: 322 10E3/UL (ref 150–450)
POTASSIUM SERPL-SCNC: 4.3 MMOL/L (ref 3.4–5.3)
PR INTERVAL - MUSE: 178 MS
QRS DURATION - MUSE: 82 MS
QT - MUSE: 350 MS
QTC - MUSE: 446 MS
R AXIS - MUSE: 41 DEGREES
RBC # BLD AUTO: 5.26 10E6/UL (ref 4.4–5.9)
SODIUM SERPL-SCNC: 137 MMOL/L (ref 136–145)
SYSTOLIC BLOOD PRESSURE - MUSE: NORMAL MMHG
T AXIS - MUSE: 30 DEGREES
VENTRICULAR RATE- MUSE: 98 BPM
WBC # BLD AUTO: 9.4 10E3/UL (ref 4–11)

## 2023-01-02 PROCEDURE — 83036 HEMOGLOBIN GLYCOSYLATED A1C: CPT | Performed by: INTERNAL MEDICINE

## 2023-01-02 PROCEDURE — 36415 COLL VENOUS BLD VENIPUNCTURE: CPT | Performed by: INTERNAL MEDICINE

## 2023-01-02 PROCEDURE — 99214 OFFICE O/P EST MOD 30 MIN: CPT | Performed by: INTERNAL MEDICINE

## 2023-01-02 PROCEDURE — 85027 COMPLETE CBC AUTOMATED: CPT | Performed by: INTERNAL MEDICINE

## 2023-01-02 PROCEDURE — 93010 ELECTROCARDIOGRAM REPORT: CPT | Performed by: GENERAL ACUTE CARE HOSPITAL

## 2023-01-02 PROCEDURE — 80048 BASIC METABOLIC PNL TOTAL CA: CPT | Performed by: INTERNAL MEDICINE

## 2023-01-02 PROCEDURE — 93005 ELECTROCARDIOGRAM TRACING: CPT | Performed by: INTERNAL MEDICINE
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
## 2023-01-16 ENCOUNTER — ANESTHESIA EVENT (OUTPATIENT)
Dept: SURGERY | Facility: HOSPITAL | Age: 73
End: 2023-01-16
Payer: COMMERCIAL

## 2023-01-17 ENCOUNTER — ANESTHESIA (OUTPATIENT)
Dept: SURGERY | Facility: HOSPITAL | Age: 73
End: 2023-01-17
Payer: COMMERCIAL

## 2023-01-17 ENCOUNTER — HOSPITAL ENCOUNTER (OUTPATIENT)
Facility: HOSPITAL | Age: 73
Discharge: HOME | End: 2023-01-17
Attending: UROLOGY | Admitting: UROLOGY
Payer: COMMERCIAL

## 2023-01-17 VITALS
TEMPERATURE: 97.9 F | SYSTOLIC BLOOD PRESSURE: 111 MMHG | OXYGEN SATURATION: 94 % | WEIGHT: 226.8 LBS | BODY MASS INDEX: 31.63 KG/M2 | DIASTOLIC BLOOD PRESSURE: 63 MMHG | RESPIRATION RATE: 16 BRPM | HEART RATE: 64 BPM

## 2023-01-17 DIAGNOSIS — R39.12 BENIGN PROSTATIC HYPERPLASIA WITH WEAK URINARY STREAM: Primary | Chronic | ICD-10-CM

## 2023-01-17 DIAGNOSIS — N40.1 BENIGN PROSTATIC HYPERPLASIA WITH WEAK URINARY STREAM: Primary | Chronic | ICD-10-CM

## 2023-01-17 LAB
GLUCOSE BLDC GLUCOMTR-MCNC: 130 MG/DL (ref 70–99)
GLUCOSE BLDC GLUCOMTR-MCNC: 142 MG/DL (ref 70–99)

## 2023-01-17 PROCEDURE — 272N000001 HC OR GENERAL SUPPLY STERILE: Performed by: UROLOGY

## 2023-01-17 PROCEDURE — L8699 PROSTHETIC IMPLANT NOS: HCPCS | Performed by: UROLOGY

## 2023-01-17 PROCEDURE — 250N000011 HC RX IP 250 OP 636: Performed by: ANESTHESIOLOGY

## 2023-01-17 PROCEDURE — 258N000003 HC RX IP 258 OP 636: Performed by: ANESTHESIOLOGY

## 2023-01-17 PROCEDURE — 258N000003 HC RX IP 258 OP 636

## 2023-01-17 PROCEDURE — 250N000013 HC RX MED GY IP 250 OP 250 PS 637: Performed by: UROLOGY

## 2023-01-17 PROCEDURE — 250N000009 HC RX 250: Performed by: UROLOGY

## 2023-01-17 PROCEDURE — 370N000017 HC ANESTHESIA TECHNICAL FEE, PER MIN: Performed by: UROLOGY

## 2023-01-17 PROCEDURE — 710N000009 HC RECOVERY PHASE 1, LEVEL 1, PER MIN: Performed by: UROLOGY

## 2023-01-17 PROCEDURE — 250N000011 HC RX IP 250 OP 636

## 2023-01-17 PROCEDURE — 360N000075 HC SURGERY LEVEL 2, PER MIN: Performed by: UROLOGY

## 2023-01-17 PROCEDURE — 710N000012 HC RECOVERY PHASE 2, PER MINUTE: Performed by: UROLOGY

## 2023-01-17 PROCEDURE — 250N000009 HC RX 250

## 2023-01-17 PROCEDURE — 250N000011 HC RX IP 250 OP 636: Performed by: STUDENT IN AN ORGANIZED HEALTH CARE EDUCATION/TRAINING PROGRAM

## 2023-01-17 PROCEDURE — 82962 GLUCOSE BLOOD TEST: CPT

## 2023-01-17 PROCEDURE — 999N000141 HC STATISTIC PRE-PROCEDURE NURSING ASSESSMENT: Performed by: UROLOGY

## 2023-01-17 DEVICE — IMPLANTABLE DEVICE: Type: IMPLANTABLE DEVICE | Status: FUNCTIONAL

## 2023-01-17 RX ORDER — CEFAZOLIN SODIUM/WATER 2 G/20 ML
2 SYRINGE (ML) INTRAVENOUS SEE ADMIN INSTRUCTIONS
Status: DISCONTINUED | OUTPATIENT
Start: 2023-01-17 | End: 2023-01-17 | Stop reason: HOSPADM

## 2023-01-17 RX ORDER — ACETAMINOPHEN 325 MG/1
650 TABLET ORAL EVERY 6 HOURS PRN
Status: DISCONTINUED | OUTPATIENT
Start: 2023-01-17 | End: 2023-01-17 | Stop reason: HOSPADM

## 2023-01-17 RX ORDER — ONDANSETRON 2 MG/ML
4 INJECTION INTRAMUSCULAR; INTRAVENOUS EVERY 30 MIN PRN
Status: DISCONTINUED | OUTPATIENT
Start: 2023-01-17 | End: 2023-01-17 | Stop reason: HOSPADM

## 2023-01-17 RX ORDER — FENTANYL CITRATE 50 UG/ML
25 INJECTION, SOLUTION INTRAMUSCULAR; INTRAVENOUS
Status: DISCONTINUED | OUTPATIENT
Start: 2023-01-17 | End: 2023-01-17 | Stop reason: HOSPADM

## 2023-01-17 RX ORDER — HYDROMORPHONE HYDROCHLORIDE 1 MG/ML
0.2 INJECTION, SOLUTION INTRAMUSCULAR; INTRAVENOUS; SUBCUTANEOUS EVERY 5 MIN PRN
Status: DISCONTINUED | OUTPATIENT
Start: 2023-01-17 | End: 2023-01-17 | Stop reason: HOSPADM

## 2023-01-17 RX ORDER — LIDOCAINE 40 MG/G
CREAM TOPICAL
Status: DISCONTINUED | OUTPATIENT
Start: 2023-01-17 | End: 2023-01-17 | Stop reason: HOSPADM

## 2023-01-17 RX ORDER — HYDROCODONE BITARTRATE AND ACETAMINOPHEN 5; 325 MG/1; MG/1
1 TABLET ORAL EVERY 4 HOURS PRN
Status: DISCONTINUED | OUTPATIENT
Start: 2023-01-17 | End: 2023-01-17 | Stop reason: HOSPADM

## 2023-01-17 RX ORDER — GLYCOPYRROLATE 0.2 MG/ML
INJECTION, SOLUTION INTRAMUSCULAR; INTRAVENOUS PRN
Status: DISCONTINUED | OUTPATIENT
Start: 2023-01-17 | End: 2023-01-17

## 2023-01-17 RX ORDER — PROPOFOL 10 MG/ML
INJECTION, EMULSION INTRAVENOUS CONTINUOUS PRN
Status: DISCONTINUED | OUTPATIENT
Start: 2023-01-17 | End: 2023-01-17

## 2023-01-17 RX ORDER — NALOXONE HYDROCHLORIDE 1 MG/ML
0.4 INJECTION INTRAMUSCULAR; INTRAVENOUS; SUBCUTANEOUS
Status: DISCONTINUED | OUTPATIENT
Start: 2023-01-17 | End: 2023-01-17 | Stop reason: HOSPADM

## 2023-01-17 RX ORDER — FENTANYL CITRATE 50 UG/ML
25 INJECTION, SOLUTION INTRAMUSCULAR; INTRAVENOUS EVERY 5 MIN PRN
Status: DISCONTINUED | OUTPATIENT
Start: 2023-01-17 | End: 2023-01-17 | Stop reason: HOSPADM

## 2023-01-17 RX ORDER — FENTANYL CITRATE 50 UG/ML
INJECTION, SOLUTION INTRAMUSCULAR; INTRAVENOUS PRN
Status: DISCONTINUED | OUTPATIENT
Start: 2023-01-17 | End: 2023-01-17

## 2023-01-17 RX ORDER — MEPERIDINE HYDROCHLORIDE 25 MG/ML
12.5 INJECTION INTRAMUSCULAR; INTRAVENOUS; SUBCUTANEOUS
Status: DISCONTINUED | OUTPATIENT
Start: 2023-01-17 | End: 2023-01-17 | Stop reason: HOSPADM

## 2023-01-17 RX ORDER — PROPOFOL 10 MG/ML
INJECTION, EMULSION INTRAVENOUS PRN
Status: DISCONTINUED | OUTPATIENT
Start: 2023-01-17 | End: 2023-01-17

## 2023-01-17 RX ORDER — ONDANSETRON 4 MG/1
4 TABLET, ORALLY DISINTEGRATING ORAL EVERY 30 MIN PRN
Status: DISCONTINUED | OUTPATIENT
Start: 2023-01-17 | End: 2023-01-17 | Stop reason: HOSPADM

## 2023-01-17 RX ORDER — LIDOCAINE HYDROCHLORIDE 10 MG/ML
INJECTION, SOLUTION INFILTRATION; PERINEURAL PRN
Status: DISCONTINUED | OUTPATIENT
Start: 2023-01-17 | End: 2023-01-17

## 2023-01-17 RX ORDER — NALOXONE HYDROCHLORIDE 1 MG/ML
0.2 INJECTION INTRAMUSCULAR; INTRAVENOUS; SUBCUTANEOUS
Status: DISCONTINUED | OUTPATIENT
Start: 2023-01-17 | End: 2023-01-17 | Stop reason: HOSPADM

## 2023-01-17 RX ORDER — FENTANYL CITRATE 50 UG/ML
50 INJECTION, SOLUTION INTRAMUSCULAR; INTRAVENOUS EVERY 5 MIN PRN
Status: DISCONTINUED | OUTPATIENT
Start: 2023-01-17 | End: 2023-01-17 | Stop reason: HOSPADM

## 2023-01-17 RX ORDER — HYDROMORPHONE HYDROCHLORIDE 1 MG/ML
0.4 INJECTION, SOLUTION INTRAMUSCULAR; INTRAVENOUS; SUBCUTANEOUS EVERY 5 MIN PRN
Status: DISCONTINUED | OUTPATIENT
Start: 2023-01-17 | End: 2023-01-17 | Stop reason: HOSPADM

## 2023-01-17 RX ORDER — DEXAMETHASONE SODIUM PHOSPHATE 10 MG/ML
INJECTION, SOLUTION INTRAMUSCULAR; INTRAVENOUS PRN
Status: DISCONTINUED | OUTPATIENT
Start: 2023-01-17 | End: 2023-01-17

## 2023-01-17 RX ORDER — ATROPA BELLADONNA AND OPIUM 16.2; 3 MG/1; MG/1
1 SUPPOSITORY RECTAL EVERY 6 HOURS PRN
Status: DISCONTINUED | OUTPATIENT
Start: 2023-01-17 | End: 2023-01-17 | Stop reason: HOSPADM

## 2023-01-17 RX ORDER — SODIUM CHLORIDE, SODIUM LACTATE, POTASSIUM CHLORIDE, CALCIUM CHLORIDE 600; 310; 30; 20 MG/100ML; MG/100ML; MG/100ML; MG/100ML
INJECTION, SOLUTION INTRAVENOUS CONTINUOUS
Status: DISCONTINUED | OUTPATIENT
Start: 2023-01-17 | End: 2023-01-17 | Stop reason: HOSPADM

## 2023-01-17 RX ORDER — ONDANSETRON 2 MG/ML
INJECTION INTRAMUSCULAR; INTRAVENOUS PRN
Status: DISCONTINUED | OUTPATIENT
Start: 2023-01-17 | End: 2023-01-17

## 2023-01-17 RX ORDER — CEFAZOLIN SODIUM/WATER 2 G/20 ML
2 SYRINGE (ML) INTRAVENOUS
Status: COMPLETED | OUTPATIENT
Start: 2023-01-17 | End: 2023-01-17

## 2023-01-17 RX ORDER — HYDROCODONE BITARTRATE AND ACETAMINOPHEN 5; 325 MG/1; MG/1
1-2 TABLET ORAL EVERY 4 HOURS PRN
Qty: 10 TABLET | Refills: 0 | Status: SHIPPED | OUTPATIENT
Start: 2023-01-17 | End: 2023-10-12

## 2023-01-17 RX ADMIN — PHENYLEPHRINE HYDROCHLORIDE 100 MCG: 10 INJECTION INTRAVENOUS at 09:33

## 2023-01-17 RX ADMIN — FENTANYL CITRATE 25 MCG: 50 INJECTION, SOLUTION INTRAMUSCULAR; INTRAVENOUS at 10:17

## 2023-01-17 RX ADMIN — PROPOFOL 180 MCG/KG/MIN: 10 INJECTION, EMULSION INTRAVENOUS at 09:11

## 2023-01-17 RX ADMIN — PHENYLEPHRINE HYDROCHLORIDE 100 MCG: 10 INJECTION INTRAVENOUS at 09:23

## 2023-01-17 RX ADMIN — LIDOCAINE HYDROCHLORIDE 5 ML: 10 INJECTION, SOLUTION INFILTRATION; PERINEURAL at 09:11

## 2023-01-17 RX ADMIN — FENTANYL CITRATE 25 MCG: 50 INJECTION, SOLUTION INTRAMUSCULAR; INTRAVENOUS at 10:09

## 2023-01-17 RX ADMIN — PROPOFOL 200 MG: 10 INJECTION, EMULSION INTRAVENOUS at 09:11

## 2023-01-17 RX ADMIN — FENTANYL CITRATE 50 MCG: 50 INJECTION, SOLUTION INTRAMUSCULAR; INTRAVENOUS at 09:26

## 2023-01-17 RX ADMIN — ONDANSETRON 4 MG: 2 INJECTION INTRAMUSCULAR; INTRAVENOUS at 09:18

## 2023-01-17 RX ADMIN — PHENYLEPHRINE HYDROCHLORIDE 100 MCG: 10 INJECTION INTRAVENOUS at 09:16

## 2023-01-17 RX ADMIN — Medication 2 G: at 09:05

## 2023-01-17 RX ADMIN — ACETAMINOPHEN 650 MG: 325 TABLET ORAL at 11:23

## 2023-01-17 RX ADMIN — SODIUM CHLORIDE, POTASSIUM CHLORIDE, SODIUM LACTATE AND CALCIUM CHLORIDE: 600; 310; 30; 20 INJECTION, SOLUTION INTRAVENOUS at 08:04

## 2023-01-17 RX ADMIN — GLYCOPYRROLATE 0.2 MG: 0.2 INJECTION, SOLUTION INTRAMUSCULAR; INTRAVENOUS at 09:18

## 2023-01-17 RX ADMIN — FENTANYL CITRATE 50 MCG: 50 INJECTION, SOLUTION INTRAMUSCULAR; INTRAVENOUS at 09:11

## 2023-01-17 RX ADMIN — DEXAMETHASONE SODIUM PHOSPHATE 4 MG: 10 INJECTION, SOLUTION INTRAMUSCULAR; INTRAVENOUS at 09:23

## 2023-01-17 ASSESSMENT — ACTIVITIES OF DAILY LIVING (ADL)
ADLS_ACUITY_SCORE: 33
ADLS_ACUITY_SCORE: 22
ADLS_ACUITY_SCORE: 22

## 2023-01-17 NOTE — ANESTHESIA POSTPROCEDURE EVALUATION
Patient: Nadir Cantu    Procedure: Procedure(s):  CYSTOSCOPY, UROLIFT       Anesthesia Type:  General    Note:  Disposition: Outpatient   Postop Pain Control: Uneventful            Sign Out: Well controlled pain   PONV: No   Neuro/Psych: Uneventful            Sign Out: Acceptable/Baseline neuro status   Airway/Respiratory: Uneventful            Sign Out: Acceptable/Baseline resp. status   CV/Hemodynamics: Uneventful            Sign Out: Acceptable CV status; No obvious hypovolemia; No obvious fluid overload   Other NRE:    DID A NON-ROUTINE EVENT OCCUR?            Last vitals:  Vitals Value Taken Time   /68 01/17/23 1030   Temp 36.8  C (98.2  F) 01/17/23 1015   Pulse 79 01/17/23 1033   Resp 9 01/17/23 1032   SpO2 97 % 01/17/23 1033   Vitals shown include unvalidated device data.    Electronically Signed By: Leobardo Urban MD  January 17, 2023  3:50 PM

## 2023-01-17 NOTE — ANESTHESIA CARE TRANSFER NOTE
Patient: Nadir Cantu    Procedure: Procedure(s):  CYSTOSCOPY, UROLIFT       Diagnosis: Hypertrophy of prostate with urinary obstruction [N40.1, N13.8]  Diagnosis Additional Information: No value filed.    Anesthesia Type:   General     Note:    Oropharynx: oropharynx clear of all foreign objects  Level of Consciousness: drowsy  Oxygen Supplementation: face mask  Level of Supplemental Oxygen (L/min / FiO2): 6  Independent Airway: airway patency satisfactory and stable  Dentition: dentition unchanged  Vital Signs Stable: post-procedure vital signs reviewed and stable  Report to RN Given: handoff report given  Patient transferred to: PACU  Comments: Patient transferred to PACU by CRNA. Report given to PACU RN, all questions answered. Patient hemodynamically stable. CRNA ensured PACU RN was comfortable taking over care.  Handoff Report: Identifed the Patient, Identified the Reponsible Provider, Reviewed the pertinent medical history, Discussed the surgical course, Reviewed Intra-OP anesthesia mangement and issues during anesthesia, Set expectations for post-procedure period and Allowed opportunity for questions and acknowledgement of understanding      Vitals:  Vitals Value Taken Time   /64 01/17/23 0951   Temp 36.7  C (98  F) 01/17/23 0951   Pulse 89 01/17/23 0953   Resp 11 01/17/23 0953   SpO2 99 % 01/17/23 0953   Vitals shown include unvalidated device data.    Electronically Signed By: SOFÍA Ching CRNA  January 17, 2023  9:55 AM

## 2023-01-17 NOTE — OP NOTE
Location: [facility]     Patient Name: Nadir Cantu  Patient : 1950  Patient MRN: [id number]  Patient CSN: [id number]  Patient PCP: Adolfo Kraus    Date of Service: 23     Pre-operative diagnosis: BPH and incomplete emptying and weak stream    Post-operative diagnosis: BPH and incomplete emptying and weak stream    Procedure:  Cystoscopy, Urolift    Surgeon: Dr. Leobardo Choudhury    EBL: 5 mL    Anesthesia: General    Indication: 72 year old male with BPH and incompletely emptying and weak stream who had an Urolift on 2020 (6 implants total). His voiding has been worsening over the last year. Urodynamics didn't show obstruction as he wasn't able to void but he did generate a good detrusor contraction.  We discussed doing a repeat Urolift or doing a TURP.  He elected to proceed with Urolift.  Risks, benefits, and alternatives to treatment were discussed with the patient and the patient elected to proceed.    Findings: His urethra was normal.  His prostate had evidence of prior Urolift. Lateral lobes did not touch and appeared open but I did feel that there were areas I could place further implants.  Implants were placed at the bladder neck and lateral lobes more posteriorly (mid level).  A total of 5 implants were used (2 on the left side, 3 on the right side).    Specimens: None    Procedure:  After written informed consent was obtained the patient was brought into the operating room.  The patient was properly identified prior to the procedure, received preprocedure antibiotics, and had sequential compression devices on the legs.  The patient was then induced under anesthesia.     He was positioned in dorsal lithotomy position and prepped and draped in the usual sterile fashion.     An 18 Fr cystoscope sheath was inserted into the meatus and advanced into the bladder.  I proceeded with placement of the first proximal implant. With the sheath in the bladder, the telescope bridge was  replaced with the implant delivery device and the telescope lens reintroduced into the device. The first treatment site was determined by orienting the delivery device tip in an anterolateral direction in the bladder and slowly moving the device distally within the prostatic fossa approximately 1.5 cm from the bladder neck.  The distal tip of the delivery device was then angled laterally at least 20 degrees at this position so as to compress the lateral lobe and assure proper needle trajectory. The needle was deployed and then retracted, allowing one end of the implant to be delivered to the capsular surface of the prostate. The implant was then tensioned to assure capsular seating and removal of slack monofilament. The device was then angled back toward midline and slowly advanced proximally (typically 3 to 4 mm) until cystoscopic verification of the monofilament being centered in the delivery bay. The urethral end piece was then affixed to the monofilament.  The device was then readvanced into the bladder. The above sequence was repeated at the contralateral location in the 9 to 10 o'clock rotation.  If there was persistent obstruction distally, the above sequence was repeated with further implants delivered and inspected until a satisfactory result was obtained, namely a reshaping of prostate that offers a continuous channel through the anterior aspect of the prostatic fossa. A final cystoscopy was conducted first to inspect the location and state of each implant to assure proper seating and location as well as to assure that the prostatic channel remains patent without irrigation pressure.  Total number of implants used = 5.     An 18 Fr mark catheter was placed with 10 mL of water in the balloon.     At this point, the procedure was completed. He tolerated the procedure well.     Plan: Home. Catheter out at home on 1/18/23.  Follow up on 2/16/23.    Leobardo Choudhury MD  9:52 AM
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
## 2023-01-17 NOTE — INTERVAL H&P NOTE
"I have reviewed the surgical (or preoperative) H&P that is linked to this encounter, and examined the patient. There are no significant changes    Clinical Conditions Present on Arrival:  Clinically Significant Risk Factors Present on Admission                   # Drug Induced Platelet Defect: home medication list includes an antiplatelet medication  # DMII: A1C = 7.4 % (Ref range: 0.0 - 5.6 %) within past 3 months  # Obesity: Estimated body mass index is 31.63 kg/m  as calculated from the following:    Height as of 1/2/23: 1.803 m (5' 11\").    Weight as of this encounter: 102.9 kg (226 lb 12.8 oz).       "

## 2023-01-17 NOTE — ANESTHESIA PROCEDURE NOTES
Airway       Patient location during procedure: OR  Staff -        CRNA: Israel El APRN CRNA       Performed By: CRNA  Consent for Airway        Urgency: elective  Indications and Patient Condition       Indications for airway management: deana-procedural       Induction type:intravenous       Mask difficulty assessment: 0 - not attempted    Final Airway Details       Final airway type: supraglottic airway    Supraglottic Airway Details        Type: LMA       Brand: Ambu AuraGain       LMA size: 5    Post intubation assessment        Placement verified by: capnometry, equal breath sounds and chest rise        Number of attempts at approach: 1 (tongue depressor used)       Number of other approaches attempted: 0       Ease of procedure: easy       Dentition: Intact and Unchanged

## 2023-02-02 ENCOUNTER — MYC MEDICAL ADVICE (OUTPATIENT)
Dept: INTERNAL MEDICINE | Facility: CLINIC | Age: 73
End: 2023-02-02
Payer: COMMERCIAL

## 2023-02-02 DIAGNOSIS — I10 ESSENTIAL HYPERTENSION: ICD-10-CM

## 2023-02-03 RX ORDER — CHLORTHALIDONE 25 MG/1
12.5 TABLET ORAL DAILY
Qty: 45 TABLET | Refills: 3 | Status: SHIPPED | OUTPATIENT
Start: 2023-02-03 | End: 2023-10-12

## 2023-02-26 ENCOUNTER — MYC MEDICAL ADVICE (OUTPATIENT)
Dept: INTERNAL MEDICINE | Facility: CLINIC | Age: 73
End: 2023-02-26
Payer: COMMERCIAL

## 2023-02-26 DIAGNOSIS — E11.9 TYPE 2 DIABETES, HBA1C GOAL < 8% (H): ICD-10-CM

## 2023-02-27 RX ORDER — DULAGLUTIDE 0.75 MG/.5ML
0.75 INJECTION, SOLUTION SUBCUTANEOUS
Qty: 3 ML | Refills: 3 | Status: SHIPPED | OUTPATIENT
Start: 2023-02-27 | End: 2023-06-29

## 2023-03-28 ENCOUNTER — MYC MEDICAL ADVICE (OUTPATIENT)
Dept: INTERNAL MEDICINE | Facility: CLINIC | Age: 73
End: 2023-03-28
Payer: COMMERCIAL

## 2023-03-28 DIAGNOSIS — K21.9 GASTROESOPHAGEAL REFLUX DISEASE WITHOUT ESOPHAGITIS: ICD-10-CM

## 2023-03-29 NOTE — TELEPHONE ENCOUNTER
"Routing refill request to provider for review/approval because:  Failed - Not on Clopidogrel (unless Pantoprazole ordered)    Last Written Prescription Date:  12/9/21  Last Fill Quantity: 180,  # refills: 2   Last office visit provider:  1/2/23 Dr Hayes     Requested Prescriptions   Pending Prescriptions Disp Refills     omeprazole (PRILOSEC) 20 MG DR capsule 180 capsule 2     Sig: Take 1 capsule (20 mg) by mouth 2 times daily (before meals)       PPI Protocol Failed - 3/29/2023 10:47 AM        Failed - Not on Clopidogrel (unless Pantoprazole ordered)        Passed - No diagnosis of osteoporosis on record        Passed - Recent (12 mo) or future (30 days) visit within the authorizing provider's specialty     Patient has had an office visit with the authorizing provider or a provider within the authorizing providers department within the previous 12 mos or has a future within next 30 days. See \"Patient Info\" tab in inbasket, or \"Choose Columns\" in Meds & Orders section of the refill encounter.              Passed - Medication is active on med list        Passed - Patient is age 18 or older             James Kasper RN 03/29/23 10:47 AM  "

## 2023-05-03 ENCOUNTER — TELEPHONE (OUTPATIENT)
Dept: INTERNAL MEDICINE | Facility: CLINIC | Age: 73
End: 2023-05-03
Payer: COMMERCIAL

## 2023-05-03 NOTE — TELEPHONE ENCOUNTER
May 3, 2023    [facility] Request to Hold Blood Thinning Medication was received via fax for Dr. Kraus to sign.  Patient label was attached to paperwork and placed in provider's inbox to be signed.    Reba Ellis

## 2023-05-04 NOTE — TELEPHONE ENCOUNTER
May 4, 2023    [facility] Request to Hold Blood Thinning Medication was picked up from outbox of Dr. Kraus.  Paperwork has been reviewed and is complete.  Per initial initial request, this was sent via fax to [phone].     Reba Ellis

## 2023-05-22 DIAGNOSIS — I25.10 CORONARY ARTERY DISEASE INVOLVING NATIVE CORONARY ARTERY OF NATIVE HEART WITHOUT ANGINA PECTORIS: ICD-10-CM

## 2023-05-24 RX ORDER — CLOPIDOGREL BISULFATE 75 MG/1
75 TABLET ORAL DAILY
Qty: 90 TABLET | Refills: 0 | Status: SHIPPED | OUTPATIENT
Start: 2023-05-24 | End: 2023-08-14

## 2023-06-27 ENCOUNTER — TELEPHONE (OUTPATIENT)
Dept: INTERNAL MEDICINE | Facility: CLINIC | Age: 73
End: 2023-06-27
Payer: COMMERCIAL

## 2023-06-27 DIAGNOSIS — K21.9 GASTROESOPHAGEAL REFLUX DISEASE WITHOUT ESOPHAGITIS: Primary | ICD-10-CM

## 2023-06-27 RX ORDER — PANTOPRAZOLE SODIUM 40 MG/1
40 TABLET, DELAYED RELEASE ORAL DAILY
Qty: 90 TABLET | Refills: 3 | Status: SHIPPED | OUTPATIENT
Start: 2023-06-27 | End: 2023-10-12

## 2023-06-27 NOTE — TELEPHONE ENCOUNTER
The patient pharmacy would like for you consider changing to another PPI like pantoprazole due to decreased efficacy of his Plavix.

## 2023-06-27 NOTE — TELEPHONE ENCOUNTER
Not appropriate for refill pool due to pharmacy requesting change in PPI. Routing to care team to address concern.    Hoa Key RN/[place] Nurse Advisor

## 2023-06-29 DIAGNOSIS — E11.9 TYPE 2 DIABETES, HBA1C GOAL < 8% (H): ICD-10-CM

## 2023-06-29 RX ORDER — DULAGLUTIDE 0.75 MG/.5ML
0.75 INJECTION, SOLUTION SUBCUTANEOUS
Qty: 3 ML | Refills: 0 | Status: SHIPPED | OUTPATIENT
Start: 2023-06-29 | End: 2023-07-25

## 2023-06-29 NOTE — TELEPHONE ENCOUNTER
"Last Written Prescription Date:  2/27/23  Last Fill Quantity: 3 mL,  # refills: 3   Last office visit provider:   1/2/23    Requested Prescriptions   Pending Prescriptions Disp Refills     dulaglutide (TRULICITY) 0.75 MG/0.5ML pen 3 mL 3     Sig: Inject 0.75 mg Subcutaneous every 7 days       GLP-1 Agonists Protocol Passed - 6/29/2023  9:53 AM        Passed - HgbA1C in past 3 or 6 months     If HgbA1C is 8 or greater, it needs to be on file within the past 3 months.  If less than 8, must be on file within the past 6 months.     Recent Labs   Lab Test 01/02/23  1212   A1C 7.4*             Passed - Medication is active on med list        Passed - Patient is age 18 or older        Passed - Normal serum creatinine on file in past 12 months     Recent Labs   Lab Test 01/02/23  1212   CR 0.95       Ok to refill medication if creatinine is low          Passed - Recent (6 mo) or future (30 days) visit within the authorizing provider's specialty     Patient had office visit in the last 6 months or has a visit in the next 30 days with authorizing provider.  See \"Patient Info\" tab in inbasket, or \"Choose Columns\" in Meds & Orders section of the refill encounter.                 Catrina Damian RN 06/29/23 4:10 PM  "

## 2023-07-03 ENCOUNTER — E-VISIT (OUTPATIENT)
Dept: URGENT CARE | Facility: CLINIC | Age: 73
End: 2023-07-03
Payer: COMMERCIAL

## 2023-07-03 DIAGNOSIS — R19.7 DIARRHEA, UNSPECIFIED TYPE: Primary | ICD-10-CM

## 2023-07-03 PROCEDURE — 99207 PR NON-BILLABLE SERV PER CHARTING: CPT | Performed by: EMERGENCY MEDICINE

## 2023-07-03 RX ORDER — LOPERAMIDE HYDROCHLORIDE 2 MG/1
2 TABLET ORAL 4 TIMES DAILY PRN
Qty: 20 TABLET | Refills: 3 | Status: SHIPPED | OUTPATIENT
Start: 2023-07-03 | End: 2023-10-12

## 2023-07-03 NOTE — PATIENT INSTRUCTIONS
Diarrhea with Uncertain Cause (Adult)    Diarrhea is when stools are loose and watery. This can be caused by:     Viral infections    Bacterial infections    Food poisoning    Parasites    Irritable bowel syndrome (IBS)    Inflammatory bowel diseases such as ulcerative colitis, Crohn's disease, and celiac disease    Food intolerance, such as to lactose, the sugar found in milk and milk products    Reaction to medicines like antibiotics, laxatives, cancer medicines, and antacids  Along with diarrhea, you may also have:    Abdominal pain and cramping    Nausea and vomiting    Loss of bowel control    Fever and chills    Bloody stools  In some cases, antibiotics may help to treat diarrhea. You may have a stool sample test which is done to see what is causing your diarrhea, and if antibiotics will help treat it. The results of a stool sample test may take up to 2 days. The healthcare provider may not give you antibiotics until they have the stool test results.   Diarrhea can cause dehydration. This is the loss of too much water and other fluids from the body. When this occurs, you must replace those body fluids. This can be done with oral rehydration solutions. Oral rehydration solutions are available at drugstores and grocery stores without a prescription. Sports drinks are not the best choice if you are very dehydrated. They usually have too much sugar and not enough electrolytes.   Home care  Follow all instructions given by your healthcare provider. Rest at home for the next 24 hours, or until you feel better. Avoid caffeine, tobacco, and alcohol. These can make diarrhea, cramping, and pain worse.   If taking medicines:    Over-the-counter nausea and diarrhea medicines are generally OK unless you experience fever or blood in the stool. Check with your healthcare provider first in those circumstances.    You may use acetaminophen or nonsteroidal anti-inflammatory drugs (NSAIDs) such as ibuprofen or naproxen to  reduce pain and fever. Don t use these if you have chronic liver or kidney disease, or ever had a stomach ulcer or gastrointestinal bleeding. Don't use NSAID medicines if you are already taking one for another condition (like arthritis) or are on daily aspirin therapy (such as for heart disease or after a stroke). Talk with your healthcare provider first.    If antibiotics were prescribed, be sure you take them until they are finished. Don t stop taking them even when you feel better. Antibiotics must be taken exactly as prescribed.  To prevent the spread of illness:    Remember that washing with soap and clean, running water and using alcohol-based  is the best way to prevent the spread of infection. Dry your hands with a single-use towel (like a paper towel).    Clean the toilet after each use.    Wash your hands before eating.    Wash your hands before and after preparing food. Keep in mind that people with diarrhea or vomiting should not prepare food for others.    Wash your hands after using cutting boards, counter tops, and knives that have been in contact with raw foods.    Wash and then peel fruits and vegetables.    Keep uncooked meats away from cooked and ready-to-eat foods.    Use a food thermometer when cooking. Cook poultry to at least 165 F (74 C). Cook ground meat (beef, veal, pork, lamb) to at least 160 F (71 C). Cook fresh beef, veal, lamb, and pork to at least 145 F (63 C).    Don t eat raw or undercooked eggs (poached or nicolette side up), poultry, meat, or unpasteurized milk and juices.  Food and drinks  The main goal while treating vomiting or diarrhea is to prevent dehydration. This is done by taking small amounts of liquids often.     Keep in mind that liquids are more important than food right now.    Drink only small amounts of liquids at a time.    Don t force yourself to eat, especially if you are having cramping, vomiting, or diarrhea. Don t eat large amounts at a time, even if you  are hungry.    If you eat, avoid fatty, greasy, spicy, or fried foods.    Don t eat dairy foods or drink milk if you have diarrhea. These can make diarrhea worse.  During the first 24 hours you can try:    Oral rehydration solutions.  Sports drinks may be used if you are not too dehydrated and are otherwise healthy.    Soft drinks without caffeine    Ginger ale    Water (plain or flavored)    Decaf tea or coffee    Clear broth, consommé, or bouillon    Gelatin, ice pops, or frozen fruit juice bars  The second 24 hours, if you are feeling better, you can add:    Hot cereal, plain toast, bread, rolls, or crackers    Plain noodles, rice, mashed potatoes, chicken noodle soup, or rice soup    Applesauce, unsweetened canned fruit (no pineapple)    Bananas  As you recover:    Limit fat intake to less than 15 grams per day. Don t eat margarine, butter, oils, mayonnaise, sauces, gravies, fried foods, peanut butter, meat, poultry, or fish.    Limit fiber. Don t eat raw or cooked vegetables, fresh fruits except bananas, or bran cereals.    Limit caffeine and chocolate.    Limit dairy.    Don t use spices or seasonings except salt.    Go back to your normal diet over time, as you feel better and your symptoms improve.    If the symptoms come back, go back to a simple diet or clear liquids.  Follow-up care  Follow up with your healthcare provider, or as advised. If a stool sample was taken or cultures were done, call the healthcare provider for the results as instructed.   Call 911  Call 911 if you have any of these symptoms:     Trouble breathing    Confusion    Extreme drowsiness or trouble walking    Loss of consciousness    Rapid heart rate    Chest pain    Stiff neck    Seizure  When to get medical advice  Call your healthcare provider right away if any of these occur:     Abdominal pain that gets worse    Constant lower right abdominal pain    Continued vomiting and inability to keep liquids down    Diarrhea more than 5  times a day    Blood in vomit or stool    Dark urine or no urine for 8 hours, dry mouth and tongue, tiredness, weakness, or dizziness    Drowsiness    New rash    You don t get better in 2 to 3 days    Fever of 100.4 F (38 C) or higher, or as advised by your provider  [employer] last reviewed this educational content on 2/1/2022 2000-2022 [employer]. All rights reserved. This information is not intended as a substitute for professional medical care. Always follow your healthcare professional's instructions.          Viral Diarrhea (Adult)    Diarrhea caused by a virus is often called viral gastroenteritis. Many people call it the stomach flu, but it has nothing to do with the flu. The virus that causes diarrhea affects the stomach and intestinal tract. It often lasts from 2 to 7 days. Diarrhea is the passing of loose, watery stools 3 or more times a day.   Symptoms  Along with diarrhea, you may have these symptoms:    Belly (abdominal) pain and cramping    Nausea and vomiting    Loss of bowel control    Fever and chills    Bloody stools  The danger from repeated diarrhea is dehydration. This is when your body loses too much water and other fluids.   Antibiotics don't work well in treating this illness. But there are things you can do at home that will help.   Home care  Follow these home care tips:    If symptoms are severe, rest at home for the next 24 hours or until you are feeling better.    Wash your hands with soap and water or an alcohol-based . This helps prevent the spread of infection. Wash your hands after touching anyone who is sick.    Teach all people in your home when and how to wash their hands Wet your hands with clean, running water. Lather the backs of your hands, between your fingers, and under your nails. Scrub your hands for at least 20 seconds. If you need a timer, try humming the  Happy Birthday  song from beginning to end twice. Rinse your hands well. Dry them with a  clean towel.    Wash your hands after using the toilet and before meals. Clean the toilet after each use.  Food preparation:    People with diarrhea should not make food for others. When making food, wash your hands after touching anyone who is sick.    Wash your hands after using items that have been in contact with raw food. This includes cutting boards, countertops, and knives.    Keep uncooked meats away from cooked and ready-to-eat foods.  Medicines:    You may use acetaminophen or nonsteroidal anti-inflammatory drugs (NSAIDS) such as ibuprofen or naproxen to control fever unless another medicine was prescribed. In addition:  ? Talk with your healthcare provider before using these medicines if you have chronic liver or kidney disease, or ever had a stomach ulcer or GI (gastrointestinal) bleeding.  ? Don t give aspirin (or medicine that contains aspirin) to anyone younger than age 19 unless directed by the provider. Taking aspirin can put them at risk for Reye syndrome. This is a rare but very serious disorder. It most often affects the brain and the liver.  ? Don't use NSAID medicines if you are already taking one for another condition (such as arthritis) or if you are taking aspirin (such as for heart disease or after a stroke).    Anti-diarrhea medicine should be taken for this condition only if advised by your healthcare provider. Sometimes it can make your condition worse. If you have bloody diarrhea or fever, check with your provider before taking this type of medicine.  Diet:    Water and clear liquids are important so you don't get dehydrated. Drink small amounts at a time. Don't guzzle it down. If you are very dehydrated, sports drinks aren't a good choice. They have too much sugar and not enough electrolytes. In this case, commercially available products called oral rehydration solutions are best.    Caffeine, tobacco, and alcohol can make the diarrhea, cramping, and pain worse. Try to stop using these  until you are fully recovered.    Don't force yourself to eat, especially if you have cramping, vomiting, or diarrhea. Don't eat large amounts at a time, even if you are hungry. It may make you feel worse.    If you eat, don't have fatty, greasy, spicy, or fried foods.    Don't have any dairy products, as they can make diarrhea worse.  During the first 24 hours (the first full day) follow the diet below:     Drinks: Water, clear liquids, soft drinks without caffeine; ginger ale, mineral water (plain or flavored), decaffeinated tea and coffee    Soups: Clear broth, consommé, and bouillon    Desserts: Plain gelatin, ice pops, and fruit juice bars  During the next 24 hours (the second day) you may add these to the above if you are feeling better:     Hot cereal, plain toast, bread, rolls, crackers    Plain noodles, rice, mashed potatoes, chicken noodle or rice soup    Unsweetened canned fruit such as applesauce and bananas (not pineapple and citrus)    Limit fat intake to less than 15 grams per day. Don't eat margarine, butter, oils, mayonnaise, sauces, gravies, fried foods, peanut butter, meat, poultry, and fish.    Limit fiber. Don't eat raw or cooked vegetables, fresh fruits (except bananas), and bran cereals.    Limit caffeine and chocolate. No spices or seasonings except salt.  During the next 24 hours:    Slowly go back to a normal diet, as you feel better and your symptoms ease.    If at any time the diarrhea or cramping gets worse, go back to the simpler diet (above) or to clear liquids.  Follow-up care  Follow up with your healthcare provider, or as advised. Call if you aren't getting better in 24 hours or if the diarrhea lasts more than 1 week. This is even more important if you are in a high-risk group, such as:     Being an older adult    Having a weak immune system (such as from cancer treatment)    Having inflammatory bowel disease (Crohn's disease or colitis)    If a stool (diarrhea) sample was taken,  you may call in 2 days (or as directed) for the results.   When to get medical advice  Call your healthcare provider right away if any of these occur:     More belly pain or constant lower right belly pain    Lasting vomiting (can't keep liquids down)    Frequent diarrhea (more than 5 times a day)    Blood in vomit or stool (black or red color)    Eating or drinking less    Dark urine, reduced urine output    Weakness, dizziness    Drowsiness    Fever of 100.4 F (38 C) or higher, or as directed by your provider    New rash    Symptoms get worse or you have new symptoms  Call 911  Call 911 if any of these occur:     Trouble breathing    Feeling confused    Severe drowsiness or trouble waking up    Fainting or loss of consciousness    Fast heart rate    Seizure    Stiff neck  [employer] last reviewed this educational content on 2/1/2021 2000-2022 [employer]. All rights reserved. This information is not intended as a substitute for professional medical care. Always follow your healthcare professional's instructions.          Viral Gastroenteritis (Adult)    Gastroenteritis is often called the stomach flu. But it has nothing to do with influenza. It's most often caused by a virus that affects the stomach and intestinal tract. Most bouts last from 2 to 7 days. Common viruses causing gastroenteritis include norovirus, rotavirus, and hepatitis A. Nonviral causes of gastroenteritis include bacteria, parasites, and toxins.   The danger from repeated vomiting or diarrhea is dehydration. This is when the body loses too much fluid. When this occurs, you must replace the body fluids.   Antibiotics aren't an effective treatment for this condition because it's caused by a virus.   Symptoms of viral gastroenteritis may include:     Watery, loose stools    Stomach pain or belly (abdominal) cramps    Fever and chills    Nausea and vomiting    Loss of bowel control    Headache  Home care  Gastroenteritis is spread by  contact with the stool or vomit of an infected person. This can occur from person to person or from contact with a contaminated surface.   Follow these guidelines when caring for yourself at home:     If symptoms are severe, rest at home for the next 24 hours or until you are feeling better.    Wash your hands with soap and clean, running water or use alcohol-based  to prevent the spread of infection. Wash your hands after touching anyone who is sick.    Wash your hands or use alcohol-based  after using the toilet and before meals. Clean the toilet after each use.  Remember these tips when preparing food:     People with diarrhea should not prepare or serve food to others. When preparing foods, wash your hands before and after.    Wash your hands after using cutting boards, counter tops, knives, or utensils that have been in contact with raw food.    Dry your hands with a single-use disposable towel.    Keep uncooked meats away from cooked and ready-to-eat foods.  Medicine  Use acetaminophen or nonsteroidal anti-inflammatory drugs (NSAID) such as ibuprofen or naproxen to control fever, unless another medicine was given. If you have chronic liver or kidney disease, talk with your healthcare provider before using these medicines. Also talk with your provider if you've had a stomach ulcer or gastrointestinal bleeding. Don't give aspirin to anyone under 18 years of age who is ill with a fever. It may result in a serious illness called Reye syndrome that may cause severe liver damage or even death. Don't use NSAIDS if you're already taking one for another condition (like arthritis) or are on aspirin (such as for heart disease or after a stroke).   If medicines for vomiting or diarrhea are prescribed, take these only as directed. Nausea and diarrhea medicines are generally OK unless you have bleeding, fever, or severe abdominal pain.   Diet  Follow these guidelines for food:     Water and liquids are  important so you don't get dehydrated. Drink small amounts often or suck on ice chips as tolerated if you are vomiting.    If you eat, stay away from fatty, greasy, spicy, or fried foods.    Don't eat dairy if you have diarrhea. This can make diarrhea worse.    Avoid tobacco, alcohol, and caffeine. These may worsen symptoms.  During the first 24 hours (the first full day), follow the diet below:     Beverages. Sip sports drinks, soft drinks without caffeine, ginger ale, mineral water (plain or flavored), decaffeinated tea and coffee. If you are very dehydrated, sports drinks aren't a good choice. They have too much sugar and not enough electrolytes. In this case, use products called oral rehydration solutions. You can buy these at pharmacies and grocery stores.    Soups. Eat clear broth, consommé, and bouillon.    Desserts. Eat gelatin, ice pops, and fruit juice bars.  During the next 24 hours (the second day), you may add the following to the above:     Hot cereal, plain toast, bread, rolls, and crackers    Plain noodles, rice, mashed potatoes, chicken noodle or rice soup    Unsweetened canned fruit (avoid pineapple), bananas    Limit fat intake to less than 15 grams per day. Do this by avoiding margarine, butter, oils, mayonnaise, sauces, gravies, fried foods, peanut butter, meat, poultry, and fish.    Limit fiber and avoid raw or cooked vegetables, fresh fruits (except bananas), and bran cereals.    Limit caffeine and chocolate. Don't use spices or seasonings other than salt.    Limit dairy products.    Avoid alcohol.  During the next 24 hours:    Gradually resume a normal diet as you feel better and your symptoms improve.    If at any time it starts getting worse again, go back to clear liquids until you feel better.  Follow-up care  Follow up with your healthcare provider, or as advised. Call your provider if you don't get better within 24 hours or if diarrhea lasts more than a few days. It's also important to  follow up if you can't keep down liquids, which can lead to becoming dehydrated. If a stool (diarrhea) sample was taken, call as directed for the results.   Call 911  Call 911 if any of these occur:     Trouble breathing    Chest pain    Confused    Severe drowsiness or trouble awakening    Fainting or loss of consciousness    Rapid heart rate    Seizure    Stiff neck  When to get medical advice  Call your healthcare provider right away if any of these occur:     Abdominal pain that gets worse    Continued vomiting (can't keep liquids down)    Frequent diarrhea (more than 5 times a day)    Blood in vomit or stool (black or red color)    Dark urine, reduced urine output, or extreme thirst    Weakness or dizziness    Drowsiness    Fever of 100.4 F (38 C) or higher, or as advised by your provider    New rash  [employer] last reviewed this educational content on 3/1/2022    8152-1904 [employer]. All rights reserved. This information is not intended as a substitute for professional medical care. Always follow your healthcare professional's instructions.        Please be seen in person in 3-4 days if diarrhea continues. BIA Toscano MD

## 2023-07-04 ENCOUNTER — MYC MEDICAL ADVICE (OUTPATIENT)
Dept: INTERNAL MEDICINE | Facility: CLINIC | Age: 73
End: 2023-07-04
Payer: COMMERCIAL

## 2023-07-07 ENCOUNTER — OFFICE VISIT (OUTPATIENT)
Dept: URGENT CARE | Facility: URGENT CARE | Age: 73
End: 2023-07-07
Payer: COMMERCIAL

## 2023-07-07 VITALS
RESPIRATION RATE: 24 BRPM | OXYGEN SATURATION: 96 % | BODY MASS INDEX: 31.52 KG/M2 | SYSTOLIC BLOOD PRESSURE: 98 MMHG | WEIGHT: 226 LBS | HEART RATE: 111 BPM | TEMPERATURE: 97.2 F | DIASTOLIC BLOOD PRESSURE: 73 MMHG

## 2023-07-07 DIAGNOSIS — R53.1 WEAKNESS: ICD-10-CM

## 2023-07-07 DIAGNOSIS — R42 DIZZINESS: ICD-10-CM

## 2023-07-07 DIAGNOSIS — R10.30 LOWER ABDOMINAL PAIN, UNSPECIFIED: ICD-10-CM

## 2023-07-07 DIAGNOSIS — R00.0 TACHYCARDIA: ICD-10-CM

## 2023-07-07 DIAGNOSIS — E11.9 TYPE 2 DIABETES, HBA1C GOAL < 8% (H): ICD-10-CM

## 2023-07-07 DIAGNOSIS — R19.7 DIARRHEA, UNSPECIFIED TYPE: Primary | ICD-10-CM

## 2023-07-07 DIAGNOSIS — E86.0 DEHYDRATION: ICD-10-CM

## 2023-07-07 PROCEDURE — 99214 OFFICE O/P EST MOD 30 MIN: CPT | Performed by: NURSE PRACTITIONER

## 2023-07-07 NOTE — PATIENT INSTRUCTIONS
Go to emergency room for further evaluation of dehydration with diarrhea, dizziness, weakness x 10 days, abdominal pain

## 2023-07-07 NOTE — PROGRESS NOTES
Assessment & Plan     Diarrhea, unspecified type    Lower abdominal pain, unspecified    Dehydration    Tachycardia    Type 2 diabetes, HbA1C goal < 8% (H)    Dizziness    Weakness       Recommend further evaluation in emergency room for persistent diarrhea causing dehydration with tachycardia, soft BP, dizziness, weakness in patient with HTN, diabetes as IV fluids, stat labs indicated- possible electrolyte imbalance. Patient agreeable and declines ambulance. He is discharged in stable condition.       Cara Nation NP  [facility]    Kush Schmitz is a 73 year old male who presents to clinic today for the following health issues:  Chief Complaint   Patient presents with     Urgent Care     Present for for diarrhea, stomach cramping pain with nausea, belching, gassy, weakness and fatigue for 10 days, but worse last night.      Patient presents for evaluation of diarrhea for the past 10 days. Associated symptoms: stomach cramping, nausea, belching, fatigue, dizziness, weakness. Symptoms have been waxing and waning. He did have solid stool yesterday before diarrhea returned and is now watery. Abdominal cramping currently 2/10 and can be severe. He has been able to drink fluids and void, though does not feel like he can keep up with the diarrhea. He has had at least 30 loose stools in past 24 hours and was up all night.     Denies emesis, cough, runny nose, fever, bloody stool, dysuria, urgency, frequency, hematuria. Denies recent hospitalization, travel, antibiotic use.      He completed an Evisit for diarrhea and was treated with loperamide which he has been taking 3.5 times daily which doesn't seem to help.      He has a history of IBS, DM, HTN. Blood sugars have been stable, 135 yesterday morning.       Problem list, Medication list, Allergies, and Medical history reviewed in EPIC.    ROS:  Review of systems negative except for noted above        Objective    BP 98/73 (BP  Location: Right arm, Patient Position: Sitting, Cuff Size: Adult Large)   Pulse 111   Temp 97.2  F (36.2  C) (Tympanic)   Resp 24   Wt 102.5 kg (226 lb)   SpO2 96%   BMI 31.52 kg/m    Physical Exam  Constitutional:       General: He is not in acute distress.     Appearance: He is not toxic-appearing or diaphoretic.   Cardiovascular:      Rate and Rhythm: Regular rhythm. Tachycardia present.      Heart sounds: Normal heart sounds.   Abdominal:      General: Abdomen is protuberant. Bowel sounds are increased.      Tenderness: There is abdominal tenderness in the suprapubic area. There is left CVA tenderness. There is no right CVA tenderness.   Skin:     General: Skin is warm and dry.      Comments: Tenting in skin   Neurological:      Mental Status: He is alert.

## 2023-08-09 ENCOUNTER — TELEPHONE (OUTPATIENT)
Dept: INTERNAL MEDICINE | Facility: CLINIC | Age: 73
End: 2023-08-09
Payer: COMMERCIAL

## 2023-08-09 NOTE — TELEPHONE ENCOUNTER
August 9, 2023    [facility] - Request to Hold Blood Thinning Medication was received via fax for Dr. Kraus to sign.  Patient label was attached to paperwork and placed in provider's inbox to be signed.    Reba Ellis

## 2023-08-11 ENCOUNTER — MYC MEDICAL ADVICE (OUTPATIENT)
Dept: INTERNAL MEDICINE | Facility: CLINIC | Age: 73
End: 2023-08-11

## 2023-08-11 NOTE — TELEPHONE ENCOUNTER
August 11, 2023    Home health orders was picked up from outbox of Dr. Kraus.  Paperwork has been reviewed and is complete.  Per initial initial request, this was sent via fax to [phone].     Joanna Quiles

## 2023-08-14 NOTE — TELEPHONE ENCOUNTER
8/9/23 paperwork regarding request to hold blood tinning medication was completed. Please review this message.

## 2023-08-15 ENCOUNTER — TELEPHONE (OUTPATIENT)
Dept: INTERNAL MEDICINE | Facility: CLINIC | Age: 73
End: 2023-08-15
Payer: COMMERCIAL

## 2023-08-15 NOTE — TELEPHONE ENCOUNTER
"  General Call    Contacts         Type Contact Phone/Fax    08/15/2023 10:08 AM CDT Phone (Incoming) Nadir Cantu (Self) [phone] (H)          Reason for Call:  Spinal Injection    What are your questions or concerns:  Patient called stating that there was a misunderstanding with [facility].  Patient said that he spoke with Dr. Kraus and he was under the understanding that Dr. Kraus would inform [facility] that \"Yes\" it would be ok to hold  his blood thinning medication, however the form that was signed by Dr. Kraus and sent back to [facility] on 8/11, Dr. Kraus circled \"No, this patient may NOT hold their blood thinning medication.  Form is in  filing cabinet.     Patient is requesting a phone call as soon as possible    Date of last appointment with provider: n/a    prefer to receive a phone call?:   Patient would prefer a phone call     Okay to leave a detailed message?: Yes at Home number on file [phone] (home)  "

## 2023-08-16 ENCOUNTER — TELEPHONE (OUTPATIENT)
Dept: INTERNAL MEDICINE | Facility: CLINIC | Age: 73
End: 2023-08-16
Payer: COMMERCIAL

## 2023-08-16 NOTE — TELEPHONE ENCOUNTER
Spoke with [facility] and relayed verbal orders below from Dr Kraus. [facility] verbalized understanding and states they will update the orders.

## 2023-08-16 NOTE — TELEPHONE ENCOUNTER
August 16, 2023    [facility] Blood Thinning Hold Order was received via fax for Dr. Kraus to sign.  Patient label was attached to paperwork and placed in provider's inbox to be signed.    Reba Ellis

## 2023-08-17 NOTE — TELEPHONE ENCOUNTER
August 17, 2023    [facility] Blood Thinning Hold  was picked up from outbox of Dr. Kraus.  Paperwork has been reviewed and is complete.  Per initial initial request, this was sent via fax to [phone].     Reba Ellis

## 2023-08-19 ENCOUNTER — HEALTH MAINTENANCE LETTER (OUTPATIENT)
Age: 73
End: 2023-08-19

## 2023-09-07 DIAGNOSIS — I25.10 CORONARY ARTERY DISEASE INVOLVING NATIVE CORONARY ARTERY OF NATIVE HEART WITHOUT ANGINA PECTORIS: ICD-10-CM

## 2023-09-07 RX ORDER — CLOPIDOGREL BISULFATE 75 MG/1
75 TABLET ORAL DAILY
Qty: 90 TABLET | Refills: 0 | Status: SHIPPED | OUTPATIENT
Start: 2023-09-07 | End: 2023-11-09

## 2023-09-07 NOTE — TELEPHONE ENCOUNTER
Per OV note on 6/2022 with MY continue Clopidogrel indefinitely. Upcoming OV arranged with STEFFANIE for 11/9/23. Refill sent with enough medication til OV. ANTOINERN

## 2023-09-08 ENCOUNTER — TELEPHONE (OUTPATIENT)
Dept: INTERNAL MEDICINE | Facility: CLINIC | Age: 73
End: 2023-09-08
Payer: COMMERCIAL

## 2023-09-08 NOTE — TELEPHONE ENCOUNTER
General Call      Reason for Call:  pt is having Epidural injection in lower back   Needs PCP to release patient  to hold blood thinner for 3 days prior to injection    Please call [facility]    Please advise    What are your questions or concerns:  n/a    Date of last appointment with provider: n/a    Could we send this information to you in [employer] or would you prefer to receive a phone call?:   Patient would prefer a phone call   Okay to leave a detailed message?: Yes at Cell number on file:    Telephone Information:   Mobile [phone]

## 2023-09-11 ENCOUNTER — PATIENT OUTREACH (OUTPATIENT)
Dept: CARE COORDINATION | Facility: CLINIC | Age: 73
End: 2023-09-11
Payer: COMMERCIAL

## 2023-09-11 NOTE — TELEPHONE ENCOUNTER
See 8/16/23 telephone encounter, hold paperwork was faxed on 8/17/23.    Left voicemail for patient stating that hold orders were faxed previously, if they need to be refaxed, requested patient let us know.

## 2023-09-11 NOTE — TELEPHONE ENCOUNTER
"Patient called back and stated that they need to resent, updated \"reflecting today\".  Patient said the nurse can call him back if they have any questions.  "

## 2023-09-12 ENCOUNTER — TELEPHONE (OUTPATIENT)
Dept: INTERNAL MEDICINE | Facility: CLINIC | Age: 73
End: 2023-09-12
Payer: COMMERCIAL

## 2023-09-12 NOTE — TELEPHONE ENCOUNTER
September 12, 2023    Home health orders was received via fax for Dr. Kraus to sign.  Patient label was attached to paperwork and placed in provider's inbox to be signed.    Joanna Quiles

## 2023-09-13 NOTE — TELEPHONE ENCOUNTER
"September 13, 2023    Home health orders was picked up from outbox of Dr. Kraus.  Paperwork has been reviewed and is incomplete, not circled \"Yes\" or \"No\" to hold blood thinning medication. Paperwork placed back in provider's inbox.    Joanna Quiles    "

## 2023-09-14 NOTE — TELEPHONE ENCOUNTER
September 14, 2023    Home health orders was picked up from outbox of Dr. Kraus.  Paperwork has been reviewed and is complete.  Per initial initial request, this was sent via fax to [phone].     Joanna Quiles

## 2023-09-19 ENCOUNTER — TRANSFERRED RECORDS (OUTPATIENT)
Dept: HEALTH INFORMATION MANAGEMENT | Facility: CLINIC | Age: 73
End: 2023-09-19
Payer: COMMERCIAL

## 2023-09-20 ENCOUNTER — TELEPHONE (OUTPATIENT)
Dept: INTERNAL MEDICINE | Facility: CLINIC | Age: 73
End: 2023-09-20
Payer: COMMERCIAL

## 2023-09-20 NOTE — TELEPHONE ENCOUNTER
September 20, 2023    Outside records received from [facility].  Records were placed in the inbox for Dr. Kraus to review.  A copy was sent to HIM to be scanned into the patient's chart.    Joanna Quiles

## 2023-10-11 ENCOUNTER — TRANSFERRED RECORDS (OUTPATIENT)
Dept: HEALTH INFORMATION MANAGEMENT | Facility: CLINIC | Age: 73
End: 2023-10-11
Payer: COMMERCIAL

## 2023-10-12 ENCOUNTER — OFFICE VISIT (OUTPATIENT)
Dept: INTERNAL MEDICINE | Facility: CLINIC | Age: 73
End: 2023-10-12
Payer: COMMERCIAL

## 2023-10-12 VITALS
WEIGHT: 226.3 LBS | HEIGHT: 73 IN | BODY MASS INDEX: 29.99 KG/M2 | SYSTOLIC BLOOD PRESSURE: 108 MMHG | DIASTOLIC BLOOD PRESSURE: 68 MMHG | HEART RATE: 93 BPM | RESPIRATION RATE: 13 BRPM | OXYGEN SATURATION: 97 % | TEMPERATURE: 98.2 F

## 2023-10-12 DIAGNOSIS — K21.9 GASTROESOPHAGEAL REFLUX DISEASE WITHOUT ESOPHAGITIS: ICD-10-CM

## 2023-10-12 DIAGNOSIS — Z95.5 HISTORY OF CORONARY ARTERY STENT PLACEMENT: ICD-10-CM

## 2023-10-12 DIAGNOSIS — E78.5 DYSLIPIDEMIA, GOAL LDL BELOW 70: ICD-10-CM

## 2023-10-12 DIAGNOSIS — Z23 ENCOUNTER FOR IMMUNIZATION: ICD-10-CM

## 2023-10-12 DIAGNOSIS — Z79.899 CHRONIC PRESCRIPTION BENZODIAZEPINE USE: ICD-10-CM

## 2023-10-12 DIAGNOSIS — Z29.11 NEED FOR VACCINATION AGAINST RESPIRATORY SYNCYTIAL VIRUS: ICD-10-CM

## 2023-10-12 DIAGNOSIS — Z23 NEED FOR SHINGLES VACCINE: ICD-10-CM

## 2023-10-12 DIAGNOSIS — E87.6 LOW BLOOD POTASSIUM: ICD-10-CM

## 2023-10-12 DIAGNOSIS — M48.062 SPINAL STENOSIS, LUMBAR REGION, WITH NEUROGENIC CLAUDICATION: ICD-10-CM

## 2023-10-12 DIAGNOSIS — K59.01 SLOW TRANSIT CONSTIPATION: ICD-10-CM

## 2023-10-12 DIAGNOSIS — I25.83 CORONARY ARTERY DISEASE DUE TO LIPID RICH PLAQUE: ICD-10-CM

## 2023-10-12 DIAGNOSIS — I10 ESSENTIAL HYPERTENSION: ICD-10-CM

## 2023-10-12 DIAGNOSIS — E66.01 MORBID (SEVERE) OBESITY DUE TO EXCESS CALORIES (H): Chronic | ICD-10-CM

## 2023-10-12 DIAGNOSIS — E11.9 TYPE 2 DIABETES, HBA1C GOAL < 8% (H): Primary | ICD-10-CM

## 2023-10-12 DIAGNOSIS — E53.8 VITAMIN B12 DEFICIENCY (NON ANEMIC): ICD-10-CM

## 2023-10-12 DIAGNOSIS — I25.10 CORONARY ARTERY DISEASE DUE TO LIPID RICH PLAQUE: ICD-10-CM

## 2023-10-12 PROBLEM — Z86.0100 HISTORY OF COLONIC POLYPS: Status: ACTIVE | Noted: 2023-10-12

## 2023-10-12 PROBLEM — K59.00 CONSTIPATION: Status: ACTIVE | Noted: 2023-10-12

## 2023-10-12 LAB
ANION GAP SERPL CALCULATED.3IONS-SCNC: 15 MMOL/L (ref 7–15)
BUN SERPL-MCNC: 19 MG/DL (ref 8–23)
CALCIUM SERPL-MCNC: 9.9 MG/DL (ref 8.8–10.2)
CHLORIDE SERPL-SCNC: 99 MMOL/L (ref 98–107)
CREAT SERPL-MCNC: 0.91 MG/DL (ref 0.67–1.17)
DEPRECATED HCO3 PLAS-SCNC: 24 MMOL/L (ref 22–29)
EGFRCR SERPLBLD CKD-EPI 2021: 89 ML/MIN/1.73M2
GLUCOSE SERPL-MCNC: 120 MG/DL (ref 70–99)
HBA1C MFR BLD: 7.1 % (ref 0–5.6)
POTASSIUM SERPL-SCNC: 4.4 MMOL/L (ref 3.4–5.3)
SODIUM SERPL-SCNC: 138 MMOL/L (ref 135–145)
VIT B12 SERPL-MCNC: 754 PG/ML (ref 232–1245)

## 2023-10-12 PROCEDURE — 90662 IIV NO PRSV INCREASED AG IM: CPT | Performed by: INTERNAL MEDICINE

## 2023-10-12 PROCEDURE — G0008 ADMIN INFLUENZA VIRUS VAC: HCPCS | Mod: 59 | Performed by: INTERNAL MEDICINE

## 2023-10-12 PROCEDURE — 80048 BASIC METABOLIC PNL TOTAL CA: CPT | Performed by: INTERNAL MEDICINE

## 2023-10-12 PROCEDURE — 36415 COLL VENOUS BLD VENIPUNCTURE: CPT | Performed by: INTERNAL MEDICINE

## 2023-10-12 PROCEDURE — 82607 VITAMIN B-12: CPT | Performed by: INTERNAL MEDICINE

## 2023-10-12 PROCEDURE — 91320 SARSCV2 VAC 30MCG TRS-SUC IM: CPT | Performed by: INTERNAL MEDICINE

## 2023-10-12 PROCEDURE — 99214 OFFICE O/P EST MOD 30 MIN: CPT | Mod: 25 | Performed by: INTERNAL MEDICINE

## 2023-10-12 PROCEDURE — 83036 HEMOGLOBIN GLYCOSYLATED A1C: CPT | Performed by: INTERNAL MEDICINE

## 2023-10-12 PROCEDURE — 90480 ADMN SARSCOV2 VAC 1/ONLY CMP: CPT | Performed by: INTERNAL MEDICINE

## 2023-10-12 RX ORDER — LOSARTAN POTASSIUM 25 MG/1
25 TABLET ORAL DAILY
Qty: 90 TABLET | Refills: 3 | Status: SHIPPED | OUTPATIENT
Start: 2023-10-12

## 2023-10-12 RX ORDER — CHLORTHALIDONE 25 MG/1
12.5 TABLET ORAL DAILY
Qty: 45 TABLET | Refills: 3 | Status: SHIPPED | OUTPATIENT
Start: 2023-10-12 | End: 2024-09-09

## 2023-10-12 RX ORDER — RESPIRATORY SYNCYTIAL VIRUS VACCINE 120MCG/0.5
0.5 KIT INTRAMUSCULAR ONCE
Qty: 1 EACH | Refills: 0 | Status: CANCELLED | OUTPATIENT
Start: 2023-10-12 | End: 2023-10-12

## 2023-10-12 RX ORDER — LORAZEPAM 1 MG/1
1 TABLET ORAL DAILY PRN
Qty: 60 TABLET | Refills: 3 | Status: SHIPPED | OUTPATIENT
Start: 2023-10-12 | End: 2024-05-07

## 2023-10-12 RX ORDER — METFORMIN HCL 500 MG
1000 TABLET, EXTENDED RELEASE 24 HR ORAL
Qty: 180 TABLET | Refills: 3 | Status: SHIPPED | OUTPATIENT
Start: 2023-10-12 | End: 2024-09-23

## 2023-10-12 RX ORDER — ATORVASTATIN CALCIUM 80 MG/1
80 TABLET, FILM COATED ORAL DAILY
Qty: 90 TABLET | Refills: 3 | Status: SHIPPED | OUTPATIENT
Start: 2023-10-12 | End: 2023-11-09

## 2023-10-12 ASSESSMENT — ENCOUNTER SYMPTOMS
NERVOUS/ANXIOUS: 0
HEADACHES: 0
PALPITATIONS: 0
DYSURIA: 0
HEMATOCHEZIA: 0
CHILLS: 0
FREQUENCY: 1
DIARRHEA: 0
SORE THROAT: 0
HEMATURIA: 0
COUGH: 0
JOINT SWELLING: 0
ARTHRALGIAS: 1
NAUSEA: 0
CONSTIPATION: 0
ABDOMINAL PAIN: 0
MYALGIAS: 1
PARESTHESIAS: 0
SHORTNESS OF BREATH: 0
DIZZINESS: 0
EYE PAIN: 0
FEVER: 0
WEAKNESS: 1
HEARTBURN: 0

## 2023-10-12 ASSESSMENT — ACTIVITIES OF DAILY LIVING (ADL): CURRENT_FUNCTION: HOUSEWORK REQUIRES ASSISTANCE

## 2023-10-12 ASSESSMENT — ASTHMA QUESTIONNAIRES: ACT_TOTALSCORE: 25
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
## 2023-10-13 ENCOUNTER — TRANSFERRED RECORDS (OUTPATIENT)
Dept: HEALTH INFORMATION MANAGEMENT | Facility: CLINIC | Age: 73
End: 2023-10-13
Payer: COMMERCIAL

## 2023-11-09 ENCOUNTER — OFFICE VISIT (OUTPATIENT)
Dept: CARDIOLOGY | Facility: CLINIC | Age: 73
End: 2023-11-09
Payer: COMMERCIAL

## 2023-11-09 VITALS
SYSTOLIC BLOOD PRESSURE: 120 MMHG | DIASTOLIC BLOOD PRESSURE: 74 MMHG | RESPIRATION RATE: 16 BRPM | BODY MASS INDEX: 31.16 KG/M2 | HEIGHT: 73 IN | OXYGEN SATURATION: 97 % | HEART RATE: 100 BPM | TEMPERATURE: 98 F | WEIGHT: 235.1 LBS

## 2023-11-09 DIAGNOSIS — I25.10 CORONARY ARTERY DISEASE INVOLVING NATIVE CORONARY ARTERY OF NATIVE HEART WITHOUT ANGINA PECTORIS: ICD-10-CM

## 2023-11-09 DIAGNOSIS — E78.5 DYSLIPIDEMIA, GOAL LDL BELOW 70: ICD-10-CM

## 2023-11-09 DIAGNOSIS — R79.0 LOW MAGNESIUM LEVEL: ICD-10-CM

## 2023-11-09 DIAGNOSIS — I10 HYPERTENSION, UNSPECIFIED TYPE: Primary | ICD-10-CM

## 2023-11-09 PROCEDURE — 99214 OFFICE O/P EST MOD 30 MIN: CPT | Performed by: INTERNAL MEDICINE

## 2023-11-09 RX ORDER — CLOPIDOGREL BISULFATE 75 MG/1
75 TABLET ORAL DAILY
Qty: 90 TABLET | Refills: 3 | Status: SHIPPED | OUTPATIENT
Start: 2023-11-09

## 2023-11-09 RX ORDER — ATORVASTATIN CALCIUM 80 MG/1
80 TABLET, FILM COATED ORAL DAILY
Qty: 90 TABLET | Refills: 3 | Status: SHIPPED | OUTPATIENT
Start: 2023-11-09 | End: 2024-10-04

## 2023-11-09 RX ORDER — CLOPIDOGREL BISULFATE 75 MG/1
75 TABLET ORAL DAILY
Qty: 90 TABLET | Refills: 0 | Status: SHIPPED | OUTPATIENT
Start: 2023-11-09 | End: 2023-11-09 | Stop reason: ALTCHOICE
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
## 2023-11-10 ENCOUNTER — PATIENT OUTREACH (OUTPATIENT)
Dept: GASTROENTEROLOGY | Facility: CLINIC | Age: 73
End: 2023-11-10
Payer: COMMERCIAL

## 2023-11-21 ENCOUNTER — TRANSFERRED RECORDS (OUTPATIENT)
Dept: HEALTH INFORMATION MANAGEMENT | Facility: CLINIC | Age: 73
End: 2023-11-21
Payer: COMMERCIAL

## 2024-01-05 DIAGNOSIS — U07.1 INFECTION DUE TO 2019 NOVEL CORONAVIRUS: Primary | ICD-10-CM

## 2024-01-06 ENCOUNTER — HEALTH MAINTENANCE LETTER (OUTPATIENT)
Age: 74
End: 2024-01-06

## 2024-01-11 ENCOUNTER — TRANSFERRED RECORDS (OUTPATIENT)
Dept: HEALTH INFORMATION MANAGEMENT | Facility: CLINIC | Age: 74
End: 2024-01-11
Payer: COMMERCIAL

## 2024-01-12 ENCOUNTER — TELEPHONE (OUTPATIENT)
Dept: INTERNAL MEDICINE | Facility: CLINIC | Age: 74
End: 2024-01-12
Payer: COMMERCIAL

## 2024-01-12 NOTE — TELEPHONE ENCOUNTER
January 12, 2024    [facility] Request Hold Blood Thinning Medication was received via fax for Dr. Kraus to sign.  Patient label was attached to paperwork and placed in provider's inbox to be signed.    Reba Ellis

## 2024-01-15 NOTE — TELEPHONE ENCOUNTER
January 15, 2024    [facility] Request to  Hold Blood thinning Medication was picked up from outbox of Dr. Kraus.  Paperwork has been reviewed and is complete.  Per initial initial request, this was sent via fax to [phone].     Reba Ellis

## 2024-01-19 ENCOUNTER — TRANSFERRED RECORDS (OUTPATIENT)
Dept: HEALTH INFORMATION MANAGEMENT | Facility: CLINIC | Age: 74
End: 2024-01-19
Payer: COMMERCIAL

## 2024-01-19 LAB
RETINOPATHY: NEGATIVE
RETINOPATHY: NEGATIVE

## 2024-02-08 ENCOUNTER — TRANSFERRED RECORDS (OUTPATIENT)
Dept: HEALTH INFORMATION MANAGEMENT | Facility: CLINIC | Age: 74
End: 2024-02-08
Payer: COMMERCIAL

## 2024-02-09 ENCOUNTER — TRANSFERRED RECORDS (OUTPATIENT)
Dept: HEALTH INFORMATION MANAGEMENT | Facility: CLINIC | Age: 74
End: 2024-02-09
Payer: COMMERCIAL

## 2024-02-23 ENCOUNTER — TELEPHONE (OUTPATIENT)
Dept: INTERNAL MEDICINE | Facility: CLINIC | Age: 74
End: 2024-02-23
Payer: COMMERCIAL

## 2024-02-23 NOTE — TELEPHONE ENCOUNTER
February 23, 2024    [facility] Hold Blood Thinning Medication was received via fax for Dr. Kraus to sign.  Patient label was attached to paperwork and placed in provider's inbox to be signed.    Reba Ellis

## 2024-02-27 NOTE — TELEPHONE ENCOUNTER
February 27, 2024    [facility] Request to Hold Blood Thinning Medication was picked up from outbox of Dr. Kraus.  Paperwork has been reviewed and is complete.  Per initial initial request, this was sent via fax to [phone].     Reba Ellis

## 2024-03-26 ENCOUNTER — TRANSFERRED RECORDS (OUTPATIENT)
Dept: HEALTH INFORMATION MANAGEMENT | Facility: CLINIC | Age: 74
End: 2024-03-26
Payer: COMMERCIAL

## 2024-04-05 ENCOUNTER — TRANSFERRED RECORDS (OUTPATIENT)
Dept: HEALTH INFORMATION MANAGEMENT | Facility: CLINIC | Age: 74
End: 2024-04-05
Payer: COMMERCIAL

## 2024-04-11 ENCOUNTER — TELEPHONE (OUTPATIENT)
Dept: INTERNAL MEDICINE | Facility: CLINIC | Age: 74
End: 2024-04-11
Payer: COMMERCIAL

## 2024-04-11 ASSESSMENT — ASTHMA QUESTIONNAIRES
QUESTION_3 LAST FOUR WEEKS HOW OFTEN DID YOUR ASTHMA SYMPTOMS (WHEEZING, COUGHING, SHORTNESS OF BREATH, CHEST TIGHTNESS OR PAIN) WAKE YOU UP AT NIGHT OR EARLIER THAN USUAL IN THE MORNING: ONCE A WEEK
QUESTION_1 LAST FOUR WEEKS HOW MUCH OF THE TIME DID YOUR ASTHMA KEEP YOU FROM GETTING AS MUCH DONE AT WORK, SCHOOL OR AT HOME: SOME OF THE TIME
ACT_TOTALSCORE: 15
QUESTION_2 LAST FOUR WEEKS HOW OFTEN HAVE YOU HAD SHORTNESS OF BREATH: ONCE A DAY
ACT_TOTALSCORE: 15
QUESTION_5 LAST FOUR WEEKS HOW WOULD YOU RATE YOUR ASTHMA CONTROL: SOMEWHAT CONTROLLED
QUESTION_4 LAST FOUR WEEKS HOW OFTEN HAVE YOU USED YOUR RESCUE INHALER OR NEBULIZER MEDICATION (SUCH AS ALBUTEROL): ONCE A WEEK OR LESS

## 2024-04-11 NOTE — TELEPHONE ENCOUNTER
April 11, 2024    [facility] Hold orders for Plavix was received via fax for Dr. Kraus to sign.  Patient label was attached to paperwork and placed in provider's inbox to be signed.    Reba Ellis

## 2024-04-12 NOTE — TELEPHONE ENCOUNTER
April 12, 2024    [facility] orders to Hold Plavix was picked up from outbox of Dr. Kraus.  Paperwork has been reviewed and is complete.  Per initial initial request, this was sent via fax to [phone].     Reba Ellis

## 2024-04-17 ENCOUNTER — OFFICE VISIT (OUTPATIENT)
Dept: INTERNAL MEDICINE | Facility: CLINIC | Age: 74
End: 2024-04-17
Payer: COMMERCIAL

## 2024-04-17 ENCOUNTER — TELEPHONE (OUTPATIENT)
Dept: INTERNAL MEDICINE | Facility: CLINIC | Age: 74
End: 2024-04-17

## 2024-04-17 VITALS
OXYGEN SATURATION: 96 % | DIASTOLIC BLOOD PRESSURE: 58 MMHG | WEIGHT: 242.3 LBS | HEART RATE: 87 BPM | TEMPERATURE: 97.9 F | SYSTOLIC BLOOD PRESSURE: 118 MMHG | HEIGHT: 73 IN | BODY MASS INDEX: 32.11 KG/M2 | RESPIRATION RATE: 20 BRPM

## 2024-04-17 DIAGNOSIS — M48.062 SPINAL STENOSIS, LUMBAR REGION, WITH NEUROGENIC CLAUDICATION: ICD-10-CM

## 2024-04-17 DIAGNOSIS — E11.9 TYPE 2 DIABETES, HBA1C GOAL < 8% (H): Primary | ICD-10-CM

## 2024-04-17 DIAGNOSIS — Z01.818 PREOPERATIVE EXAMINATION: ICD-10-CM

## 2024-04-17 PROBLEM — E11.42 DIABETIC POLYNEUROPATHY ASSOCIATED WITH TYPE 2 DIABETES MELLITUS (H): Status: ACTIVE | Noted: 2024-04-17

## 2024-04-17 PROBLEM — K22.2 ESOPHAGEAL STRICTURE: Status: ACTIVE | Noted: 2024-04-17

## 2024-04-17 LAB
ERYTHROCYTE [DISTWIDTH] IN BLOOD BY AUTOMATED COUNT: 13 % (ref 10–15)
HBA1C MFR BLD: 7.4 % (ref 0–5.6)
HCT VFR BLD AUTO: 40.4 % (ref 40–53)
HGB BLD-MCNC: 13 G/DL (ref 13.3–17.7)
MCH RBC QN AUTO: 28.6 PG (ref 26.5–33)
MCHC RBC AUTO-ENTMCNC: 32.2 G/DL (ref 31.5–36.5)
MCV RBC AUTO: 89 FL (ref 78–100)
PLATELET # BLD AUTO: 272 10E3/UL (ref 150–450)
RBC # BLD AUTO: 4.55 10E6/UL (ref 4.4–5.9)
WBC # BLD AUTO: 7.3 10E3/UL (ref 4–11)

## 2024-04-17 PROCEDURE — 99214 OFFICE O/P EST MOD 30 MIN: CPT | Performed by: INTERNAL MEDICINE

## 2024-04-17 PROCEDURE — 80048 BASIC METABOLIC PNL TOTAL CA: CPT | Performed by: INTERNAL MEDICINE

## 2024-04-17 PROCEDURE — 83036 HEMOGLOBIN GLYCOSYLATED A1C: CPT | Performed by: INTERNAL MEDICINE

## 2024-04-17 PROCEDURE — 85027 COMPLETE CBC AUTOMATED: CPT | Performed by: INTERNAL MEDICINE

## 2024-04-17 PROCEDURE — 83721 ASSAY OF BLOOD LIPOPROTEIN: CPT | Performed by: INTERNAL MEDICINE

## 2024-04-17 PROCEDURE — 36415 COLL VENOUS BLD VENIPUNCTURE: CPT | Performed by: INTERNAL MEDICINE

## 2024-04-17 RX ORDER — RESPIRATORY SYNCYTIAL VIRUS VACCINE 120MCG/0.5
0.5 KIT INTRAMUSCULAR ONCE
Qty: 1 EACH | Refills: 0 | Status: CANCELLED | OUTPATIENT
Start: 2024-04-17 | End: 2024-04-17

## 2024-04-17 ASSESSMENT — PAIN SCALES - GENERAL: PAINLEVEL: SEVERE PAIN (6)
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
## 2024-04-17 NOTE — TELEPHONE ENCOUNTER
Order/Referral Request    Who is requesting: [facility]    Orders being requested: HOLD AND BRIDGE ORDERS    Reason service is needed/diagnosis: PT HAS AN EGD SCHEDULED MAY 20TH WITH [facility]    When are orders needed by: 7 DAY HOLD OF MEDICATION PRIOR TO PROCEDURE (MAY17TH START OF HOLD)        Does patient have an appointment scheduled?: Yes: MAY 20TH EDG UPPER ENDOSCOPY        Could we send this information to you in [facility] or would you prefer to receive a phone call?:   [phone] CALLBACK FOR [facility] CAN LEAVE VM.

## 2024-04-18 LAB
ANION GAP SERPL CALCULATED.3IONS-SCNC: 13 MMOL/L (ref 7–15)
BUN SERPL-MCNC: 18.3 MG/DL (ref 8–23)
CALCIUM SERPL-MCNC: 9.7 MG/DL (ref 8.8–10.2)
CHLORIDE SERPL-SCNC: 98 MMOL/L (ref 98–107)
CREAT SERPL-MCNC: 0.97 MG/DL (ref 0.67–1.17)
DEPRECATED HCO3 PLAS-SCNC: 22 MMOL/L (ref 22–29)
EGFRCR SERPLBLD CKD-EPI 2021: 82 ML/MIN/1.73M2
GLUCOSE SERPL-MCNC: 190 MG/DL (ref 70–99)
LDLC SERPL DIRECT ASSAY-MCNC: 65 MG/DL
POTASSIUM SERPL-SCNC: 4.2 MMOL/L (ref 3.4–5.3)
SODIUM SERPL-SCNC: 133 MMOL/L (ref 135–145)

## 2024-05-01 ENCOUNTER — TRANSFERRED RECORDS (OUTPATIENT)
Dept: HEALTH INFORMATION MANAGEMENT | Facility: CLINIC | Age: 74
End: 2024-05-01
Payer: COMMERCIAL

## 2024-05-07 ENCOUNTER — MYC MEDICAL ADVICE (OUTPATIENT)
Dept: INTERNAL MEDICINE | Facility: CLINIC | Age: 74
End: 2024-05-07
Payer: COMMERCIAL

## 2024-05-07 DIAGNOSIS — E11.9 TYPE 2 DIABETES, HBA1C GOAL < 8% (H): ICD-10-CM

## 2024-05-07 DIAGNOSIS — Z79.899 CHRONIC PRESCRIPTION BENZODIAZEPINE USE: ICD-10-CM

## 2024-05-07 RX ORDER — LORAZEPAM 1 MG/1
1 TABLET ORAL DAILY PRN
Qty: 60 TABLET | Refills: 3 | Status: SHIPPED | OUTPATIENT
Start: 2024-05-07

## 2024-05-23 ENCOUNTER — TRANSFERRED RECORDS (OUTPATIENT)
Dept: HEALTH INFORMATION MANAGEMENT | Facility: CLINIC | Age: 74
End: 2024-05-23
Payer: COMMERCIAL

## 2024-06-04 ENCOUNTER — TRANSFERRED RECORDS (OUTPATIENT)
Dept: HEALTH INFORMATION MANAGEMENT | Facility: CLINIC | Age: 74
End: 2024-06-04
Payer: COMMERCIAL

## 2024-06-19 ENCOUNTER — TRANSFERRED RECORDS (OUTPATIENT)
Dept: HEALTH INFORMATION MANAGEMENT | Facility: CLINIC | Age: 74
End: 2024-06-19
Payer: COMMERCIAL

## 2024-06-27 ASSESSMENT — ASTHMA QUESTIONNAIRES
ACT_TOTALSCORE: 24
QUESTION_1 LAST FOUR WEEKS HOW MUCH OF THE TIME DID YOUR ASTHMA KEEP YOU FROM GETTING AS MUCH DONE AT WORK, SCHOOL OR AT HOME: NONE OF THE TIME
QUESTION_5 LAST FOUR WEEKS HOW WOULD YOU RATE YOUR ASTHMA CONTROL: COMPLETELY CONTROLLED
QUESTION_2 LAST FOUR WEEKS HOW OFTEN HAVE YOU HAD SHORTNESS OF BREATH: ONCE OR TWICE A WEEK
ACT_TOTALSCORE: 24
QUESTION_4 LAST FOUR WEEKS HOW OFTEN HAVE YOU USED YOUR RESCUE INHALER OR NEBULIZER MEDICATION (SUCH AS ALBUTEROL): NOT AT ALL
QUESTION_3 LAST FOUR WEEKS HOW OFTEN DID YOUR ASTHMA SYMPTOMS (WHEEZING, COUGHING, SHORTNESS OF BREATH, CHEST TIGHTNESS OR PAIN) WAKE YOU UP AT NIGHT OR EARLIER THAN USUAL IN THE MORNING: NOT AT ALL

## 2024-07-02 ENCOUNTER — OFFICE VISIT (OUTPATIENT)
Dept: INTERNAL MEDICINE | Facility: CLINIC | Age: 74
End: 2024-07-02
Payer: COMMERCIAL

## 2024-07-02 VITALS
TEMPERATURE: 97.5 F | BODY MASS INDEX: 32.1 KG/M2 | DIASTOLIC BLOOD PRESSURE: 79 MMHG | HEART RATE: 93 BPM | HEIGHT: 73 IN | RESPIRATION RATE: 14 BRPM | OXYGEN SATURATION: 96 % | WEIGHT: 242.2 LBS | SYSTOLIC BLOOD PRESSURE: 116 MMHG

## 2024-07-02 DIAGNOSIS — Z01.818 PREOPERATIVE EXAMINATION: Primary | ICD-10-CM

## 2024-07-02 DIAGNOSIS — E78.5 HYPERLIPIDEMIA LDL GOAL <100: ICD-10-CM

## 2024-07-02 DIAGNOSIS — E11.9 DM TYPE 2, GOAL HBA1C < 8% (H): ICD-10-CM

## 2024-07-02 DIAGNOSIS — R39.12 BENIGN PROSTATIC HYPERPLASIA WITH WEAK URINARY STREAM: Chronic | ICD-10-CM

## 2024-07-02 DIAGNOSIS — N40.1 BENIGN PROSTATIC HYPERPLASIA WITH WEAK URINARY STREAM: Chronic | ICD-10-CM

## 2024-07-02 LAB
ANION GAP SERPL CALCULATED.3IONS-SCNC: 12 MMOL/L (ref 7–15)
BUN SERPL-MCNC: 18 MG/DL (ref 8–23)
CALCIUM SERPL-MCNC: 9.8 MG/DL (ref 8.8–10.2)
CHLORIDE SERPL-SCNC: 99 MMOL/L (ref 98–107)
CREAT SERPL-MCNC: 0.86 MG/DL (ref 0.67–1.17)
DEPRECATED HCO3 PLAS-SCNC: 23 MMOL/L (ref 22–29)
EGFRCR SERPLBLD CKD-EPI 2021: >90 ML/MIN/1.73M2
ERYTHROCYTE [DISTWIDTH] IN BLOOD BY AUTOMATED COUNT: 13.1 % (ref 10–15)
GLUCOSE SERPL-MCNC: 174 MG/DL (ref 70–99)
HBA1C MFR BLD: 8.1 % (ref 0–5.6)
HCT VFR BLD AUTO: 40.4 % (ref 40–53)
HGB BLD-MCNC: 13.2 G/DL (ref 13.3–17.7)
LDLC SERPL DIRECT ASSAY-MCNC: 73 MG/DL
MCH RBC QN AUTO: 28.8 PG (ref 26.5–33)
MCHC RBC AUTO-ENTMCNC: 32.7 G/DL (ref 31.5–36.5)
MCV RBC AUTO: 88 FL (ref 78–100)
PLATELET # BLD AUTO: 291 10E3/UL (ref 150–450)
POTASSIUM SERPL-SCNC: 4.2 MMOL/L (ref 3.4–5.3)
RBC # BLD AUTO: 4.59 10E6/UL (ref 4.4–5.9)
SODIUM SERPL-SCNC: 134 MMOL/L (ref 135–145)
WBC # BLD AUTO: 7 10E3/UL (ref 4–11)

## 2024-07-02 PROCEDURE — 83036 HEMOGLOBIN GLYCOSYLATED A1C: CPT | Performed by: INTERNAL MEDICINE

## 2024-07-02 PROCEDURE — 85027 COMPLETE CBC AUTOMATED: CPT | Performed by: INTERNAL MEDICINE

## 2024-07-02 PROCEDURE — 99214 OFFICE O/P EST MOD 30 MIN: CPT | Performed by: INTERNAL MEDICINE

## 2024-07-02 PROCEDURE — 83721 ASSAY OF BLOOD LIPOPROTEIN: CPT | Performed by: INTERNAL MEDICINE

## 2024-07-02 PROCEDURE — 36415 COLL VENOUS BLD VENIPUNCTURE: CPT | Performed by: INTERNAL MEDICINE

## 2024-07-02 PROCEDURE — 80048 BASIC METABOLIC PNL TOTAL CA: CPT | Performed by: INTERNAL MEDICINE

## 2024-07-02 RX ORDER — RESPIRATORY SYNCYTIAL VIRUS VACCINE 120MCG/0.5
0.5 KIT INTRAMUSCULAR ONCE
Qty: 1 EACH | Refills: 0 | Status: CANCELLED | OUTPATIENT
Start: 2024-07-02 | End: 2024-07-02
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
## 2024-07-19 NOTE — OR NURSING
Spinal Stimulator-Medtronic-Pre-op call RN-Pt. needs to bring remote for stimulator.Please inform.

## 2024-07-22 ENCOUNTER — ANESTHESIA EVENT (OUTPATIENT)
Dept: SURGERY | Facility: HOSPITAL | Age: 74
End: 2024-07-22
Payer: COMMERCIAL

## 2024-07-23 ENCOUNTER — HOSPITAL ENCOUNTER (OUTPATIENT)
Facility: HOSPITAL | Age: 74
Discharge: HOME | End: 2024-07-23
Attending: UROLOGY | Admitting: UROLOGY
Payer: COMMERCIAL

## 2024-07-23 ENCOUNTER — ANESTHESIA (OUTPATIENT)
Dept: SURGERY | Facility: HOSPITAL | Age: 74
End: 2024-07-23
Payer: COMMERCIAL

## 2024-07-23 VITALS
DIASTOLIC BLOOD PRESSURE: 68 MMHG | TEMPERATURE: 97.5 F | RESPIRATION RATE: 20 BRPM | SYSTOLIC BLOOD PRESSURE: 138 MMHG | OXYGEN SATURATION: 97 % | HEART RATE: 66 BPM | BODY MASS INDEX: 31.1 KG/M2 | WEIGHT: 235.7 LBS

## 2024-07-23 DIAGNOSIS — N40.1 BENIGN PROSTATIC HYPERPLASIA WITH WEAK URINARY STREAM: Primary | Chronic | ICD-10-CM

## 2024-07-23 DIAGNOSIS — R39.12 BENIGN PROSTATIC HYPERPLASIA WITH WEAK URINARY STREAM: Primary | Chronic | ICD-10-CM

## 2024-07-23 LAB
GLUCOSE BLDC GLUCOMTR-MCNC: 181 MG/DL (ref 70–99)
GLUCOSE BLDC GLUCOMTR-MCNC: 200 MG/DL (ref 70–99)

## 2024-07-23 PROCEDURE — 360N000075 HC SURGERY LEVEL 2, PER MIN: Performed by: UROLOGY

## 2024-07-23 PROCEDURE — 250N000011 HC RX IP 250 OP 636: Performed by: NURSE ANESTHETIST, CERTIFIED REGISTERED

## 2024-07-23 PROCEDURE — 250N000009 HC RX 250: Performed by: NURSE ANESTHETIST, CERTIFIED REGISTERED

## 2024-07-23 PROCEDURE — L8699 PROSTHETIC IMPLANT NOS: HCPCS | Performed by: UROLOGY

## 2024-07-23 PROCEDURE — 52441 CYSTO INSJ TRNSPRSTC 1 IMPLT: CPT | Performed by: NURSE ANESTHETIST, CERTIFIED REGISTERED

## 2024-07-23 PROCEDURE — 250N000009 HC RX 250: Performed by: STUDENT IN AN ORGANIZED HEALTH CARE EDUCATION/TRAINING PROGRAM

## 2024-07-23 PROCEDURE — 710N000012 HC RECOVERY PHASE 2, PER MINUTE: Performed by: UROLOGY

## 2024-07-23 PROCEDURE — 99100 ANES PT EXTEME AGE<1 YR&>70: CPT | Performed by: NURSE ANESTHETIST, CERTIFIED REGISTERED

## 2024-07-23 PROCEDURE — 370N000017 HC ANESTHESIA TECHNICAL FEE, PER MIN: Performed by: UROLOGY

## 2024-07-23 PROCEDURE — 82962 GLUCOSE BLOOD TEST: CPT

## 2024-07-23 PROCEDURE — 999N000141 HC STATISTIC PRE-PROCEDURE NURSING ASSESSMENT: Performed by: UROLOGY

## 2024-07-23 PROCEDURE — 258N000003 HC RX IP 258 OP 636: Performed by: STUDENT IN AN ORGANIZED HEALTH CARE EDUCATION/TRAINING PROGRAM

## 2024-07-23 PROCEDURE — 250N000009 HC RX 250: Performed by: UROLOGY

## 2024-07-23 PROCEDURE — 52441 CYSTO INSJ TRNSPRSTC 1 IMPLT: CPT | Performed by: ANESTHESIOLOGY

## 2024-07-23 PROCEDURE — 250N000011 HC RX IP 250 OP 636: Performed by: NURSE PRACTITIONER

## 2024-07-23 PROCEDURE — 258N000003 HC RX IP 258 OP 636: Performed by: NURSE ANESTHETIST, CERTIFIED REGISTERED

## 2024-07-23 PROCEDURE — 272N000001 HC OR GENERAL SUPPLY STERILE: Performed by: UROLOGY

## 2024-07-23 PROCEDURE — 250N000013 HC RX MED GY IP 250 OP 250 PS 637: Performed by: ANESTHESIOLOGY

## 2024-07-23 PROCEDURE — 710N000009 HC RECOVERY PHASE 1, LEVEL 1, PER MIN: Performed by: UROLOGY

## 2024-07-23 DEVICE — UROLIFT 2 IMPLANT CARTRIDGE
Type: IMPLANTABLE DEVICE | Site: PROSTATE | Status: FUNCTIONAL
Brand: UROLIFT

## 2024-07-23 RX ORDER — FENTANYL CITRATE 50 UG/ML
25 INJECTION, SOLUTION INTRAMUSCULAR; INTRAVENOUS EVERY 5 MIN PRN
Status: DISCONTINUED | OUTPATIENT
Start: 2024-07-23 | End: 2024-07-23 | Stop reason: HOSPADM

## 2024-07-23 RX ORDER — DEXAMETHASONE SODIUM PHOSPHATE 4 MG/ML
4 INJECTION, SOLUTION INTRA-ARTICULAR; INTRALESIONAL; INTRAMUSCULAR; INTRAVENOUS; SOFT TISSUE
Status: DISCONTINUED | OUTPATIENT
Start: 2024-07-23 | End: 2024-07-23 | Stop reason: HOSPADM

## 2024-07-23 RX ORDER — MULTIVIT WITH MINERALS/LUTEIN
TABLET ORAL DAILY
COMMUNITY

## 2024-07-23 RX ORDER — ONDANSETRON 2 MG/ML
4 INJECTION INTRAMUSCULAR; INTRAVENOUS EVERY 30 MIN PRN
Status: DISCONTINUED | OUTPATIENT
Start: 2024-07-23 | End: 2024-07-23 | Stop reason: HOSPADM

## 2024-07-23 RX ORDER — ACETAMINOPHEN 325 MG/1
975 TABLET ORAL
Status: DISCONTINUED | OUTPATIENT
Start: 2024-07-23 | End: 2024-07-23 | Stop reason: HOSPADM

## 2024-07-23 RX ORDER — PROPOFOL 10 MG/ML
INJECTION, EMULSION INTRAVENOUS PRN
Status: DISCONTINUED | OUTPATIENT
Start: 2024-07-23 | End: 2024-07-23

## 2024-07-23 RX ORDER — CEFAZOLIN SODIUM/WATER 2 G/20 ML
2 SYRINGE (ML) INTRAVENOUS SEE ADMIN INSTRUCTIONS
Status: DISCONTINUED | OUTPATIENT
Start: 2024-07-23 | End: 2024-07-23 | Stop reason: HOSPADM

## 2024-07-23 RX ORDER — MEPERIDINE HYDROCHLORIDE 25 MG/ML
12.5 INJECTION INTRAMUSCULAR; INTRAVENOUS; SUBCUTANEOUS EVERY 5 MIN PRN
Status: DISCONTINUED | OUTPATIENT
Start: 2024-07-23 | End: 2024-07-23 | Stop reason: HOSPADM

## 2024-07-23 RX ORDER — HYDROCODONE BITARTRATE AND ACETAMINOPHEN 5; 325 MG/1; MG/1
1 TABLET ORAL EVERY 4 HOURS PRN
Qty: 6 TABLET | Refills: 0 | Status: SHIPPED | OUTPATIENT
Start: 2024-07-23

## 2024-07-23 RX ORDER — KETOROLAC TROMETHAMINE 30 MG/ML
15 INJECTION, SOLUTION INTRAMUSCULAR; INTRAVENOUS
Status: DISCONTINUED | OUTPATIENT
Start: 2024-07-23 | End: 2024-07-23 | Stop reason: HOSPADM

## 2024-07-23 RX ORDER — NALOXONE HYDROCHLORIDE 0.4 MG/ML
0.1 INJECTION, SOLUTION INTRAMUSCULAR; INTRAVENOUS; SUBCUTANEOUS
Status: DISCONTINUED | OUTPATIENT
Start: 2024-07-23 | End: 2024-07-23 | Stop reason: HOSPADM

## 2024-07-23 RX ORDER — DEXAMETHASONE SODIUM PHOSPHATE 10 MG/ML
INJECTION, SOLUTION INTRAMUSCULAR; INTRAVENOUS PRN
Status: DISCONTINUED | OUTPATIENT
Start: 2024-07-23 | End: 2024-07-23

## 2024-07-23 RX ORDER — CEFAZOLIN SODIUM/WATER 2 G/20 ML
2 SYRINGE (ML) INTRAVENOUS
Status: COMPLETED | OUTPATIENT
Start: 2024-07-23 | End: 2024-07-23

## 2024-07-23 RX ORDER — FENTANYL CITRATE 50 UG/ML
50 INJECTION, SOLUTION INTRAMUSCULAR; INTRAVENOUS EVERY 5 MIN PRN
Status: DISCONTINUED | OUTPATIENT
Start: 2024-07-23 | End: 2024-07-23 | Stop reason: HOSPADM

## 2024-07-23 RX ORDER — LIDOCAINE 40 MG/G
CREAM TOPICAL
Status: DISCONTINUED | OUTPATIENT
Start: 2024-07-23 | End: 2024-07-23 | Stop reason: HOSPADM

## 2024-07-23 RX ORDER — ACETAMINOPHEN 325 MG/1
650 TABLET ORAL EVERY 6 HOURS PRN
Status: DISCONTINUED | OUTPATIENT
Start: 2024-07-23 | End: 2024-07-23 | Stop reason: HOSPADM

## 2024-07-23 RX ORDER — ACETAMINOPHEN 325 MG/1
975 TABLET ORAL ONCE
Status: COMPLETED | OUTPATIENT
Start: 2024-07-23 | End: 2024-07-23

## 2024-07-23 RX ORDER — HYDROCODONE BITARTRATE AND ACETAMINOPHEN 5; 325 MG/1; MG/1
1 TABLET ORAL EVERY 4 HOURS PRN
Status: DISCONTINUED | OUTPATIENT
Start: 2024-07-23 | End: 2024-07-23 | Stop reason: HOSPADM

## 2024-07-23 RX ORDER — SODIUM CHLORIDE, SODIUM LACTATE, POTASSIUM CHLORIDE, CALCIUM CHLORIDE 600; 310; 30; 20 MG/100ML; MG/100ML; MG/100ML; MG/100ML
INJECTION, SOLUTION INTRAVENOUS CONTINUOUS
Status: DISCONTINUED | OUTPATIENT
Start: 2024-07-23 | End: 2024-07-23 | Stop reason: HOSPADM

## 2024-07-23 RX ORDER — FENTANYL CITRATE 50 UG/ML
25 INJECTION, SOLUTION INTRAMUSCULAR; INTRAVENOUS
Status: DISCONTINUED | OUTPATIENT
Start: 2024-07-23 | End: 2024-07-23 | Stop reason: HOSPADM

## 2024-07-23 RX ORDER — OXYCODONE HYDROCHLORIDE 5 MG/1
5 TABLET ORAL EVERY 4 HOURS PRN
Status: DISCONTINUED | OUTPATIENT
Start: 2024-07-23 | End: 2024-07-23 | Stop reason: HOSPADM

## 2024-07-23 RX ORDER — HYDROMORPHONE HCL IN WATER/PF 6 MG/30 ML
0.2 PATIENT CONTROLLED ANALGESIA SYRINGE INTRAVENOUS EVERY 5 MIN PRN
Status: DISCONTINUED | OUTPATIENT
Start: 2024-07-23 | End: 2024-07-23 | Stop reason: HOSPADM

## 2024-07-23 RX ORDER — KETAMINE HYDROCHLORIDE 10 MG/ML
INJECTION INTRAMUSCULAR; INTRAVENOUS PRN
Status: DISCONTINUED | OUTPATIENT
Start: 2024-07-23 | End: 2024-07-23

## 2024-07-23 RX ORDER — ONDANSETRON 2 MG/ML
INJECTION INTRAMUSCULAR; INTRAVENOUS PRN
Status: DISCONTINUED | OUTPATIENT
Start: 2024-07-23 | End: 2024-07-23

## 2024-07-23 RX ORDER — OXYCODONE HYDROCHLORIDE 5 MG/1
10 TABLET ORAL EVERY 4 HOURS PRN
Status: DISCONTINUED | OUTPATIENT
Start: 2024-07-23 | End: 2024-07-23 | Stop reason: HOSPADM

## 2024-07-23 RX ORDER — ONDANSETRON 4 MG/1
4 TABLET, ORALLY DISINTEGRATING ORAL EVERY 30 MIN PRN
Status: DISCONTINUED | OUTPATIENT
Start: 2024-07-23 | End: 2024-07-23 | Stop reason: HOSPADM

## 2024-07-23 RX ORDER — FENTANYL CITRATE 50 UG/ML
INJECTION, SOLUTION INTRAMUSCULAR; INTRAVENOUS PRN
Status: DISCONTINUED | OUTPATIENT
Start: 2024-07-23 | End: 2024-07-23

## 2024-07-23 RX ORDER — HYDROMORPHONE HCL IN WATER/PF 6 MG/30 ML
0.4 PATIENT CONTROLLED ANALGESIA SYRINGE INTRAVENOUS EVERY 5 MIN PRN
Status: DISCONTINUED | OUTPATIENT
Start: 2024-07-23 | End: 2024-07-23 | Stop reason: HOSPADM

## 2024-07-23 RX ORDER — LORAZEPAM 2 MG/ML
.5-1 INJECTION INTRAMUSCULAR
Status: DISCONTINUED | OUTPATIENT
Start: 2024-07-23 | End: 2024-07-23 | Stop reason: HOSPADM

## 2024-07-23 RX ADMIN — MIDAZOLAM 2 MG: 1 INJECTION INTRAMUSCULAR; INTRAVENOUS at 07:15

## 2024-07-23 RX ADMIN — SODIUM CHLORIDE, POTASSIUM CHLORIDE, SODIUM LACTATE AND CALCIUM CHLORIDE: 600; 310; 30; 20 INJECTION, SOLUTION INTRAVENOUS at 06:51

## 2024-07-23 RX ADMIN — DEXAMETHASONE SODIUM PHOSPHATE 4 MG: 10 INJECTION, SOLUTION INTRAMUSCULAR; INTRAVENOUS at 07:35

## 2024-07-23 RX ADMIN — ACETAMINOPHEN 975 MG: 325 TABLET ORAL at 07:13

## 2024-07-23 RX ADMIN — PHENYLEPHRINE HYDROCHLORIDE 100 MCG: 10 INJECTION INTRAVENOUS at 07:24

## 2024-07-23 RX ADMIN — LIDOCAINE HYDROCHLORIDE 50 MG: 10 INJECTION, SOLUTION INFILTRATION; PERINEURAL at 07:24

## 2024-07-23 RX ADMIN — ONDANSETRON 4 MG: 2 INJECTION INTRAMUSCULAR; INTRAVENOUS at 07:35

## 2024-07-23 RX ADMIN — FENTANYL CITRATE 50 MCG: 50 INJECTION INTRAMUSCULAR; INTRAVENOUS at 07:24

## 2024-07-23 RX ADMIN — FENTANYL CITRATE 25 MCG: 50 INJECTION INTRAMUSCULAR; INTRAVENOUS at 07:36

## 2024-07-23 RX ADMIN — Medication 2 G: at 07:19

## 2024-07-23 RX ADMIN — KETAMINE HYDROCHLORIDE 25 MG: 10 INJECTION INTRAMUSCULAR; INTRAVENOUS at 07:24

## 2024-07-23 RX ADMIN — PROPOFOL 150 MCG/KG/MIN: 10 INJECTION, EMULSION INTRAVENOUS at 07:24

## 2024-07-23 ASSESSMENT — ACTIVITIES OF DAILY LIVING (ADL)
ADLS_ACUITY_SCORE: 35
ADLS_ACUITY_SCORE: 36
ADLS_ACUITY_SCORE: 35

## 2024-07-23 ASSESSMENT — LIFESTYLE VARIABLES: TOBACCO_USE: 0
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
## 2024-07-23 NOTE — OP NOTE
Location: [facility]     Patient Name: Nadir Cantu  Patient : 1950  Patient MRN: [id number]  Patient CSN: [id number]  Patient PCP: Adolfo Kraus    Date of Service: 24    Pre-operative diagnosis: BPH and weak stream    Post-operative diagnosis: BPH and weak stream    Procedure:  Urolift    Surgeon: Dr. Leobardo Choudhury    EBL: 5 mL    Anesthesia: General    Indication: 74 year old male with BPH and weak stream who has had two prior Urolift procedures (2020 - 6 implants, 23 - 5 implants).  He feels that his voiding has worsened.  Cystoscopy on 24 showed a channel but I felt I could place some implants towards the apex.  Discussed repeating Urolift or proceeding with a TURP. He elected to proceed with Urolift.  Risks, benefits, and alternatives to treatment were discussed with the patient and the patient elected to proceed.    Findings: Urethra was normal.  Prostate was mostly open but there was some apical tissue that I could pin back.  A total of 3 implants were used (2 on left, 1 on right).  Prostate had the appearance of a TURP at the end of the case.    Specimens: None    Procedure:  After written informed consent was obtained the patient was brought into the operating room.  The patient was properly identified prior to the procedure, received preprocedure antibiotics, and had sequential compression devices on the legs.  The patient was then induced under anesthesia.     He was positioned in dorsal lithotomy position and prepped and draped in the usual sterile fashion.     The Urolift cystoscope sheath was inserted into the meatus and advanced into the bladder.  The first Urolift implant was placed towards the left apex, well away from the bladder neck.  A corresponding implant was placed on the right side.  A final implant was placed towards the left mid prostate.  Cystoscopy was conducted with irrigation turned off to assure that the prostatic channel remained patent  without irrigation pressure. Total number of implants used = 3 (2 on left, 1 on right).     At this point, the procedure was completed. He tolerated the procedure well.     Plan: Home. Needs to void prior to discharge.  Follow up on 8/22/23.    Leobardo Choudhury MD  7:49 AM

## 2024-07-23 NOTE — ANESTHESIA CARE TRANSFER NOTE
Patient: Nadir Cantu    Procedure: Procedure(s):  CYSTOSCOPY, UROLIFT       Diagnosis: Benign prostatic hyperplasia with lower urinary tract symptoms [N40.1]  Diagnosis Additional Information: No value filed.    Anesthesia Type:   General     Note:    Oropharynx: oropharynx clear of all foreign objects and spontaneously breathing  Level of Consciousness: awake  Oxygen Supplementation: face mask  Level of Supplemental Oxygen (L/min / FiO2): 10  Independent Airway: airway patency satisfactory and stable  Dentition: dentition unchanged  Vital Signs Stable: post-procedure vital signs reviewed and stable  Report to RN Given: handoff report given  Patient transferred to: PACU    Handoff Report: Identifed the Patient, Identified the Reponsible Provider, Reviewed the pertinent medical history, Discussed the surgical course, Reviewed Intra-OP anesthesia mangement and issues during anesthesia, Set expectations for post-procedure period and Allowed opportunity for questions and acknowledgement of understanding    Vitals:  Vitals Value Taken Time   /70 07/23/24 0806   Temp     Pulse 80 07/23/24 0807   Resp 0 07/23/24 0807   SpO2 97 % 07/23/24 0807   Vitals shown include unfiled device data.    Electronically Signed By: SOFÍA Quintana CRNA  July 23, 2024  8:09 AM

## 2024-07-23 NOTE — INTERVAL H&P NOTE
"I have reviewed the surgical (or preoperative) H&P that is linked to this encounter, and examined the patient. There are no significant changes    Clinical Conditions Present on Arrival:  Clinically Significant Risk Factors Present on Admission         # Hyponatremia: Lowest Na = 134 mmol/L in last 30 days, will monitor as appropriate         # Drug Induced Platelet Defect: home medication list includes an antiplatelet medication      # DMII: A1C = 8.1 % (Ref range: 0.0 - 5.6 %) within past 6 months  # Obesity: Estimated body mass index is 31.1 kg/m  as calculated from the following:    Height as of 7/2/24: 1.854 m (6' 1\").    Weight as of this encounter: 106.9 kg (235 lb 11.2 oz).       "

## 2024-07-23 NOTE — ANESTHESIA POSTPROCEDURE EVALUATION
Patient: Nadir Cantu    Procedure: Procedure(s):  CYSTOSCOPY, UROLIFT       Anesthesia Type:  General    Note:  Disposition: Outpatient   Postop Pain Control: Uneventful            Sign Out: Well controlled pain   PONV: No   Neuro/Psych: Uneventful            Sign Out: Acceptable/Baseline neuro status   Airway/Respiratory: Uneventful            Sign Out: Acceptable/Baseline resp. status   CV/Hemodynamics: Uneventful            Sign Out: Acceptable CV status; No obvious hypovolemia; No obvious fluid overload   Other NRE: NONE   DID A NON-ROUTINE EVENT OCCUR? No       Last vitals:  Vitals Value Taken Time   /65 07/23/24 0845   Temp 36.7  C (98.1  F) 07/23/24 0839   Pulse 71 07/23/24 0848   Resp 20 07/23/24 0845   SpO2 99 % 07/23/24 0849   Vitals shown include unfiled device data.    Electronically Signed By: Guy Dougherty MD  July 23, 2024  10:14 AM

## 2024-07-23 NOTE — ANESTHESIA PROCEDURE NOTES
Airway    Staff -        CRNA: Guy Murray APRN CRNA       Performed By: CRNA  Consent for Airway        Urgency: elective  Indications and Patient Condition       Indications for airway management: deana-procedural       Induction type:intravenous       Mask difficulty assessment: 1 - vent by mask    Final Airway Details       Final airway type: supraglottic airway    Supraglottic Airway Details        Type: LMA       LMA size: 5       Cuff Pressure (cm H2O): 18    Post intubation assessment        Placement verified by: capnometry, equal breath sounds and chest rise        Number of attempts at approach: 1       Number of other approaches attempted: 0       Secured with: tape       Ease of procedure: easy       Dentition: Intact

## 2024-08-14 ENCOUNTER — TELEPHONE (OUTPATIENT)
Dept: INTERNAL MEDICINE | Facility: CLINIC | Age: 74
End: 2024-08-14
Payer: COMMERCIAL

## 2024-08-14 NOTE — TELEPHONE ENCOUNTER
August 14, 2024    [facility] Request Hold Blood Thinning Medication was received via fax for Dr. Kraus.  Patient label was attached to paperwork and placed in provider's inbox to be signed.    Reba Ellis

## 2024-08-15 NOTE — TELEPHONE ENCOUNTER
August 15, 2024    [facility] Request to Hold Blood Thinner Medication  was picked up from outbox of Dr. Kraus and sent via fax to [phone].    Reba Ellis

## 2024-08-22 ENCOUNTER — TRANSFERRED RECORDS (OUTPATIENT)
Dept: HEALTH INFORMATION MANAGEMENT | Facility: CLINIC | Age: 74
End: 2024-08-22
Payer: COMMERCIAL

## 2024-09-09 ENCOUNTER — MYC REFILL (OUTPATIENT)
Dept: INTERNAL MEDICINE | Facility: CLINIC | Age: 74
End: 2024-09-09
Payer: COMMERCIAL

## 2024-09-09 DIAGNOSIS — I10 ESSENTIAL HYPERTENSION: ICD-10-CM

## 2024-09-10 RX ORDER — CHLORTHALIDONE 25 MG/1
12.5 TABLET ORAL DAILY
Qty: 45 TABLET | Refills: 2 | Status: SHIPPED | OUTPATIENT
Start: 2024-09-10

## 2024-09-23 DIAGNOSIS — E11.9 TYPE 2 DIABETES, HBA1C GOAL < 8% (H): ICD-10-CM

## 2024-09-23 RX ORDER — METFORMIN HCL 500 MG
1000 TABLET, EXTENDED RELEASE 24 HR ORAL
Qty: 180 TABLET | Refills: 1 | Status: SHIPPED | OUTPATIENT
Start: 2024-09-23

## 2024-10-02 ENCOUNTER — MYC MEDICAL ADVICE (OUTPATIENT)
Dept: INTERNAL MEDICINE | Facility: CLINIC | Age: 74
End: 2024-10-02
Payer: COMMERCIAL

## 2024-10-02 DIAGNOSIS — E11.9 TYPE 2 DIABETES, HBA1C GOAL < 8% (H): ICD-10-CM

## 2024-10-04 DIAGNOSIS — E78.5 DYSLIPIDEMIA, GOAL LDL BELOW 70: ICD-10-CM

## 2024-10-04 RX ORDER — ATORVASTATIN CALCIUM 80 MG/1
80 TABLET, FILM COATED ORAL DAILY
Qty: 90 TABLET | Refills: 3 | Status: SHIPPED | OUTPATIENT
Start: 2024-10-04

## 2024-10-08 ENCOUNTER — OFFICE VISIT (OUTPATIENT)
Dept: URGENT CARE | Facility: URGENT CARE | Age: 74
End: 2024-10-08
Payer: COMMERCIAL

## 2024-10-08 ENCOUNTER — ANCILLARY PROCEDURE (OUTPATIENT)
Dept: GENERAL RADIOLOGY | Facility: CLINIC | Age: 74
End: 2024-10-08
Attending: INTERNAL MEDICINE
Payer: COMMERCIAL

## 2024-10-08 VITALS
BODY MASS INDEX: 30.61 KG/M2 | RESPIRATION RATE: 18 BRPM | TEMPERATURE: 98.3 F | HEART RATE: 117 BPM | SYSTOLIC BLOOD PRESSURE: 99 MMHG | DIASTOLIC BLOOD PRESSURE: 63 MMHG | OXYGEN SATURATION: 96 % | WEIGHT: 232 LBS

## 2024-10-08 DIAGNOSIS — R05.1 ACUTE COUGH: ICD-10-CM

## 2024-10-08 DIAGNOSIS — R05.1 ACUTE COUGH: Primary | ICD-10-CM

## 2024-10-08 PROCEDURE — 99214 OFFICE O/P EST MOD 30 MIN: CPT | Performed by: INTERNAL MEDICINE

## 2024-10-08 PROCEDURE — 71046 X-RAY EXAM CHEST 2 VIEWS: CPT | Mod: TC | Performed by: RADIOLOGY

## 2024-10-08 RX ORDER — ALBUTEROL SULFATE 90 UG/1
2 INHALANT RESPIRATORY (INHALATION) EVERY 4 HOURS PRN
Qty: 18 G | Refills: 0 | Status: SHIPPED | OUTPATIENT
Start: 2024-10-08

## 2024-10-08 RX ORDER — AZITHROMYCIN 250 MG/1
TABLET, FILM COATED ORAL
Qty: 6 TABLET | Refills: 0 | Status: SHIPPED | OUTPATIENT
Start: 2024-10-08 | End: 2024-10-13

## 2024-10-08 NOTE — PROGRESS NOTES
SUBJECTIVE:  Nadir Cantu is an 74 year old male who presents for not feeling well.  3 weeks ago was in [place] on vacation with some friends.  Friend got sick with a cold and cough, then friend's wife got sick.  Pt came home and 2 weeks ago started having some sxs.  Was working in yard and had some tiredness, nasal congestion, and low grade fever.  A couple times going up stairs had some shortness of breath and inhalers helped.  Has taken nyquil and dayquil which help with congestion and sleep.  Wife has had similar sxs.   Still has fatigue, cough, and head congestion.  Sometimes hot or cold but no fevers.  No n/v/d.   Has hx of asthma and uses inhaler maybe twice a month which helps.  Sneezes some and coughs occasionally.  Occasionally cough is a little productive.    PMH:   has a past medical history of Benign prostatic hyperplasia with lower urinary tract symptoms (08/06/2020), Chronic anticoagulation, Chronic back pain (2005), Chronic prescription benzodiazepine use (06/27/2019), Chronic reflux esophagitis, Constipation (10/12/2023), Coronary artery disease due to lipid rich plaque (04/19/2019), Diabetic polyneuropathy associated with type 2 diabetes mellitus (H), Disorder of bursae and tendons in shoulder region (unknown), Esophageal stricture (04/17/2024), Essential hypertension (2015), Hearing loss (1950), History of colonic polyps, Hyperlipidemia LDL goal <70 (10/10/2022), Irritable bowel syndrome, Morbid (severe) obesity due to excess calories (H), STEVE (obstructive sleep apnea) (2009), Peripheral neuropathy (2018), Peripheral vascular disease (H), PONV (postoperative nausea and vomiting), Reflux esophagitis (1990), Salmonella dysentery (2014), Spinal cord stimulator status, Spinal stenosis, lumbar region, with neurogenic claudication (10/12/2023), Stenosis of coronary stent, Type 2 diabetes, HbA1C goal < 8% (H), Vitamin B12 deficiency (non anemic), and Vitamin D deficiency (2018).  Patient  Active Problem List   Diagnosis    Irritable bowel syndrome    Chronic Reflux Esophagitis    Morbid (severe) obesity due to excess calories (H)    Hearing loss    Coronary artery disease due to lipid rich plaque    Essential hypertension    Obstructive sleep apnea syndrome    Chronic prescription benzodiazepine use    Benign prostatic hyperplasia with lower urinary tract symptoms    Hyperlipidemia LDL goal <70    Type 2 diabetes, HbA1C goal < 8% (H)    History of colonic polyps    Vitamin B12 deficiency (non anemic)    Spinal stenosis, lumbar region, with neurogenic claudication    History of coronary artery stent placement    Constipation    Esophageal stricture    Diabetic polyneuropathy associated with type 2 diabetes mellitus (H)     Social History     Socioeconomic History    Marital status:      Spouse name: None    Number of children: 3    Years of education: None    Highest education level: None   Tobacco Use    Smoking status: Never    Smokeless tobacco: Never   Vaping Use    Vaping status: Never Used   Substance and Sexual Activity    Alcohol use: No    Drug use: No    Sexual activity: Yes     Partners: Female     Social Determinants of Health     Financial Resource Strain: Low Risk  (10/12/2023)    Financial Resource Strain     Within the past 12 months, have you or your family members you live with been unable to get utilities (heat, electricity) when it was really needed?: No   Food Insecurity: Low Risk  (10/12/2023)    Food Insecurity     Within the past 12 months, did you worry that your food would run out before you got money to buy more?: No     Within the past 12 months, did the food you bought just not last and you didn t have money to get more?: No   Transportation Needs: Low Risk  (10/12/2023)    Transportation Needs     Within the past 12 months, has lack of transportation kept you from medical appointments, getting your medicines, non-medical meetings or appointments, work, or from  getting things that you need?: No   Interpersonal Safety: Low Risk  (2024)    Interpersonal Safety     Do you feel physically and emotionally safe where you currently live?: Yes     Within the past 12 months, have you been hit, slapped, kicked or otherwise physically hurt by someone?: No     Within the past 12 months, have you been humiliated or emotionally abused in other ways by your partner or ex-partner?: No   Housing Stability: Low Risk  (10/12/2023)    Housing Stability     Do you have housing? : Yes     Are you worried about losing your housing?: No     Family History   Problem Relation Age of Onset    Kidney failure Father 81.00        no dialysis; diied in     Angina Father         used nitro SL    Sudden Death Sister 68.00    Coronary Artery Disease Sister         autopsy said 100% LAD occlusion    No Known Problems Daughter     No Known Problems Son     No Known Problems Son     Other - See Comments Mother 80.00         of complications after a hip fracture    Carotid Endarterectomy Mother     Aneurysm Mother         aortic, no surgery was done    Atrial fibrillation Brother 73.00       ALLERGIES:  Diphth-tet tox-pertus vac; Niacin; Tetanus and diphtheria toxoids, adsorbed, adult [tetanus-diphtheria toxoids td]; and Tetanus toxoid    Current Outpatient Medications   Medication Sig Dispense Refill    albuterol (PROAIR HFA/PROVENTIL HFA/VENTOLIN HFA) 108 (90 Base) MCG/ACT inhaler Inhale 2 puffs into the lungs every 4 hours as needed for shortness of breath, wheezing or cough. 18 g 0    albuterol (PROAIR HFA/PROVENTIL HFA/VENTOLIN HFA) 108 (90 Base) MCG/ACT inhaler Inhale 2 puffs into the lungs every 6 hours AND 2 puffs before activities 18 g 3    aspirin (ASPIRIN LOW DOSE) 81 MG EC tablet [ASPIRIN (ASPIRIN LOW DOSE) 81 MG EC TABLET] Take 81 mg by mouth daily.             atorvastatin (LIPITOR) 80 MG tablet TAKE 1 TABLET BY MOUTH EVERY DAY 90 tablet 3    azithromycin (ZITHROMAX) 250 MG tablet  Take 2 tablets (500 mg) by mouth daily for 1 day, THEN 1 tablet (250 mg) daily for 4 days. 6 tablet 0    blood glucose (NO BRAND SPECIFIED) lancets standard Use to test blood sugar two times daily or as directed. 300 Lancet 6    blood glucose (NO BRAND SPECIFIED) test strip Use to test blood sugar two times daily or as directed. 300 strip 6    blood glucose monitoring (NO BRAND SPECIFIED) meter device kit Use to test blood sugar two times daily or as directed. 1 kit 1    chlorthalidone (HYGROTON) 25 MG tablet Take 0.5 tablets (12.5 mg) by mouth daily. 45 tablet 2    cholecalciferol, vitamin D3, 1,000 unit (25 mcg) tablet [CHOLECALCIFEROL, VITAMIN D3, 1,000 UNIT (25 MCG) TABLET] Take 1,000 Units by mouth daily.      clopidogrel (PLAVIX) 75 MG tablet Take 1 tablet (75 mg) by mouth daily 90 tablet 3    coenzyme Q10 (COQ-10) 100 mg capsule [COENZYME Q10 (COQ-10) 100 MG CAPSULE] Take 1 capsule (100 mg total) by mouth daily.  0    Dulaglutide 3 MG/0.5ML SOPN Inject 3 mg subcutaneously once a week. 2 mL 1    gabapentin (NEURONTIN) 100 MG capsule Take 300 mg by mouth 3 times daily      HYDROcodone-acetaminophen (NORCO) 5-325 MG tablet Take 1 tablet by mouth every 4 hours as needed for moderate pain 6 tablet 0    LORazepam (ATIVAN) 1 MG tablet Take 1 tablet (1 mg) by mouth daily as needed for anxiety 60 tablet 3    losartan (COZAAR) 25 MG tablet Take 1 tablet (25 mg) by mouth daily 90 tablet 3    magnesium chloride 535 (64 Mg) MG TBEC CR tablet Take 1 tablet (535 mg) by mouth daily 90 tablet 3    metFORMIN (GLUCOPHAGE XR) 500 MG 24 hr tablet Take 2 tablets (1,000 mg) by mouth daily (with dinner). 180 tablet 1    omeprazole (PRILOSEC) 20 MG DR capsule Take 1 capsule (20 mg) by mouth 2 times daily (before meals) 180 capsule 3    psyllium (METAMUCIL) 58.6 % packet Take 1 packet by mouth daily      simethicone (GAS-X) 80 MG chewable tablet [SIMETHICONE (GAS-X) 80 MG CHEWABLE TABLET] Chew 80 mg every 6 (six) hours as needed for  flatulence.             traMADol (ULTRAM) 50 MG tablet       vitamin C (ASCORBIC ACID) 250 MG tablet Take by mouth daily       No current facility-administered medications for this visit.         ROS:  ROS is done and is negative for general/constitutional, eye, ENT, Respiratory, cardiovascular, GI, , Skin, musculoskeletal except as noted elsewhere.  All other review of systems negative except as noted elsewhere.      OBJECTIVE:  BP 99/63   Pulse 117   Temp 98.3  F (36.8  C) (Tympanic)   Resp 18   Wt 105.2 kg (232 lb)   SpO2 96%   BMI 30.61 kg/m    GENERAL APPEARANCE: Alert, in no acute distress  EYES: normal  EARS: External ears normal. Canals clear. TM's normal.  NOSE:mildly inflamed mucosa  OROPHARYNX:normal  NECK:No adenopathy,masses or thyromegaly  RESP: normal and clear to auscultation  CV:regular rate and rhythm and no murmurs, clicks, or gallops  ABDOMEN: Abdomen soft, non-tender. BS normal. No masses, organomegaly  SKIN: no ulcers, lesions or rash  MUSCULOSKELETAL:Musculoskeletal normal      RESULTS  CXR: no infiltrates or effusions noted on my reading.  Await radiology read.  No results found for this or any previous visit (from the past 48 hour(s)).    ASSESSMENT/PLAN:    ASSESSMENT / PLAN:  (R05.1) Acute cough  (primary encounter diagnosis)  Comment: possible atypical bact infection given hx of others he was around and his own sxs.    Plan: XR Chest 2 Views, azithromycin (ZITHROMAX) 250         MG tablet, albuterol (PROAIR HFA/PROVENTIL         HFA/VENTOLIN HFA) 108 (90 Base) MCG/ACT inhaler        Reviewed medication instructions and side effects. Follow up if experiences side effects.. I reviewed supportive care, otc meds to use if needed, expected course, and signs of concern.  Follow up as needed or if does not improve within 1 week(s) or if worsens in any way.  Reviewed red flag symptoms and is to go to the ER if experiences any of these.    See [facility] for orders, medications, letters,  patient instructions  Time spent: 30 minutes spent during visit, reviewing test results, chart review, and charting on day of encounter  Estela Kirkland M.D.

## 2024-10-17 ENCOUNTER — MYC MEDICAL ADVICE (OUTPATIENT)
Dept: INTERNAL MEDICINE | Facility: CLINIC | Age: 74
End: 2024-10-17
Payer: COMMERCIAL

## 2024-10-23 ENCOUNTER — TRANSFERRED RECORDS (OUTPATIENT)
Dept: HEALTH INFORMATION MANAGEMENT | Facility: CLINIC | Age: 74
End: 2024-10-23
Payer: COMMERCIAL

## 2024-10-28 DIAGNOSIS — E87.6 LOW BLOOD POTASSIUM: ICD-10-CM

## 2024-10-28 RX ORDER — LOSARTAN POTASSIUM 25 MG/1
25 TABLET ORAL DAILY
Qty: 90 TABLET | Refills: 3 | Status: SHIPPED | OUTPATIENT
Start: 2024-10-28

## 2024-11-11 ENCOUNTER — TRANSFERRED RECORDS (OUTPATIENT)
Dept: HEALTH INFORMATION MANAGEMENT | Facility: CLINIC | Age: 74
End: 2024-11-11
Payer: COMMERCIAL

## 2024-11-12 ENCOUNTER — TELEPHONE (OUTPATIENT)
Dept: INTERNAL MEDICINE | Facility: CLINIC | Age: 74
End: 2024-11-12
Payer: COMMERCIAL

## 2024-11-12 NOTE — TELEPHONE ENCOUNTER
November 12, 2024    [facility] Request to Hold Blood Thinning Medication was received via fax for Dr. Kraus.  Patient label was attached to paperwork and placed in provider's inbox to be signed.    Reba Ellis

## 2024-11-13 NOTE — TELEPHONE ENCOUNTER
November 13, 2024      [facility] Request to Hold Blood Thinning Medication  was picked up from outbox of Dr. Kraus and sent via fax to [phone].    Pearl Harris

## 2024-11-18 ENCOUNTER — TRANSFERRED RECORDS (OUTPATIENT)
Dept: HEALTH INFORMATION MANAGEMENT | Facility: CLINIC | Age: 74
End: 2024-11-18
Payer: COMMERCIAL

## 2024-12-06 ENCOUNTER — NURSE TRIAGE (OUTPATIENT)
Dept: NURSING | Facility: CLINIC | Age: 74
End: 2024-12-06
Payer: COMMERCIAL

## 2024-12-07 NOTE — TELEPHONE ENCOUNTER
"[facility] Transfer    Nurse Triage SBAR    Is this a 2nd Level Triage? YES, LICENSED PRACTITIONER REVIEW IS REQUIRED    Situation: Elevated BG = 350 after NICOLE x2    Background: Nadir reports that his BG is up to 350 tonight.  Earlier today he had NICOLE x2 - done with [facility]    - Greatly increased appetite.  - BG normally runs between 125-165 when he checks it each AM.  - On Metformin & Trulicity  - Temp = 97.8 infrared    He states that he has had numerous NICOLE's in the past.   He's never checked his BG the same day as his injections but he said he's never felt such an increase in his appetite.    Assessment: as noted above    Protocol Recommended Disposition:   Call PCP Now - Notified that on-call provider would be paged & RN will call back.    Recommendation: Please advise per 2LT - for elevated BG = 350 after NICOLE     Paged to provider - Provider consult indicated.     Reason for page: BG = 350; Post NICOLE x2    Page sent to Dr. Adolfo Krasu by RN at 11:32 pm.     *2LT*  MAI Blanchard--[phone]  Pt: \"R.T.\"  : 50  BG = 350; Norm = 125-165; NICOLE x2 earlier today; Reports extreme appetite; Denies extreme thirst or increased urination  MRN: [id number]   PCP: [facility]    Provider, Dr. Kraus, returning page to [facility] at 11:50 pm.    Provider recommended plan of care:   - Monitor at home; Check BG in the AM as usual but not necessary to check it more tonight or.  - Call back if BG is >400 tomorrow OR if not feeling well, any new symptoms, etc.  - BG might take a day or 2 to return to normal    11:57 pm - Notified pt of MD advice, noted above.  - Verbalized understanding.   - He reports BG is currently down to 260 after drinking water.    Does the patient meet one of the following criteria for ADS visit consideration? 16+ years old, with an [facility] PCP     TIP  Providers, please consider if this condition is appropriate for management at one of our Acute and Diagnostic Services sites.     If patient " is a good candidate, please use dotphrase <dot>triageresponse and select Refer to ADS to document.    Ruby Berry RN  [facility]      Reason for Disposition   [1] Caller has URGENT medication or insulin pump question AND [2] triager unable to answer question    Additional Information   Negative: Unconscious or difficult to awaken   Negative: Acting confused (e.g., disoriented, slurred speech)   Negative: Very weak (e.g., can't stand)   Negative: Sounds like a life-threatening emergency to the triager   Negative: [1] Vomiting AND [2] signs of dehydration (e.g., very dry mouth, lightheaded, dark urine)   Negative: [1] Blood glucose > 240 mg/dL (13.3 mmol/L) AND [2] rapid breathing   Negative: Blood glucose > 500 mg/dL (27.8 mmol/L)   Negative: [1] Blood glucose > 240 mg/dL (13.3 mmol/L) AND [2] urine ketones moderate-large (or more than 1+)   Negative: [1] Blood glucose > 240 mg/dL (13.3 mmol/L) AND [2] blood ketones > 1.4 mmol/L   Negative: [1] Blood glucose > 240 mg/dL (13.3 mmol/L) AND [2] vomiting AND [3] unable to check for ketones (in blood or urine)   Negative: [1] New-onset diabetes suspected (e.g., frequent urination, weak, weight loss) AND [2] vomiting or rapid breathing   Negative: Vomiting lasts > 4 hours   Negative: Patient sounds very sick or weak to the triager   Negative: Fever > 100.4 F (38.0 C)   Negative: Blood glucose > 400 mg/dL (22.2 mmol/L)   Negative: [1] Blood glucose > 300 mg/dL (16.7 mmol/L) AND [2] two or more times in a row   Negative: Urine ketones moderate - large (or blood ketones > 1.4 mmol/L)   Negative: [1] Symptoms of high blood sugar (e.g., abnormally thirsty, frequent urination, weight loss) AND [2] not able to test blood glucose AND [3] pregnant    Protocols used: Diabetes - High Blood Sugar-A-

## 2024-12-09 DIAGNOSIS — K21.9 GASTROESOPHAGEAL REFLUX DISEASE WITHOUT ESOPHAGITIS: ICD-10-CM

## 2025-01-02 ENCOUNTER — OFFICE VISIT (OUTPATIENT)
Dept: CARDIOLOGY | Facility: CLINIC | Age: 75
End: 2025-01-02
Payer: COMMERCIAL

## 2025-01-02 VITALS
HEIGHT: 73 IN | OXYGEN SATURATION: 96 % | TEMPERATURE: 97.6 F | RESPIRATION RATE: 16 BRPM | WEIGHT: 232.9 LBS | DIASTOLIC BLOOD PRESSURE: 69 MMHG | BODY MASS INDEX: 30.87 KG/M2 | HEART RATE: 81 BPM | SYSTOLIC BLOOD PRESSURE: 109 MMHG

## 2025-01-02 DIAGNOSIS — R07.2 PRECORDIAL PAIN: Primary | ICD-10-CM

## 2025-01-02 DIAGNOSIS — I25.10 CORONARY ARTERY DISEASE INVOLVING NATIVE CORONARY ARTERY OF NATIVE HEART WITHOUT ANGINA PECTORIS: ICD-10-CM

## 2025-01-02 DIAGNOSIS — I10 HYPERTENSION, UNSPECIFIED TYPE: ICD-10-CM

## 2025-01-02 RX ORDER — VIBEGRON 75 MG/1
TABLET, FILM COATED ORAL
COMMUNITY
Start: 2024-10-23

## 2025-01-02 RX ORDER — TIZANIDINE 2 MG/1
TABLET ORAL
COMMUNITY
Start: 2024-12-16
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
## 2025-01-10 ENCOUNTER — HOSPITAL ENCOUNTER (OUTPATIENT)
Dept: NUCLEAR MEDICINE | Facility: HOSPITAL | Age: 75
Discharge: HOME | End: 2025-01-10
Attending: INTERNAL MEDICINE
Payer: COMMERCIAL

## 2025-01-10 ENCOUNTER — HOSPITAL ENCOUNTER (OUTPATIENT)
Dept: CARDIOLOGY | Facility: HOSPITAL | Age: 75
Discharge: HOME | End: 2025-01-10
Attending: INTERNAL MEDICINE
Payer: COMMERCIAL

## 2025-01-10 DIAGNOSIS — I25.10 CORONARY ARTERY DISEASE INVOLVING NATIVE CORONARY ARTERY OF NATIVE HEART WITHOUT ANGINA PECTORIS: ICD-10-CM

## 2025-01-10 DIAGNOSIS — R07.2 PRECORDIAL PAIN: ICD-10-CM

## 2025-01-10 LAB
CV STRESS CURRENT BP HE: NORMAL
CV STRESS CURRENT HR HE: 100
CV STRESS CURRENT HR HE: 101
CV STRESS CURRENT HR HE: 109
CV STRESS CURRENT HR HE: 111
CV STRESS CURRENT HR HE: 72
CV STRESS CURRENT HR HE: 72
CV STRESS CURRENT HR HE: 76
CV STRESS CURRENT HR HE: 76
CV STRESS CURRENT HR HE: 77
CV STRESS CURRENT HR HE: 89
CV STRESS CURRENT HR HE: 91
CV STRESS CURRENT HR HE: 92
CV STRESS CURRENT HR HE: 93
CV STRESS CURRENT HR HE: 94
CV STRESS CURRENT HR HE: 95
CV STRESS DEVIATION TIME HE: NORMAL
CV STRESS ECHO PERCENT HR HE: NORMAL
CV STRESS EXERCISE STAGE HE: NORMAL
CV STRESS FINAL RESTING BP HE: NORMAL
CV STRESS FINAL RESTING HR HE: 95
CV STRESS MAX HR HE: 112
CV STRESS MAX TREADMILL GRADE HE: 0
CV STRESS MAX TREADMILL SPEED HE: 0
CV STRESS PEAK DIA BP HE: NORMAL
CV STRESS PEAK SYS BP HE: NORMAL
CV STRESS PHASE HE: NORMAL
CV STRESS PROTOCOL HE: NORMAL
CV STRESS RESTING PT POSITION HE: NORMAL
CV STRESS RESTING PT POSITION HE: NORMAL
CV STRESS ST DEVIATION AMOUNT HE: NORMAL
CV STRESS ST DEVIATION ELEVATION HE: NORMAL
CV STRESS ST EVELATION AMOUNT HE: NORMAL
CV STRESS TEST TYPE HE: NORMAL
CV STRESS TOTAL STAGE TIME MIN 1 HE: NORMAL
RATE PRESSURE PRODUCT: NORMAL
STRESS ECHO BASELINE DIASTOLIC HE: 65
STRESS ECHO BASELINE HR: 80
STRESS ECHO BASELINE SYSTOLIC BP: 114
STRESS ECHO CALCULATED PERCENT HR: 77 %
STRESS ECHO LAST STRESS DIASTOLIC BP: 60
STRESS ECHO LAST STRESS HR: 93
STRESS ECHO LAST STRESS SYSTOLIC BP: 121
STRESS ECHO TARGET HR: 146

## 2025-01-10 PROCEDURE — 93018 CV STRESS TEST I&R ONLY: CPT | Performed by: INTERNAL MEDICINE

## 2025-01-10 PROCEDURE — 93017 CV STRESS TEST TRACING ONLY: CPT

## 2025-01-10 PROCEDURE — 93016 CV STRESS TEST SUPVJ ONLY: CPT | Performed by: INTERNAL MEDICINE

## 2025-01-10 PROCEDURE — 78452 HT MUSCLE IMAGE SPECT MULT: CPT | Mod: 26 | Performed by: INTERNAL MEDICINE

## 2025-01-10 PROCEDURE — 78452 HT MUSCLE IMAGE SPECT MULT: CPT

## 2025-01-10 PROCEDURE — A9500 TC99M SESTAMIBI: HCPCS | Performed by: INTERNAL MEDICINE

## 2025-01-10 PROCEDURE — 343N000001 HC RX 343 MED OP 636: Performed by: INTERNAL MEDICINE

## 2025-01-10 PROCEDURE — 250N000011 HC RX IP 250 OP 636: Performed by: INTERNAL MEDICINE

## 2025-01-10 RX ORDER — ALBUTEROL SULFATE 0.83 MG/ML
2.5 SOLUTION RESPIRATORY (INHALATION)
Status: DISCONTINUED | OUTPATIENT
Start: 2025-01-10 | End: 2025-01-10 | Stop reason: HOSPADM

## 2025-01-10 RX ORDER — CAFFEINE CITRATE 20 MG/ML
60 SOLUTION INTRAVENOUS
Status: DISCONTINUED | OUTPATIENT
Start: 2025-01-10 | End: 2025-01-10 | Stop reason: HOSPADM

## 2025-01-10 RX ORDER — CAFFEINE 200 MG
200 TABLET ORAL
Status: DISCONTINUED | OUTPATIENT
Start: 2025-01-10 | End: 2025-01-10 | Stop reason: HOSPADM

## 2025-01-10 RX ORDER — AMINOPHYLLINE 25 MG/ML
50-100 INJECTION, SOLUTION INTRAVENOUS
Status: DISCONTINUED | OUTPATIENT
Start: 2025-01-10 | End: 2025-01-11 | Stop reason: HOSPADM

## 2025-01-10 RX ORDER — REGADENOSON 0.08 MG/ML
0.4 INJECTION, SOLUTION INTRAVENOUS ONCE
Status: COMPLETED | OUTPATIENT
Start: 2025-01-10 | End: 2025-01-10

## 2025-01-10 RX ADMIN — Medication 9.2 MILLICURIE: at 07:39

## 2025-01-10 RX ADMIN — REGADENOSON 0.4 MG: 0.08 INJECTION, SOLUTION INTRAVENOUS at 08:49

## 2025-01-10 RX ADMIN — Medication 36.4 MILLICURIE: at 10:04

## 2025-01-14 DIAGNOSIS — I25.10 CORONARY ARTERY DISEASE INVOLVING NATIVE CORONARY ARTERY OF NATIVE HEART WITHOUT ANGINA PECTORIS: Primary | ICD-10-CM

## 2025-01-14 DIAGNOSIS — R07.2 PRECORDIAL PAIN: ICD-10-CM

## 2025-02-12 ENCOUNTER — MYC MEDICAL ADVICE (OUTPATIENT)
Dept: INTERNAL MEDICINE | Facility: CLINIC | Age: 75
End: 2025-02-12
Payer: COMMERCIAL

## 2025-02-12 DIAGNOSIS — E11.9 TYPE 2 DIABETES, HBA1C GOAL < 8% (H): ICD-10-CM

## 2025-02-13 RX ORDER — METFORMIN HYDROCHLORIDE 500 MG/1
1000 TABLET, EXTENDED RELEASE ORAL 2 TIMES DAILY WITH MEALS
Qty: 360 TABLET | Refills: 3 | Status: SHIPPED | OUTPATIENT
Start: 2025-02-13

## 2025-04-01 ENCOUNTER — TELEPHONE (OUTPATIENT)
Dept: INTERNAL MEDICINE | Facility: CLINIC | Age: 75
End: 2025-04-01
Payer: COMMERCIAL

## 2025-04-01 NOTE — TELEPHONE ENCOUNTER
April 1, 2025    Home health orders was received via fax for Dr. Kraus.  Patient label was attached to paperwork and placed in provider's inbox to be signed.    Reba Ellis

## 2025-04-02 DIAGNOSIS — E11.9 TYPE 2 DIABETES, HBA1C GOAL < 8% (H): Primary | ICD-10-CM

## 2025-04-03 NOTE — TELEPHONE ENCOUNTER
April 3, 2025    Home health orders was picked up from outbox of Dr. Kraus and sent via fax to [phone].    Reba Ellis

## 2025-04-08 DIAGNOSIS — E11.9 TYPE 2 DIABETES, HBA1C GOAL < 8% (H): Primary | ICD-10-CM

## 2025-05-16 ENCOUNTER — TRANSFERRED RECORDS (OUTPATIENT)
Dept: HEALTH INFORMATION MANAGEMENT | Facility: CLINIC | Age: 75
End: 2025-05-16
Payer: COMMERCIAL

## 2025-05-20 ENCOUNTER — RESULTS FOLLOW-UP (OUTPATIENT)
Dept: INTERNAL MEDICINE | Facility: CLINIC | Age: 75
End: 2025-05-20

## 2025-05-20 ENCOUNTER — OFFICE VISIT (OUTPATIENT)
Dept: INTERNAL MEDICINE | Facility: CLINIC | Age: 75
End: 2025-05-20
Payer: COMMERCIAL

## 2025-05-20 VITALS
TEMPERATURE: 97.1 F | SYSTOLIC BLOOD PRESSURE: 112 MMHG | HEART RATE: 84 BPM | BODY MASS INDEX: 29.69 KG/M2 | OXYGEN SATURATION: 95 % | DIASTOLIC BLOOD PRESSURE: 61 MMHG | WEIGHT: 224 LBS | HEIGHT: 73 IN | RESPIRATION RATE: 13 BRPM

## 2025-05-20 DIAGNOSIS — N40.1 BENIGN PROSTATIC HYPERPLASIA WITH WEAK URINARY STREAM: Chronic | ICD-10-CM

## 2025-05-20 DIAGNOSIS — I25.83 CORONARY ARTERY DISEASE DUE TO LIPID RICH PLAQUE: ICD-10-CM

## 2025-05-20 DIAGNOSIS — E11.9 TYPE 2 DIABETES, HBA1C GOAL < 8% (H): ICD-10-CM

## 2025-05-20 DIAGNOSIS — E78.5 HYPERLIPIDEMIA LDL GOAL <70: ICD-10-CM

## 2025-05-20 DIAGNOSIS — I25.10 CORONARY ARTERY DISEASE DUE TO LIPID RICH PLAQUE: ICD-10-CM

## 2025-05-20 DIAGNOSIS — E11.42 DIABETIC POLYNEUROPATHY ASSOCIATED WITH TYPE 2 DIABETES MELLITUS (H): ICD-10-CM

## 2025-05-20 DIAGNOSIS — G47.33 OBSTRUCTIVE SLEEP APNEA SYNDROME: Chronic | ICD-10-CM

## 2025-05-20 DIAGNOSIS — Z86.0100 HISTORY OF COLONIC POLYPS: ICD-10-CM

## 2025-05-20 DIAGNOSIS — I10 ESSENTIAL HYPERTENSION: ICD-10-CM

## 2025-05-20 DIAGNOSIS — R39.12 BENIGN PROSTATIC HYPERPLASIA WITH WEAK URINARY STREAM: Chronic | ICD-10-CM

## 2025-05-20 DIAGNOSIS — Z79.899 CHRONIC PRESCRIPTION BENZODIAZEPINE USE: Primary | ICD-10-CM

## 2025-05-20 LAB
ALBUMIN SERPL BCG-MCNC: 4.5 G/DL (ref 3.5–5.2)
ALP SERPL-CCNC: 38 U/L (ref 40–150)
ALT SERPL W P-5'-P-CCNC: 19 U/L (ref 0–70)
ANION GAP SERPL CALCULATED.3IONS-SCNC: 15 MMOL/L (ref 7–15)
AST SERPL W P-5'-P-CCNC: 19 U/L (ref 0–45)
BILIRUB SERPL-MCNC: 0.4 MG/DL
BUN SERPL-MCNC: 18.1 MG/DL (ref 8–23)
CALCIUM SERPL-MCNC: 10.3 MG/DL (ref 8.8–10.4)
CHLORIDE SERPL-SCNC: 98 MMOL/L (ref 98–107)
CHOLEST SERPL-MCNC: 137 MG/DL
CREAT SERPL-MCNC: 0.86 MG/DL (ref 0.67–1.17)
EGFRCR SERPLBLD CKD-EPI 2021: 90 ML/MIN/1.73M2
ERYTHROCYTE [DISTWIDTH] IN BLOOD BY AUTOMATED COUNT: 12.7 % (ref 10–15)
EST. AVERAGE GLUCOSE BLD GHB EST-MCNC: 180 MG/DL
FASTING STATUS PATIENT QL REPORTED: ABNORMAL
FASTING STATUS PATIENT QL REPORTED: ABNORMAL
GLUCOSE SERPL-MCNC: 153 MG/DL (ref 70–99)
HBA1C MFR BLD: 7.9 % (ref 0–5.6)
HCO3 SERPL-SCNC: 22 MMOL/L (ref 22–29)
HCT VFR BLD AUTO: 41.9 % (ref 40–53)
HDLC SERPL-MCNC: 34 MG/DL
HGB BLD-MCNC: 13.8 G/DL (ref 13.3–17.7)
LDLC SERPL CALC-MCNC: 68 MG/DL
MCH RBC QN AUTO: 29.1 PG (ref 26.5–33)
MCHC RBC AUTO-ENTMCNC: 32.9 G/DL (ref 31.5–36.5)
MCV RBC AUTO: 88 FL (ref 78–100)
NONHDLC SERPL-MCNC: 103 MG/DL
PLATELET # BLD AUTO: 321 10E3/UL (ref 150–450)
POTASSIUM SERPL-SCNC: 4 MMOL/L (ref 3.4–5.3)
PROT SERPL-MCNC: 7.4 G/DL (ref 6.4–8.3)
RBC # BLD AUTO: 4.75 10E6/UL (ref 4.4–5.9)
SODIUM SERPL-SCNC: 135 MMOL/L (ref 135–145)
TRIGL SERPL-MCNC: 173 MG/DL
WBC # BLD AUTO: 8.1 10E3/UL (ref 4–11)

## 2025-05-20 PROCEDURE — 3078F DIAST BP <80 MM HG: CPT | Performed by: INTERNAL MEDICINE

## 2025-05-20 PROCEDURE — 1125F AMNT PAIN NOTED PAIN PRSNT: CPT | Performed by: INTERNAL MEDICINE

## 2025-05-20 PROCEDURE — 80061 LIPID PANEL: CPT | Performed by: INTERNAL MEDICINE

## 2025-05-20 PROCEDURE — 83036 HEMOGLOBIN GLYCOSYLATED A1C: CPT | Performed by: INTERNAL MEDICINE

## 2025-05-20 PROCEDURE — G2211 COMPLEX E/M VISIT ADD ON: HCPCS | Performed by: INTERNAL MEDICINE

## 2025-05-20 PROCEDURE — 36415 COLL VENOUS BLD VENIPUNCTURE: CPT | Performed by: INTERNAL MEDICINE

## 2025-05-20 PROCEDURE — 99214 OFFICE O/P EST MOD 30 MIN: CPT | Performed by: INTERNAL MEDICINE

## 2025-05-20 PROCEDURE — 85027 COMPLETE CBC AUTOMATED: CPT | Performed by: INTERNAL MEDICINE

## 2025-05-20 PROCEDURE — 80053 COMPREHEN METABOLIC PANEL: CPT | Performed by: INTERNAL MEDICINE

## 2025-05-20 PROCEDURE — 3074F SYST BP LT 130 MM HG: CPT | Performed by: INTERNAL MEDICINE

## 2025-05-20 RX ORDER — LORAZEPAM 1 MG/1
1 TABLET ORAL DAILY PRN
Qty: 60 TABLET | Refills: 3 | Status: SHIPPED | OUTPATIENT
Start: 2025-05-20

## 2025-05-20 RX ORDER — CHLORTHALIDONE 25 MG/1
25 TABLET ORAL DAILY
Qty: 90 TABLET | Refills: 3 | Status: SHIPPED | OUTPATIENT
Start: 2025-05-20

## 2025-05-20 RX ORDER — CHLORTHALIDONE 25 MG/1
25 TABLET ORAL DAILY
Qty: 90 TABLET | Refills: 3 | Status: SHIPPED | OUTPATIENT
Start: 2025-05-20 | End: 2025-05-20

## 2025-05-20 RX ORDER — ASPIRIN 81 MG/1
81 TABLET, COATED ORAL DAILY
Status: SHIPPED
Start: 2025-05-20

## 2025-05-20 ASSESSMENT — PAIN SCALES - GENERAL: PAINLEVEL_OUTOF10: MODERATE PAIN (5)
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
## 2025-05-29 DIAGNOSIS — I25.10 CORONARY ARTERY DISEASE INVOLVING NATIVE CORONARY ARTERY OF NATIVE HEART WITHOUT ANGINA PECTORIS: ICD-10-CM

## 2025-06-02 ENCOUNTER — TRANSFERRED RECORDS (OUTPATIENT)
Dept: HEALTH INFORMATION MANAGEMENT | Facility: CLINIC | Age: 75
End: 2025-06-02
Payer: COMMERCIAL

## 2025-06-02 DIAGNOSIS — E11.9 TYPE 2 DIABETES, HBA1C GOAL < 8% (H): ICD-10-CM

## 2025-06-02 RX ORDER — CLOPIDOGREL BISULFATE 75 MG/1
75 TABLET ORAL
Qty: 90 TABLET | Refills: 1 | Status: SHIPPED | OUTPATIENT
Start: 2025-06-02

## 2025-06-18 ENCOUNTER — TRANSFERRED RECORDS (OUTPATIENT)
Dept: HEALTH INFORMATION MANAGEMENT | Facility: CLINIC | Age: 75
End: 2025-06-18
Payer: COMMERCIAL

## 2025-07-09 ENCOUNTER — OFFICE VISIT (OUTPATIENT)
Dept: INTERNAL MEDICINE | Facility: CLINIC | Age: 75
End: 2025-07-09
Payer: COMMERCIAL

## 2025-07-09 VITALS
HEIGHT: 73 IN | OXYGEN SATURATION: 96 % | HEART RATE: 91 BPM | BODY MASS INDEX: 28.49 KG/M2 | SYSTOLIC BLOOD PRESSURE: 120 MMHG | DIASTOLIC BLOOD PRESSURE: 74 MMHG | RESPIRATION RATE: 15 BRPM | TEMPERATURE: 97.4 F | WEIGHT: 215 LBS

## 2025-07-09 DIAGNOSIS — E03.9 ACQUIRED HYPOTHYROIDISM: ICD-10-CM

## 2025-07-09 DIAGNOSIS — E66.01 MORBID (SEVERE) OBESITY DUE TO EXCESS CALORIES (H): ICD-10-CM

## 2025-07-09 DIAGNOSIS — E11.42 DIABETIC POLYNEUROPATHY ASSOCIATED WITH TYPE 2 DIABETES MELLITUS (H): ICD-10-CM

## 2025-07-09 DIAGNOSIS — I10 ESSENTIAL HYPERTENSION: Chronic | ICD-10-CM

## 2025-07-09 DIAGNOSIS — Z95.5 HISTORY OF CORONARY ARTERY STENT PLACEMENT: Primary | ICD-10-CM

## 2025-07-09 DIAGNOSIS — N40.1 BENIGN PROSTATIC HYPERPLASIA WITH WEAK URINARY STREAM: Chronic | ICD-10-CM

## 2025-07-09 DIAGNOSIS — R13.12 OROPHARYNGEAL DYSPHAGIA: ICD-10-CM

## 2025-07-09 DIAGNOSIS — Z23 NEED FOR VACCINATION: ICD-10-CM

## 2025-07-09 DIAGNOSIS — R39.12 BENIGN PROSTATIC HYPERPLASIA WITH WEAK URINARY STREAM: Chronic | ICD-10-CM

## 2025-07-09 DIAGNOSIS — K21.00 GASTROESOPHAGEAL REFLUX DISEASE WITH ESOPHAGITIS WITHOUT HEMORRHAGE: Chronic | ICD-10-CM

## 2025-07-09 DIAGNOSIS — G47.33 OBSTRUCTIVE SLEEP APNEA SYNDROME: Chronic | ICD-10-CM

## 2025-07-09 DIAGNOSIS — E11.9 TYPE 2 DIABETES, HBA1C GOAL < 8% (H): ICD-10-CM

## 2025-07-09 DIAGNOSIS — E78.5 HYPERLIPIDEMIA LDL GOAL <70: ICD-10-CM

## 2025-07-09 LAB
ALBUMIN SERPL BCG-MCNC: 4.7 G/DL (ref 3.5–5.2)
ALP SERPL-CCNC: 41 U/L (ref 40–150)
ALT SERPL W P-5'-P-CCNC: 18 U/L (ref 0–70)
ANION GAP SERPL CALCULATED.3IONS-SCNC: 13 MMOL/L (ref 7–15)
AST SERPL W P-5'-P-CCNC: 23 U/L (ref 0–45)
BILIRUB SERPL-MCNC: 0.7 MG/DL
BUN SERPL-MCNC: 27 MG/DL (ref 8–23)
CALCIUM SERPL-MCNC: 10.5 MG/DL (ref 8.8–10.4)
CHLORIDE SERPL-SCNC: 96 MMOL/L (ref 98–107)
CREAT SERPL-MCNC: 0.89 MG/DL (ref 0.67–1.17)
EGFRCR SERPLBLD CKD-EPI 2021: 89 ML/MIN/1.73M2
EST. AVERAGE GLUCOSE BLD GHB EST-MCNC: 203 MG/DL
GLUCOSE SERPL-MCNC: 148 MG/DL (ref 70–99)
HBA1C MFR BLD: 8.7 % (ref 0–5.6)
HCO3 SERPL-SCNC: 26 MMOL/L (ref 22–29)
POTASSIUM SERPL-SCNC: 3.3 MMOL/L (ref 3.4–5.3)
PROT SERPL-MCNC: 7.7 G/DL (ref 6.4–8.3)
SODIUM SERPL-SCNC: 135 MMOL/L (ref 135–145)
TSH SERPL DL<=0.005 MIU/L-ACNC: 2.21 UIU/ML (ref 0.3–4.2)

## 2025-07-09 PROCEDURE — 80053 COMPREHEN METABOLIC PANEL: CPT | Performed by: INTERNAL MEDICINE

## 2025-07-09 PROCEDURE — 83036 HEMOGLOBIN GLYCOSYLATED A1C: CPT | Performed by: INTERNAL MEDICINE

## 2025-07-09 PROCEDURE — 99214 OFFICE O/P EST MOD 30 MIN: CPT | Performed by: INTERNAL MEDICINE

## 2025-07-09 PROCEDURE — 3052F HG A1C>EQUAL 8.0%<EQUAL 9.0%: CPT | Performed by: INTERNAL MEDICINE

## 2025-07-09 PROCEDURE — G2211 COMPLEX E/M VISIT ADD ON: HCPCS | Performed by: INTERNAL MEDICINE

## 2025-07-09 PROCEDURE — 1125F AMNT PAIN NOTED PAIN PRSNT: CPT | Performed by: INTERNAL MEDICINE

## 2025-07-09 PROCEDURE — 3074F SYST BP LT 130 MM HG: CPT | Performed by: INTERNAL MEDICINE

## 2025-07-09 PROCEDURE — 84443 ASSAY THYROID STIM HORMONE: CPT | Performed by: INTERNAL MEDICINE

## 2025-07-09 PROCEDURE — 3078F DIAST BP <80 MM HG: CPT | Performed by: INTERNAL MEDICINE

## 2025-07-09 PROCEDURE — 36415 COLL VENOUS BLD VENIPUNCTURE: CPT | Performed by: INTERNAL MEDICINE

## 2025-07-09 RX ORDER — ONDANSETRON 4 MG/1
4 TABLET, ORALLY DISINTEGRATING ORAL EVERY 8 HOURS PRN
Qty: 30 TABLET | Refills: 3 | Status: SHIPPED | OUTPATIENT
Start: 2025-07-09

## 2025-07-09 ASSESSMENT — PAIN SCALES - GENERAL: PAINLEVEL_OUTOF10: MILD PAIN (3)
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
## 2025-07-10 ENCOUNTER — PATIENT OUTREACH (OUTPATIENT)
Dept: CARE COORDINATION | Facility: CLINIC | Age: 75
End: 2025-07-10
Payer: COMMERCIAL

## 2025-07-14 ENCOUNTER — PATIENT OUTREACH (OUTPATIENT)
Dept: CARE COORDINATION | Facility: CLINIC | Age: 75
End: 2025-07-14
Payer: COMMERCIAL

## 2025-07-16 ENCOUNTER — TRANSFERRED RECORDS (OUTPATIENT)
Dept: HEALTH INFORMATION MANAGEMENT | Facility: CLINIC | Age: 75
End: 2025-07-16
Payer: COMMERCIAL

## 2025-07-24 ENCOUNTER — TELEPHONE (OUTPATIENT)
Dept: INTERNAL MEDICINE | Facility: CLINIC | Age: 75
End: 2025-07-24
Payer: COMMERCIAL

## 2025-07-24 RX ORDER — TIRZEPATIDE 2.5 MG/.5ML
INJECTION, SOLUTION SUBCUTANEOUS
COMMUNITY
Start: 2025-07-14

## 2025-07-24 NOTE — TELEPHONE ENCOUNTER
July 24, 2025    [facility] Request to Hold Blood Thinning Med was received via fax for Dr. Karus.  Patient label was attached to paperwork and placed in the inbox for  Dr. Campa (covering provider) to review and sign.    Reba Ellis

## 2025-07-28 ENCOUNTER — HOSPITAL ENCOUNTER (OUTPATIENT)
Dept: CT IMAGING | Facility: HOSPITAL | Age: 75
Discharge: HOME | End: 2025-07-28
Attending: PHYSICIAN ASSISTANT | Admitting: PHYSICIAN ASSISTANT
Payer: COMMERCIAL

## 2025-07-28 DIAGNOSIS — J38.7 LESION OF LARYNX: ICD-10-CM

## 2025-07-28 PROCEDURE — 250N000009 HC RX 250: Performed by: PHYSICIAN ASSISTANT

## 2025-07-28 PROCEDURE — 250N000011 HC RX IP 250 OP 636: Performed by: PHYSICIAN ASSISTANT

## 2025-07-28 PROCEDURE — 70491 CT SOFT TISSUE NECK W/DYE: CPT

## 2025-07-28 RX ORDER — IOPAMIDOL 755 MG/ML
90 INJECTION, SOLUTION INTRAVASCULAR ONCE
Status: COMPLETED | OUTPATIENT
Start: 2025-07-28 | End: 2025-07-28

## 2025-07-28 RX ADMIN — IOPAMIDOL 90 ML: 755 INJECTION, SOLUTION INTRAVENOUS at 09:11

## 2025-07-28 RX ADMIN — SODIUM CHLORIDE 100 ML: 9 INJECTION, SOLUTION INTRAVENOUS at 09:13

## 2025-07-28 NOTE — TELEPHONE ENCOUNTER
Reason for Visit: Lesion of larynx referred by Renny Almonte PA in [facility]    Date of Appointment: 8/1/2025   Notes Status Description   Office Notes from Referring Provider [facility]   7/25/2025 OV:  Renny Almonte PA    Imaging     CT Epic/ PACS 7/28/2025 CT neck

## 2025-07-29 ENCOUNTER — MYC MEDICAL ADVICE (OUTPATIENT)
Dept: OTOLARYNGOLOGY | Facility: CLINIC | Age: 75
End: 2025-07-29
Payer: COMMERCIAL

## 2025-07-29 DIAGNOSIS — R07.0 THROAT PAIN: Primary | ICD-10-CM

## 2025-07-30 RX ORDER — DIPHENHYDRAMINE HYDROCHLORIDE AND LIDOCAINE HYDROCHLORIDE AND ALUMINUM HYDROXIDE AND MAGNESIUM HYDRO
5-10 KIT EVERY 6 HOURS PRN
Qty: 120 ML | Refills: 1 | Status: SHIPPED | OUTPATIENT
Start: 2025-07-30

## 2025-08-01 ENCOUNTER — PRE VISIT (OUTPATIENT)
Dept: OTOLARYNGOLOGY | Facility: CLINIC | Age: 75
End: 2025-08-01

## 2025-08-04 ENCOUNTER — OFFICE VISIT (OUTPATIENT)
Dept: OTOLARYNGOLOGY | Facility: CLINIC | Age: 75
End: 2025-08-04
Payer: COMMERCIAL

## 2025-08-04 DIAGNOSIS — C77.0 SECONDARY MALIGNANCY OF LYMPH NODES OF HEAD, FACE AND NECK (H): ICD-10-CM

## 2025-08-04 DIAGNOSIS — C01 SQUAMOUS CELL CARCINOMA OF BASE OF TONGUE (H): Primary | ICD-10-CM

## 2025-08-04 PROCEDURE — 88341 IMHCHEM/IMCYTCHM EA ADD ANTB: CPT | Mod: 26 | Performed by: PATHOLOGY

## 2025-08-04 PROCEDURE — 88172 CYTP DX EVAL FNA 1ST EA SITE: CPT | Mod: 26 | Performed by: PATHOLOGY

## 2025-08-04 PROCEDURE — 88305 TISSUE EXAM BY PATHOLOGIST: CPT | Mod: 26 | Performed by: PATHOLOGY

## 2025-08-04 PROCEDURE — 88173 CYTOPATH EVAL FNA REPORT: CPT | Mod: 26 | Performed by: PATHOLOGY

## 2025-08-04 PROCEDURE — 88173 CYTOPATH EVAL FNA REPORT: CPT | Mod: TC | Performed by: OTOLARYNGOLOGY

## 2025-08-04 PROCEDURE — 10021 FNA BX W/O IMG GDN 1ST LES: CPT | Mod: GC | Performed by: PATHOLOGY

## 2025-08-04 PROCEDURE — 88342 IMHCHEM/IMCYTCHM 1ST ANTB: CPT | Mod: 26 | Performed by: PATHOLOGY

## 2025-08-05 ENCOUNTER — PATIENT OUTREACH (OUTPATIENT)
Dept: ONCOLOGY | Facility: CLINIC | Age: 75
End: 2025-08-05

## 2025-08-05 ENCOUNTER — OFFICE VISIT (OUTPATIENT)
Dept: AUDIOLOGY | Facility: CLINIC | Age: 75
End: 2025-08-05
Attending: OTOLARYNGOLOGY
Payer: COMMERCIAL

## 2025-08-05 DIAGNOSIS — C01 SQUAMOUS CELL CARCINOMA OF BASE OF TONGUE (H): ICD-10-CM

## 2025-08-05 DIAGNOSIS — H90.3 ASYMMETRICAL SENSORINEURAL HEARING LOSS: Primary | ICD-10-CM

## 2025-08-06 ENCOUNTER — PATIENT OUTREACH (OUTPATIENT)
Dept: OTOLARYNGOLOGY | Facility: CLINIC | Age: 75
End: 2025-08-06
Payer: COMMERCIAL

## 2025-08-06 LAB
PATH REPORT.COMMENTS IMP SPEC: ABNORMAL
PATH REPORT.COMMENTS IMP SPEC: YES
PATH REPORT.FINAL DX SPEC: ABNORMAL
PATH REPORT.GROSS SPEC: ABNORMAL
PATH REPORT.MICROSCOPIC SPEC OTHER STN: ABNORMAL
PATH REPORT.RELEVANT HX SPEC: ABNORMAL

## 2025-08-07 ENCOUNTER — HOSPITAL ENCOUNTER (OUTPATIENT)
Dept: PET IMAGING | Facility: HOSPITAL | Age: 75
Discharge: HOME | End: 2025-08-07
Attending: OTOLARYNGOLOGY
Payer: COMMERCIAL

## 2025-08-07 DIAGNOSIS — C01 SQUAMOUS CELL CARCINOMA OF BASE OF TONGUE (H): ICD-10-CM

## 2025-08-07 DIAGNOSIS — C77.0 SECONDARY MALIGNANCY OF LYMPH NODES OF HEAD, FACE AND NECK (H): ICD-10-CM

## 2025-08-07 LAB — GLUCOSE BLDC GLUCOMTR-MCNC: 152 MG/DL (ref 70–99)

## 2025-08-07 PROCEDURE — 78816 PET IMAGE W/CT FULL BODY: CPT | Mod: PI

## 2025-08-07 PROCEDURE — 82962 GLUCOSE BLOOD TEST: CPT

## 2025-08-07 PROCEDURE — A9552 F18 FDG: HCPCS | Performed by: OTOLARYNGOLOGY

## 2025-08-07 PROCEDURE — 343N000001 HC RX 343 MED OP 636: Performed by: OTOLARYNGOLOGY

## 2025-08-07 RX ORDER — FLUDEOXYGLUCOSE F 18 200 MCI/ML
7-18 INJECTION, SOLUTION INTRAVENOUS ONCE
Status: COMPLETED | OUTPATIENT
Start: 2025-08-07 | End: 2025-08-07

## 2025-08-07 RX ADMIN — FLUDEOXYGLUCOSE F 18 12.8 MILLICURIE: 200 INJECTION, SOLUTION INTRAVENOUS at 08:45

## 2025-08-08 ENCOUNTER — TUMOR CONFERENCE (OUTPATIENT)
Dept: ONCOLOGY | Facility: CLINIC | Age: 75
End: 2025-08-08
Payer: COMMERCIAL

## 2025-08-11 ENCOUNTER — PATIENT OUTREACH (OUTPATIENT)
Dept: OTOLARYNGOLOGY | Facility: CLINIC | Age: 75
End: 2025-08-11
Payer: COMMERCIAL

## 2025-08-11 ENCOUNTER — TRANSFERRED RECORDS (OUTPATIENT)
Dept: HEALTH INFORMATION MANAGEMENT | Facility: CLINIC | Age: 75
End: 2025-08-11
Payer: COMMERCIAL

## 2025-08-19 ENCOUNTER — PRE VISIT (OUTPATIENT)
Dept: RADIATION ONCOLOGY | Facility: CLINIC | Age: 75
End: 2025-08-19
Payer: COMMERCIAL

## 2025-08-19 ENCOUNTER — PRE VISIT (OUTPATIENT)
Dept: ONCOLOGY | Facility: CLINIC | Age: 75
End: 2025-08-19
Payer: COMMERCIAL

## 2025-08-19 ENCOUNTER — ONCOLOGY VISIT (OUTPATIENT)
Dept: ONCOLOGY | Facility: CLINIC | Age: 75
End: 2025-08-19
Attending: OTOLARYNGOLOGY
Payer: COMMERCIAL

## 2025-08-19 ENCOUNTER — OFFICE VISIT (OUTPATIENT)
Dept: RADIATION ONCOLOGY | Facility: CLINIC | Age: 75
End: 2025-08-19
Attending: OTOLARYNGOLOGY
Payer: COMMERCIAL

## 2025-08-19 VITALS
HEART RATE: 97 BPM | DIASTOLIC BLOOD PRESSURE: 74 MMHG | WEIGHT: 227.9 LBS | SYSTOLIC BLOOD PRESSURE: 124 MMHG | OXYGEN SATURATION: 96 % | RESPIRATION RATE: 16 BRPM | TEMPERATURE: 96.9 F | HEIGHT: 72 IN | BODY MASS INDEX: 30.87 KG/M2

## 2025-08-19 DIAGNOSIS — C01 SQUAMOUS CELL CARCINOMA OF BASE OF TONGUE (H): ICD-10-CM

## 2025-08-19 DIAGNOSIS — C77.0 SECONDARY MALIGNANCY OF LYMPH NODES OF HEAD, FACE AND NECK (H): Primary | ICD-10-CM

## 2025-08-19 DIAGNOSIS — C77.0 SECONDARY MALIGNANCY OF LYMPH NODES OF HEAD, FACE AND NECK (H): ICD-10-CM

## 2025-08-19 PROCEDURE — G0463 HOSPITAL OUTPT CLINIC VISIT: HCPCS | Performed by: RADIOLOGY

## 2025-08-19 PROCEDURE — G0463 HOSPITAL OUTPT CLINIC VISIT: HCPCS | Performed by: INTERNAL MEDICINE

## 2025-08-19 RX ORDER — LANCETS
EACH MISCELLANEOUS
COMMUNITY
Start: 2025-07-26

## 2025-08-19 RX ORDER — GABAPENTIN 300 MG/1
900 CAPSULE ORAL 3 TIMES DAILY
COMMUNITY

## 2025-08-19 ASSESSMENT — PAIN SCALES - GENERAL: PAINLEVEL_OUTOF10: NO PAIN (0)

## 2025-08-20 ENCOUNTER — HOSPITAL ENCOUNTER (OUTPATIENT)
Dept: CT IMAGING | Facility: CLINIC | Age: 75
Discharge: HOME | End: 2025-08-20
Attending: RADIOLOGY
Payer: COMMERCIAL

## 2025-08-20 ENCOUNTER — PATIENT OUTREACH (OUTPATIENT)
Dept: CARE COORDINATION | Facility: CLINIC | Age: 75
End: 2025-08-20
Payer: COMMERCIAL

## 2025-08-20 ENCOUNTER — HOSPITAL ENCOUNTER (OUTPATIENT)
Facility: AMBULATORY SURGERY CENTER | Age: 75
End: 2025-08-20
Attending: RADIOLOGY
Payer: COMMERCIAL

## 2025-08-20 ENCOUNTER — OFFICE VISIT (OUTPATIENT)
Dept: RADIATION ONCOLOGY | Facility: CLINIC | Age: 75
End: 2025-08-20
Attending: OTOLARYNGOLOGY
Payer: COMMERCIAL

## 2025-08-20 DIAGNOSIS — C01 SQUAMOUS CELL CARCINOMA OF BASE OF TONGUE (H): ICD-10-CM

## 2025-08-20 DIAGNOSIS — C01 SQUAMOUS CELL CARCINOMA OF BASE OF TONGUE (H): Primary | ICD-10-CM

## 2025-08-20 PROCEDURE — 77014 CT THERAPY CORRELATE: CPT

## 2025-08-20 PROCEDURE — 77334 RADIATION TREATMENT AID(S): CPT | Performed by: RADIOLOGY

## 2025-08-20 PROCEDURE — 255N000002 HC RX 255 OP 636: Performed by: RADIOLOGY

## 2025-08-20 PROCEDURE — 77334 RADIATION TREATMENT AID(S): CPT | Mod: 26 | Performed by: RADIOLOGY

## 2025-08-20 PROCEDURE — 77470 SPECIAL RADIATION TREATMENT: CPT | Performed by: RADIOLOGY

## 2025-08-20 PROCEDURE — 77470 SPECIAL RADIATION TREATMENT: CPT | Mod: 26 | Performed by: RADIOLOGY

## 2025-08-20 RX ORDER — HEPARIN SODIUM,PORCINE 10 UNIT/ML
5-20 VIAL (ML) INTRAVENOUS DAILY PRN
OUTPATIENT
Start: 2025-09-30

## 2025-08-20 RX ORDER — DIPHENHYDRAMINE HYDROCHLORIDE 50 MG/ML
25 INJECTION, SOLUTION INTRAMUSCULAR; INTRAVENOUS
Start: 2025-09-23

## 2025-08-20 RX ORDER — DIPHENHYDRAMINE HYDROCHLORIDE 50 MG/ML
50 INJECTION, SOLUTION INTRAMUSCULAR; INTRAVENOUS
Start: 2025-10-14

## 2025-08-20 RX ORDER — ALBUTEROL SULFATE 90 UG/1
1-2 INHALANT RESPIRATORY (INHALATION)
Start: 2025-09-30

## 2025-08-20 RX ORDER — DIPHENHYDRAMINE HYDROCHLORIDE 50 MG/ML
25 INJECTION, SOLUTION INTRAMUSCULAR; INTRAVENOUS
Start: 2025-09-30

## 2025-08-20 RX ORDER — DIPHENHYDRAMINE HYDROCHLORIDE 50 MG/ML
50 INJECTION, SOLUTION INTRAMUSCULAR; INTRAVENOUS
Start: 2025-09-16

## 2025-08-20 RX ORDER — HEPARIN SODIUM (PORCINE) LOCK FLUSH IV SOLN 100 UNIT/ML 100 UNIT/ML
5 SOLUTION INTRAVENOUS
OUTPATIENT
Start: 2025-10-07

## 2025-08-20 RX ORDER — MEPERIDINE HYDROCHLORIDE 25 MG/ML
25 INJECTION INTRAMUSCULAR; INTRAVENOUS; SUBCUTANEOUS
OUTPATIENT
Start: 2025-09-09

## 2025-08-20 RX ORDER — HEPARIN SODIUM,PORCINE 10 UNIT/ML
5-20 VIAL (ML) INTRAVENOUS DAILY PRN
OUTPATIENT
Start: 2025-09-09

## 2025-08-20 RX ORDER — MEPERIDINE HYDROCHLORIDE 25 MG/ML
25 INJECTION INTRAMUSCULAR; INTRAVENOUS; SUBCUTANEOUS
OUTPATIENT
Start: 2025-09-23

## 2025-08-20 RX ORDER — PALONOSETRON 0.05 MG/ML
0.25 INJECTION, SOLUTION INTRAVENOUS ONCE
OUTPATIENT
Start: 2025-10-14

## 2025-08-20 RX ORDER — LORAZEPAM 2 MG/ML
0.5 INJECTION INTRAMUSCULAR EVERY 4 HOURS PRN
OUTPATIENT
Start: 2025-09-23

## 2025-08-20 RX ORDER — HEPARIN SODIUM,PORCINE 10 UNIT/ML
5-20 VIAL (ML) INTRAVENOUS DAILY PRN
OUTPATIENT
Start: 2025-09-23

## 2025-08-20 RX ORDER — DEXTROSE, SODIUM CHLORIDE, AND POTASSIUM CHLORIDE 5; .45; .15 G/100ML; G/100ML; G/100ML
2000 INJECTION INTRAVENOUS ONCE
Start: 2025-09-09 | End: 2025-09-08

## 2025-08-20 RX ORDER — METHYLPREDNISOLONE SODIUM SUCCINATE 40 MG/ML
40 INJECTION INTRAMUSCULAR; INTRAVENOUS
Start: 2025-09-30

## 2025-08-20 RX ORDER — ALBUTEROL SULFATE 0.83 MG/ML
2.5 SOLUTION RESPIRATORY (INHALATION)
OUTPATIENT
Start: 2025-10-14

## 2025-08-20 RX ORDER — HEPARIN SODIUM (PORCINE) LOCK FLUSH IV SOLN 100 UNIT/ML 100 UNIT/ML
5 SOLUTION INTRAVENOUS
OUTPATIENT
Start: 2025-09-16

## 2025-08-20 RX ORDER — MEPERIDINE HYDROCHLORIDE 25 MG/ML
25 INJECTION INTRAMUSCULAR; INTRAVENOUS; SUBCUTANEOUS
OUTPATIENT
Start: 2025-09-30

## 2025-08-20 RX ORDER — PALONOSETRON 0.05 MG/ML
0.25 INJECTION, SOLUTION INTRAVENOUS ONCE
OUTPATIENT
Start: 2025-10-07

## 2025-08-20 RX ORDER — DIPHENHYDRAMINE HYDROCHLORIDE 50 MG/ML
25 INJECTION, SOLUTION INTRAMUSCULAR; INTRAVENOUS
Start: 2025-10-07

## 2025-08-20 RX ORDER — ALBUTEROL SULFATE 0.83 MG/ML
2.5 SOLUTION RESPIRATORY (INHALATION)
OUTPATIENT
Start: 2025-10-07

## 2025-08-20 RX ORDER — METHYLPREDNISOLONE SODIUM SUCCINATE 40 MG/ML
40 INJECTION INTRAMUSCULAR; INTRAVENOUS
Start: 2025-10-14

## 2025-08-20 RX ORDER — EPINEPHRINE 1 MG/ML
0.3 INJECTION, SOLUTION INTRAMUSCULAR; SUBCUTANEOUS EVERY 5 MIN PRN
OUTPATIENT
Start: 2025-09-16

## 2025-08-20 RX ORDER — HEPARIN SODIUM (PORCINE) LOCK FLUSH IV SOLN 100 UNIT/ML 100 UNIT/ML
5 SOLUTION INTRAVENOUS
OUTPATIENT
Start: 2025-09-30

## 2025-08-20 RX ORDER — DEXTROSE, SODIUM CHLORIDE, AND POTASSIUM CHLORIDE 5; .45; .15 G/100ML; G/100ML; G/100ML
2000 INJECTION INTRAVENOUS ONCE
Start: 2025-09-30 | End: 2025-09-29

## 2025-08-20 RX ORDER — PALONOSETRON 0.05 MG/ML
0.25 INJECTION, SOLUTION INTRAVENOUS ONCE
OUTPATIENT
Start: 2025-09-23

## 2025-08-20 RX ORDER — HEPARIN SODIUM (PORCINE) LOCK FLUSH IV SOLN 100 UNIT/ML 100 UNIT/ML
5 SOLUTION INTRAVENOUS
OUTPATIENT
Start: 2025-09-09

## 2025-08-20 RX ORDER — DIPHENHYDRAMINE HYDROCHLORIDE 50 MG/ML
25 INJECTION, SOLUTION INTRAMUSCULAR; INTRAVENOUS
Start: 2025-10-14

## 2025-08-20 RX ORDER — DIPHENHYDRAMINE HYDROCHLORIDE 50 MG/ML
50 INJECTION, SOLUTION INTRAMUSCULAR; INTRAVENOUS
Start: 2025-10-07

## 2025-08-20 RX ORDER — HEPARIN SODIUM (PORCINE) LOCK FLUSH IV SOLN 100 UNIT/ML 100 UNIT/ML
5 SOLUTION INTRAVENOUS
OUTPATIENT
Start: 2025-10-14

## 2025-08-20 RX ORDER — LORAZEPAM 2 MG/ML
0.5 INJECTION INTRAMUSCULAR EVERY 4 HOURS PRN
OUTPATIENT
Start: 2025-09-09

## 2025-08-20 RX ORDER — EPINEPHRINE 1 MG/ML
0.3 INJECTION, SOLUTION INTRAMUSCULAR; SUBCUTANEOUS EVERY 5 MIN PRN
OUTPATIENT
Start: 2025-09-09

## 2025-08-20 RX ORDER — ALBUTEROL SULFATE 0.83 MG/ML
2.5 SOLUTION RESPIRATORY (INHALATION)
OUTPATIENT
Start: 2025-09-16

## 2025-08-20 RX ORDER — METHYLPREDNISOLONE SODIUM SUCCINATE 40 MG/ML
40 INJECTION INTRAMUSCULAR; INTRAVENOUS
Start: 2025-09-16

## 2025-08-20 RX ORDER — HEPARIN SODIUM (PORCINE) LOCK FLUSH IV SOLN 100 UNIT/ML 100 UNIT/ML
5 SOLUTION INTRAVENOUS
OUTPATIENT
Start: 2025-09-23

## 2025-08-20 RX ORDER — EPINEPHRINE 1 MG/ML
0.3 INJECTION, SOLUTION INTRAMUSCULAR; SUBCUTANEOUS EVERY 5 MIN PRN
OUTPATIENT
Start: 2025-09-30

## 2025-08-20 RX ORDER — MEPERIDINE HYDROCHLORIDE 25 MG/ML
25 INJECTION INTRAMUSCULAR; INTRAVENOUS; SUBCUTANEOUS
OUTPATIENT
Start: 2025-10-07

## 2025-08-20 RX ORDER — ALBUTEROL SULFATE 90 UG/1
1-2 INHALANT RESPIRATORY (INHALATION)
Start: 2025-09-09

## 2025-08-20 RX ORDER — METHYLPREDNISOLONE SODIUM SUCCINATE 40 MG/ML
40 INJECTION INTRAMUSCULAR; INTRAVENOUS
Start: 2025-10-07

## 2025-08-20 RX ORDER — LORAZEPAM 2 MG/ML
0.5 INJECTION INTRAMUSCULAR EVERY 4 HOURS PRN
OUTPATIENT
Start: 2025-09-30

## 2025-08-20 RX ORDER — HEPARIN SODIUM,PORCINE 10 UNIT/ML
5-20 VIAL (ML) INTRAVENOUS DAILY PRN
OUTPATIENT
Start: 2025-10-07

## 2025-08-20 RX ORDER — DEXTROSE, SODIUM CHLORIDE, AND POTASSIUM CHLORIDE 5; .45; .15 G/100ML; G/100ML; G/100ML
2000 INJECTION INTRAVENOUS ONCE
Start: 2025-09-16 | End: 2025-09-15

## 2025-08-20 RX ORDER — HEPARIN SODIUM,PORCINE 10 UNIT/ML
5-20 VIAL (ML) INTRAVENOUS DAILY PRN
OUTPATIENT
Start: 2025-09-16

## 2025-08-20 RX ORDER — PALONOSETRON 0.05 MG/ML
0.25 INJECTION, SOLUTION INTRAVENOUS ONCE
OUTPATIENT
Start: 2025-09-09

## 2025-08-20 RX ORDER — DIPHENHYDRAMINE HYDROCHLORIDE 50 MG/ML
25 INJECTION, SOLUTION INTRAMUSCULAR; INTRAVENOUS
Start: 2025-09-16

## 2025-08-20 RX ORDER — ALBUTEROL SULFATE 90 UG/1
1-2 INHALANT RESPIRATORY (INHALATION)
Start: 2025-10-14

## 2025-08-20 RX ORDER — ALBUTEROL SULFATE 0.83 MG/ML
2.5 SOLUTION RESPIRATORY (INHALATION)
OUTPATIENT
Start: 2025-09-30

## 2025-08-20 RX ORDER — METHYLPREDNISOLONE SODIUM SUCCINATE 40 MG/ML
40 INJECTION INTRAMUSCULAR; INTRAVENOUS
Start: 2025-09-09

## 2025-08-20 RX ORDER — HEPARIN SODIUM,PORCINE 10 UNIT/ML
5-20 VIAL (ML) INTRAVENOUS DAILY PRN
OUTPATIENT
Start: 2025-10-14

## 2025-08-20 RX ORDER — PALONOSETRON 0.05 MG/ML
0.25 INJECTION, SOLUTION INTRAVENOUS ONCE
OUTPATIENT
Start: 2025-09-30

## 2025-08-20 RX ORDER — EPINEPHRINE 1 MG/ML
0.3 INJECTION, SOLUTION INTRAMUSCULAR; SUBCUTANEOUS EVERY 5 MIN PRN
OUTPATIENT
Start: 2025-09-23

## 2025-08-20 RX ORDER — DIPHENHYDRAMINE HYDROCHLORIDE 50 MG/ML
50 INJECTION, SOLUTION INTRAMUSCULAR; INTRAVENOUS
Start: 2025-09-30

## 2025-08-20 RX ORDER — LORAZEPAM 2 MG/ML
0.5 INJECTION INTRAMUSCULAR EVERY 4 HOURS PRN
OUTPATIENT
Start: 2025-10-07

## 2025-08-20 RX ORDER — EPINEPHRINE 1 MG/ML
0.3 INJECTION, SOLUTION INTRAMUSCULAR; SUBCUTANEOUS EVERY 5 MIN PRN
OUTPATIENT
Start: 2025-10-14

## 2025-08-20 RX ORDER — DEXTROSE, SODIUM CHLORIDE, AND POTASSIUM CHLORIDE 5; .45; .15 G/100ML; G/100ML; G/100ML
2000 INJECTION INTRAVENOUS ONCE
Start: 2025-10-07 | End: 2025-10-06

## 2025-08-20 RX ORDER — ALBUTEROL SULFATE 90 UG/1
1-2 INHALANT RESPIRATORY (INHALATION)
Start: 2025-09-16

## 2025-08-20 RX ORDER — ALBUTEROL SULFATE 0.83 MG/ML
2.5 SOLUTION RESPIRATORY (INHALATION)
OUTPATIENT
Start: 2025-09-09

## 2025-08-20 RX ORDER — MEPERIDINE HYDROCHLORIDE 25 MG/ML
25 INJECTION INTRAMUSCULAR; INTRAVENOUS; SUBCUTANEOUS
OUTPATIENT
Start: 2025-09-16

## 2025-08-20 RX ORDER — EPINEPHRINE 1 MG/ML
0.3 INJECTION, SOLUTION INTRAMUSCULAR; SUBCUTANEOUS EVERY 5 MIN PRN
OUTPATIENT
Start: 2025-10-07

## 2025-08-20 RX ORDER — METHYLPREDNISOLONE SODIUM SUCCINATE 40 MG/ML
40 INJECTION INTRAMUSCULAR; INTRAVENOUS
Start: 2025-09-23

## 2025-08-20 RX ORDER — ALBUTEROL SULFATE 0.83 MG/ML
2.5 SOLUTION RESPIRATORY (INHALATION)
OUTPATIENT
Start: 2025-09-23

## 2025-08-20 RX ORDER — DEXTROSE, SODIUM CHLORIDE, AND POTASSIUM CHLORIDE 5; .45; .15 G/100ML; G/100ML; G/100ML
2000 INJECTION INTRAVENOUS ONCE
Start: 2025-10-14 | End: 2025-10-13

## 2025-08-20 RX ORDER — DIPHENHYDRAMINE HYDROCHLORIDE 50 MG/ML
50 INJECTION, SOLUTION INTRAMUSCULAR; INTRAVENOUS
Start: 2025-09-09

## 2025-08-20 RX ORDER — MEPERIDINE HYDROCHLORIDE 25 MG/ML
25 INJECTION INTRAMUSCULAR; INTRAVENOUS; SUBCUTANEOUS
OUTPATIENT
Start: 2025-10-14

## 2025-08-20 RX ORDER — LORAZEPAM 2 MG/ML
0.5 INJECTION INTRAMUSCULAR EVERY 4 HOURS PRN
OUTPATIENT
Start: 2025-09-16

## 2025-08-20 RX ORDER — DIPHENHYDRAMINE HYDROCHLORIDE 50 MG/ML
25 INJECTION, SOLUTION INTRAMUSCULAR; INTRAVENOUS
Start: 2025-09-09

## 2025-08-20 RX ORDER — DIPHENHYDRAMINE HYDROCHLORIDE 50 MG/ML
50 INJECTION, SOLUTION INTRAMUSCULAR; INTRAVENOUS
Start: 2025-09-23

## 2025-08-20 RX ORDER — LORAZEPAM 2 MG/ML
0.5 INJECTION INTRAMUSCULAR EVERY 4 HOURS PRN
OUTPATIENT
Start: 2025-10-14

## 2025-08-20 RX ORDER — ALBUTEROL SULFATE 90 UG/1
1-2 INHALANT RESPIRATORY (INHALATION)
Start: 2025-09-23

## 2025-08-20 RX ORDER — PALONOSETRON 0.05 MG/ML
0.25 INJECTION, SOLUTION INTRAVENOUS ONCE
OUTPATIENT
Start: 2025-09-16

## 2025-08-20 RX ORDER — ALBUTEROL SULFATE 90 UG/1
1-2 INHALANT RESPIRATORY (INHALATION)
Start: 2025-10-07

## 2025-08-20 RX ORDER — DEXTROSE, SODIUM CHLORIDE, AND POTASSIUM CHLORIDE 5; .45; .15 G/100ML; G/100ML; G/100ML
2000 INJECTION INTRAVENOUS ONCE
Start: 2025-09-23 | End: 2025-09-22

## 2025-08-20 RX ADMIN — IOHEXOL 90 ML: 350 INJECTION, SOLUTION INTRAVENOUS at 09:10

## 2025-08-22 ENCOUNTER — LAB (OUTPATIENT)
Dept: LAB | Facility: CLINIC | Age: 75
End: 2025-08-22
Payer: COMMERCIAL

## 2025-08-22 DIAGNOSIS — E87.6 HYPOKALEMIA: ICD-10-CM

## 2025-08-22 PROCEDURE — 80048 BASIC METABOLIC PNL TOTAL CA: CPT

## 2025-08-22 PROCEDURE — 36415 COLL VENOUS BLD VENIPUNCTURE: CPT

## 2025-08-23 LAB
ANION GAP SERPL CALCULATED.3IONS-SCNC: 14 MMOL/L (ref 7–15)
BUN SERPL-MCNC: 19.7 MG/DL (ref 8–23)
CALCIUM SERPL-MCNC: 9.6 MG/DL (ref 8.8–10.4)
CHLORIDE SERPL-SCNC: 98 MMOL/L (ref 98–107)
CREAT SERPL-MCNC: 0.83 MG/DL (ref 0.67–1.17)
EGFRCR SERPLBLD CKD-EPI 2021: >90 ML/MIN/1.73M2
GLUCOSE SERPL-MCNC: 242 MG/DL (ref 70–99)
HCO3 SERPL-SCNC: 23 MMOL/L (ref 22–29)
POTASSIUM SERPL-SCNC: 3.7 MMOL/L (ref 3.4–5.3)
SODIUM SERPL-SCNC: 135 MMOL/L (ref 135–145)

## 2025-08-25 ENCOUNTER — TRANSFERRED RECORDS (OUTPATIENT)
Dept: HEALTH INFORMATION MANAGEMENT | Facility: CLINIC | Age: 75
End: 2025-08-25
Payer: COMMERCIAL

## 2025-08-27 ENCOUNTER — ANCILLARY PROCEDURE (OUTPATIENT)
Dept: GENERAL RADIOLOGY | Facility: CLINIC | Age: 75
End: 2025-08-27
Attending: OTOLARYNGOLOGY
Payer: COMMERCIAL

## 2025-08-27 ENCOUNTER — ANCILLARY PROCEDURE (OUTPATIENT)
Dept: GENERAL RADIOLOGY | Facility: CLINIC | Age: 75
End: 2025-08-27
Attending: INTERNAL MEDICINE
Payer: COMMERCIAL

## 2025-08-27 ENCOUNTER — THERAPY VISIT (OUTPATIENT)
Dept: SPEECH THERAPY | Facility: CLINIC | Age: 75
End: 2025-08-27
Payer: COMMERCIAL

## 2025-08-27 DIAGNOSIS — D49.0 NEOPLASM OF BASE OF TONGUE: Primary | ICD-10-CM

## 2025-08-27 DIAGNOSIS — C01 SQUAMOUS CELL CARCINOMA OF BASE OF TONGUE (H): ICD-10-CM

## 2025-08-27 DIAGNOSIS — R13.12 OROPHARYNGEAL DYSPHAGIA: ICD-10-CM

## 2025-08-27 PROCEDURE — 74230 X-RAY XM SWLNG FUNCJ C+: CPT | Mod: GC | Performed by: STUDENT IN AN ORGANIZED HEALTH CARE EDUCATION/TRAINING PROGRAM

## 2025-08-27 RX ORDER — BARIUM SULFATE 400 MG/ML
SUSPENSION ORAL ONCE
Status: COMPLETED | OUTPATIENT
Start: 2025-08-27 | End: 2025-08-27

## 2025-08-27 RX ADMIN — BARIUM SULFATE: 400 SUSPENSION ORAL at 15:47

## 2025-08-28 DIAGNOSIS — G89.3 CANCER RELATED PAIN: ICD-10-CM

## 2025-08-28 DIAGNOSIS — C01 SQUAMOUS CELL CARCINOMA OF BASE OF TONGUE (H): Primary | ICD-10-CM

## 2025-08-28 RX ORDER — LIDOCAINE HYDROCHLORIDE 20 MG/ML
15 SOLUTION OROPHARYNGEAL
Qty: 100 ML | Refills: 5 | Status: SHIPPED | OUTPATIENT
Start: 2025-08-28 | End: 2025-08-28

## 2025-08-28 RX ORDER — LIDOCAINE HYDROCHLORIDE 20 MG/ML
5-10 SOLUTION OROPHARYNGEAL
Qty: 200 ML | Refills: 5 | Status: SHIPPED | OUTPATIENT
Start: 2025-08-28

## 2025-08-28 RX ORDER — OXYCODONE HYDROCHLORIDE 5 MG/1
5-10 TABLET ORAL EVERY 6 HOURS PRN
Qty: 40 TABLET | Refills: 0 | Status: SHIPPED | OUTPATIENT
Start: 2025-08-28

## 2025-08-28 RX ORDER — DIPHENHYDRAMINE HYDROCHLORIDE AND LIDOCAINE HYDROCHLORIDE AND ALUMINUM HYDROXIDE AND MAGNESIUM HYDRO
5-10 KIT EVERY 6 HOURS PRN
Qty: 237 ML | Refills: 5 | Status: CANCELLED | OUTPATIENT
Start: 2025-08-28

## 2025-08-29 ENCOUNTER — TELEPHONE (OUTPATIENT)
Dept: CARDIOLOGY | Facility: CLINIC | Age: 75
End: 2025-08-29
Payer: COMMERCIAL

## 2025-09-03 ENCOUNTER — MYC MEDICAL ADVICE (OUTPATIENT)
Dept: INTERVENTIONAL RADIOLOGY/VASCULAR | Facility: CLINIC | Age: 75
End: 2025-09-03
Payer: COMMERCIAL

## (undated) DEVICE — PREP DYNA-HEX 4% CHG SCRUB 4OZ BOTTLE MDS098710

## (undated) DEVICE — GLOVE BIOGEL PI ULTRATOUCH G SZ 7.5 42175

## (undated) DEVICE — TUBING IRRIG TUR Y TYPE 96" LF 6543-01

## (undated) DEVICE — GLOVE BIOGEL PI INDICATOR 8.0 LF 41680

## (undated) DEVICE — SOL WATER IRRIG 1000ML BOTTLE 2F7114

## (undated) DEVICE — JUMPSUIT CYSTO DISP SD-100

## (undated) DEVICE — TUBING SUCTION MEDI-VAC 1/4"X20' N620A - HE

## (undated) DEVICE — TUBING SET THERMEDX UROLOGY SGL USE LL0006

## (undated) DEVICE — Device

## (undated) DEVICE — GLOVE BIOGEL PI SZ 8.0 40880

## (undated) DEVICE — SOL WATER IRRIG 3000ML BAG 2B7117

## (undated) DEVICE — MAT FLOOR SURGICAL 40X38 0702140238

## (undated) DEVICE — TUBING SUCTION MEDI-VAC 1/4"X20' N620A

## (undated) DEVICE — GOWN LG DISP 9515

## (undated) DEVICE — STRAP CATH LEG ADJUSTABLE 0814-8200

## (undated) DEVICE — CUSTOM PACK CYSTO PREFERRED SOT5BCYHEA

## (undated) DEVICE — GOWN IMPERVIOUS BREATHABLE SMART XLG 89045

## (undated) RX ORDER — EPHEDRINE SULFATE 50 MG/ML
INJECTION, SOLUTION INTRAMUSCULAR; INTRAVENOUS; SUBCUTANEOUS
Status: DISPENSED
Start: 2024-07-23

## (undated) RX ORDER — FENTANYL CITRATE 50 UG/ML
INJECTION, SOLUTION INTRAMUSCULAR; INTRAVENOUS
Status: DISPENSED
Start: 2024-07-23

## (undated) RX ORDER — DEXAMETHASONE SODIUM PHOSPHATE 10 MG/ML
INJECTION, SOLUTION INTRAMUSCULAR; INTRAVENOUS
Status: DISPENSED
Start: 2023-01-17

## (undated) RX ORDER — PROPOFOL 10 MG/ML
INJECTION, EMULSION INTRAVENOUS
Status: DISPENSED
Start: 2024-07-23

## (undated) RX ORDER — LIDOCAINE HYDROCHLORIDE 10 MG/ML
INJECTION, SOLUTION EPIDURAL; INFILTRATION; INTRACAUDAL; PERINEURAL
Status: DISPENSED
Start: 2024-07-23

## (undated) RX ORDER — PROPOFOL 10 MG/ML
INJECTION, EMULSION INTRAVENOUS
Status: DISPENSED
Start: 2023-01-17

## (undated) RX ORDER — ONDANSETRON 2 MG/ML
INJECTION INTRAMUSCULAR; INTRAVENOUS
Status: DISPENSED
Start: 2023-01-17

## (undated) RX ORDER — FENTANYL CITRATE 50 UG/ML
INJECTION, SOLUTION INTRAMUSCULAR; INTRAVENOUS
Status: DISPENSED
Start: 2023-01-17

## (undated) RX ORDER — FENTANYL CITRATE-0.9 % NACL/PF 10 MCG/ML
PLASTIC BAG, INJECTION (ML) INTRAVENOUS
Status: DISPENSED
Start: 2023-01-17